# Patient Record
Sex: FEMALE | Race: OTHER | HISPANIC OR LATINO | Employment: OTHER | URBAN - METROPOLITAN AREA
[De-identification: names, ages, dates, MRNs, and addresses within clinical notes are randomized per-mention and may not be internally consistent; named-entity substitution may affect disease eponyms.]

---

## 2019-09-19 ENCOUNTER — HOSPITAL ENCOUNTER (OUTPATIENT)
Dept: OTHER | Facility: HOSPITAL | Age: 74
Discharge: HOME | End: 2019-09-19
Attending: INTERNAL MEDICINE

## 2019-09-20 LAB — HCV AB SER DONR QL: <0.1 S/CO RATIO (ref 0–0.9)

## 2019-09-30 ENCOUNTER — HOSPITAL ENCOUNTER (OUTPATIENT)
Dept: GENERAL RADIOLOGY | Facility: HOSPITAL | Age: 74
Discharge: HOME | End: 2019-09-30
Attending: NURSE PRACTITIONER

## 2019-11-04 ENCOUNTER — ANESTHESIA (OUTPATIENT)
Dept: PERIOP | Facility: HOSPITAL | Age: 74
End: 2019-11-04

## 2019-11-04 ENCOUNTER — ANESTHESIA EVENT (OUTPATIENT)
Dept: PERIOP | Facility: HOSPITAL | Age: 74
End: 2019-11-04

## 2019-11-04 ENCOUNTER — HOSPITAL ENCOUNTER (OUTPATIENT)
Facility: HOSPITAL | Age: 74
Setting detail: HOSPITAL OUTPATIENT SURGERY
Discharge: HOME | End: 2019-11-04
Attending: SURGERY | Admitting: SURGERY

## 2019-11-04 ENCOUNTER — APPOINTMENT (OUTPATIENT)
Dept: GENERAL RADIOLOGY | Facility: HOSPITAL | Age: 74
End: 2019-11-04

## 2019-11-04 VITALS
TEMPERATURE: 97.6 F | WEIGHT: 102 LBS | DIASTOLIC BLOOD PRESSURE: 73 MMHG | OXYGEN SATURATION: 98 % | BODY MASS INDEX: 17.42 KG/M2 | HEART RATE: 93 BPM | SYSTOLIC BLOOD PRESSURE: 179 MMHG | RESPIRATION RATE: 18 BRPM | HEIGHT: 64 IN

## 2019-11-04 LAB
ABO GROUP BLD: NORMAL
ANION GAP SERPL CALCULATED.3IONS-SCNC: 13.9 MMOL/L (ref 5–15)
BLD GP AB SCN SERPL QL: NEGATIVE
BUN BLD-MCNC: 13 MG/DL (ref 8–23)
BUN/CREAT SERPL: 17.3 (ref 7–25)
CALCIUM SPEC-SCNC: 10 MG/DL (ref 8.6–10.5)
CHLORIDE SERPL-SCNC: 99 MMOL/L (ref 98–107)
CO2 SERPL-SCNC: 28.1 MMOL/L (ref 22–29)
CREAT BLD-MCNC: 0.75 MG/DL (ref 0.57–1)
DEPRECATED RDW RBC AUTO: 44.5 FL (ref 37–54)
ERYTHROCYTE [DISTWIDTH] IN BLOOD BY AUTOMATED COUNT: 13.2 % (ref 12.3–15.4)
GFR SERPL CREATININE-BSD FRML MDRD: 76 ML/MIN/1.73
GLUCOSE BLD-MCNC: 130 MG/DL (ref 65–99)
GLUCOSE BLDC GLUCOMTR-MCNC: 96 MG/DL (ref 70–130)
HCT VFR BLD AUTO: 40.7 % (ref 34–46.6)
HGB BLD-MCNC: 13.2 G/DL (ref 12–15.9)
INR PPP: 1.01 (ref 0.9–1.1)
MCH RBC QN AUTO: 29.7 PG (ref 26.6–33)
MCHC RBC AUTO-ENTMCNC: 32.4 G/DL (ref 31.5–35.7)
MCV RBC AUTO: 91.5 FL (ref 79–97)
PLATELET # BLD AUTO: 314 10*3/MM3 (ref 140–450)
PMV BLD AUTO: 9.9 FL (ref 6–12)
POTASSIUM BLD-SCNC: 3.7 MMOL/L (ref 3.5–5.2)
PROTHROMBIN TIME: 13.1 SECONDS (ref 11.7–14.2)
RBC # BLD AUTO: 4.45 10*6/MM3 (ref 3.77–5.28)
RH BLD: NEGATIVE
SODIUM BLD-SCNC: 141 MMOL/L (ref 136–145)
T&S EXPIRATION DATE: NORMAL
WBC NRBC COR # BLD: 10.46 10*3/MM3 (ref 3.4–10.8)

## 2019-11-04 PROCEDURE — 25010000002 FENTANYL CITRATE (PF) 100 MCG/2ML SOLUTION: Performed by: NURSE ANESTHETIST, CERTIFIED REGISTERED

## 2019-11-04 PROCEDURE — C1725 CATH, TRANSLUMIN NON-LASER: HCPCS | Performed by: SURGERY

## 2019-11-04 PROCEDURE — C1769 GUIDE WIRE: HCPCS | Performed by: SURGERY

## 2019-11-04 PROCEDURE — 86901 BLOOD TYPING SEROLOGIC RH(D): CPT | Performed by: SURGERY

## 2019-11-04 PROCEDURE — 86850 RBC ANTIBODY SCREEN: CPT | Performed by: SURGERY

## 2019-11-04 PROCEDURE — C1874 STENT, COATED/COV W/DEL SYS: HCPCS | Performed by: SURGERY

## 2019-11-04 PROCEDURE — C1760 CLOSURE DEV, VASC: HCPCS | Performed by: SURGERY

## 2019-11-04 PROCEDURE — 25010000002 HEPARIN (PORCINE) PER 1000 UNITS: Performed by: NURSE ANESTHETIST, CERTIFIED REGISTERED

## 2019-11-04 PROCEDURE — 75625 CONTRAST EXAM ABDOMINL AORTA: CPT

## 2019-11-04 PROCEDURE — 93005 ELECTROCARDIOGRAM TRACING: CPT | Performed by: SURGERY

## 2019-11-04 PROCEDURE — 86900 BLOOD TYPING SEROLOGIC ABO: CPT | Performed by: SURGERY

## 2019-11-04 PROCEDURE — 75716 ARTERY X-RAYS ARMS/LEGS: CPT

## 2019-11-04 PROCEDURE — 25010000002 MIDAZOLAM PER 1 MG: Performed by: NURSE ANESTHETIST, CERTIFIED REGISTERED

## 2019-11-04 PROCEDURE — 80048 BASIC METABOLIC PNL TOTAL CA: CPT | Performed by: SURGERY

## 2019-11-04 PROCEDURE — C1887 CATHETER, GUIDING: HCPCS | Performed by: SURGERY

## 2019-11-04 PROCEDURE — 85610 PROTHROMBIN TIME: CPT | Performed by: SURGERY

## 2019-11-04 PROCEDURE — 0 IOPAMIDOL PER 1 ML: Performed by: SURGERY

## 2019-11-04 PROCEDURE — C1894 INTRO/SHEATH, NON-LASER: HCPCS | Performed by: SURGERY

## 2019-11-04 PROCEDURE — 25010000003 LIDOCAINE 1 % SOLUTION 20 ML VIAL: Performed by: SURGERY

## 2019-11-04 PROCEDURE — 25010000002 HEPARIN (PORCINE) PER 1000 UNITS: Performed by: SURGERY

## 2019-11-04 PROCEDURE — 25010000003 CEFAZOLIN IN DEXTROSE 2-4 GM/100ML-% SOLUTION: Performed by: SURGERY

## 2019-11-04 PROCEDURE — 85027 COMPLETE CBC AUTOMATED: CPT | Performed by: SURGERY

## 2019-11-04 PROCEDURE — 25010000002 PROPOFOL 10 MG/ML EMULSION: Performed by: NURSE ANESTHETIST, CERTIFIED REGISTERED

## 2019-11-04 PROCEDURE — 25010000002 MIDAZOLAM PER 1 MG: Performed by: ANESTHESIOLOGY

## 2019-11-04 PROCEDURE — 93010 ELECTROCARDIOGRAM REPORT: CPT | Performed by: INTERNAL MEDICINE

## 2019-11-04 PROCEDURE — 82962 GLUCOSE BLOOD TEST: CPT

## 2019-11-04 DEVICE — STNT PERIPH ZILVER PTX 6F 6X40MM 125CM BX/1: Type: IMPLANTABLE DEVICE | Site: ARTERY ILIAC | Status: FUNCTIONAL

## 2019-11-04 RX ORDER — MIDAZOLAM HYDROCHLORIDE 1 MG/ML
1 INJECTION INTRAMUSCULAR; INTRAVENOUS
Status: DISCONTINUED | OUTPATIENT
Start: 2019-11-04 | End: 2019-11-04 | Stop reason: HOSPADM

## 2019-11-04 RX ORDER — SODIUM CHLORIDE 0.9 % (FLUSH) 0.9 %
3-10 SYRINGE (ML) INJECTION AS NEEDED
Status: DISCONTINUED | OUTPATIENT
Start: 2019-11-04 | End: 2019-11-04 | Stop reason: HOSPADM

## 2019-11-04 RX ORDER — FAMOTIDINE 20 MG/1
20 TABLET, FILM COATED ORAL 2 TIMES DAILY
Status: ON HOLD | COMMUNITY
End: 2020-11-24

## 2019-11-04 RX ORDER — PROMETHAZINE HYDROCHLORIDE 25 MG/ML
12.5 INJECTION, SOLUTION INTRAMUSCULAR; INTRAVENOUS ONCE AS NEEDED
Status: DISCONTINUED | OUTPATIENT
Start: 2019-11-04 | End: 2019-11-04 | Stop reason: HOSPADM

## 2019-11-04 RX ORDER — SODIUM CHLORIDE, SODIUM LACTATE, POTASSIUM CHLORIDE, CALCIUM CHLORIDE 600; 310; 30; 20 MG/100ML; MG/100ML; MG/100ML; MG/100ML
9 INJECTION, SOLUTION INTRAVENOUS CONTINUOUS
Status: DISCONTINUED | OUTPATIENT
Start: 2019-11-04 | End: 2019-11-04 | Stop reason: HOSPADM

## 2019-11-04 RX ORDER — ACETAMINOPHEN 325 MG/1
650 TABLET ORAL ONCE AS NEEDED
Status: DISCONTINUED | OUTPATIENT
Start: 2019-11-04 | End: 2019-11-04 | Stop reason: HOSPADM

## 2019-11-04 RX ORDER — LIDOCAINE HYDROCHLORIDE 10 MG/ML
0.5 INJECTION, SOLUTION EPIDURAL; INFILTRATION; INTRACAUDAL; PERINEURAL ONCE AS NEEDED
Status: DISCONTINUED | OUTPATIENT
Start: 2019-11-04 | End: 2019-11-04 | Stop reason: HOSPADM

## 2019-11-04 RX ORDER — ONDANSETRON 2 MG/ML
4 INJECTION INTRAMUSCULAR; INTRAVENOUS ONCE AS NEEDED
Status: DISCONTINUED | OUTPATIENT
Start: 2019-11-04 | End: 2019-11-04 | Stop reason: HOSPADM

## 2019-11-04 RX ORDER — SODIUM CHLORIDE 0.9 % (FLUSH) 0.9 %
3 SYRINGE (ML) INJECTION EVERY 12 HOURS SCHEDULED
Status: DISCONTINUED | OUTPATIENT
Start: 2019-11-04 | End: 2019-11-04 | Stop reason: HOSPADM

## 2019-11-04 RX ORDER — PROPOFOL 10 MG/ML
VIAL (ML) INTRAVENOUS CONTINUOUS PRN
Status: DISCONTINUED | OUTPATIENT
Start: 2019-11-04 | End: 2019-11-04 | Stop reason: SURG

## 2019-11-04 RX ORDER — CLOPIDOGREL BISULFATE 75 MG/1
150 TABLET ORAL DAILY
Status: DISCONTINUED | OUTPATIENT
Start: 2019-11-04 | End: 2019-11-04 | Stop reason: HOSPADM

## 2019-11-04 RX ORDER — LEVOTHYROXINE SODIUM 0.05 MG/1
50 TABLET ORAL DAILY
COMMUNITY
End: 2020-12-02 | Stop reason: HOSPADM

## 2019-11-04 RX ORDER — FAMOTIDINE 10 MG/ML
20 INJECTION, SOLUTION INTRAVENOUS ONCE
Status: COMPLETED | OUTPATIENT
Start: 2019-11-04 | End: 2019-11-04

## 2019-11-04 RX ORDER — DIPHENHYDRAMINE HYDROCHLORIDE 50 MG/ML
12.5 INJECTION INTRAMUSCULAR; INTRAVENOUS
Status: DISCONTINUED | OUTPATIENT
Start: 2019-11-04 | End: 2019-11-04 | Stop reason: HOSPADM

## 2019-11-04 RX ORDER — BUSPIRONE HYDROCHLORIDE 10 MG/1
10 TABLET ORAL DAILY
Status: ON HOLD | COMMUNITY
End: 2020-11-24

## 2019-11-04 RX ORDER — FENTANYL CITRATE 50 UG/ML
INJECTION, SOLUTION INTRAMUSCULAR; INTRAVENOUS AS NEEDED
Status: DISCONTINUED | OUTPATIENT
Start: 2019-11-04 | End: 2019-11-04 | Stop reason: SURG

## 2019-11-04 RX ORDER — PROMETHAZINE HYDROCHLORIDE 25 MG/1
25 TABLET ORAL ONCE AS NEEDED
Status: DISCONTINUED | OUTPATIENT
Start: 2019-11-04 | End: 2019-11-04 | Stop reason: HOSPADM

## 2019-11-04 RX ORDER — FENTANYL CITRATE 50 UG/ML
50 INJECTION, SOLUTION INTRAMUSCULAR; INTRAVENOUS
Status: DISCONTINUED | OUTPATIENT
Start: 2019-11-04 | End: 2019-11-04 | Stop reason: HOSPADM

## 2019-11-04 RX ORDER — PENTOXIFYLLINE 400 MG/1
400 TABLET, EXTENDED RELEASE ORAL
COMMUNITY
End: 2019-11-04 | Stop reason: HOSPADM

## 2019-11-04 RX ORDER — MIDAZOLAM HYDROCHLORIDE 1 MG/ML
INJECTION INTRAMUSCULAR; INTRAVENOUS AS NEEDED
Status: DISCONTINUED | OUTPATIENT
Start: 2019-11-04 | End: 2019-11-04 | Stop reason: SURG

## 2019-11-04 RX ORDER — EPHEDRINE SULFATE 50 MG/ML
5 INJECTION, SOLUTION INTRAVENOUS ONCE AS NEEDED
Status: DISCONTINUED | OUTPATIENT
Start: 2019-11-04 | End: 2019-11-04 | Stop reason: HOSPADM

## 2019-11-04 RX ORDER — SODIUM CHLORIDE, SODIUM LACTATE, POTASSIUM CHLORIDE, CALCIUM CHLORIDE 600; 310; 30; 20 MG/100ML; MG/100ML; MG/100ML; MG/100ML
100 INJECTION, SOLUTION INTRAVENOUS CONTINUOUS
Status: DISCONTINUED | OUTPATIENT
Start: 2019-11-04 | End: 2019-11-04 | Stop reason: HOSPADM

## 2019-11-04 RX ORDER — ATORVASTATIN CALCIUM 10 MG/1
10 TABLET, FILM COATED ORAL DAILY
Status: ON HOLD | COMMUNITY
End: 2020-11-24

## 2019-11-04 RX ORDER — CEFAZOLIN SODIUM 2 G/100ML
2 INJECTION, SOLUTION INTRAVENOUS ONCE
Status: COMPLETED | OUTPATIENT
Start: 2019-11-04 | End: 2019-11-04

## 2019-11-04 RX ORDER — ALBUTEROL SULFATE 2.5 MG/3ML
2.5 SOLUTION RESPIRATORY (INHALATION) ONCE AS NEEDED
Status: DISCONTINUED | OUTPATIENT
Start: 2019-11-04 | End: 2019-11-04 | Stop reason: HOSPADM

## 2019-11-04 RX ORDER — CILOSTAZOL 50 MG/1
50 TABLET ORAL
Status: COMPLETED | OUTPATIENT
Start: 2019-11-04 | End: 2019-11-04

## 2019-11-04 RX ORDER — DIPHENHYDRAMINE HCL 25 MG
25 CAPSULE ORAL
Status: DISCONTINUED | OUTPATIENT
Start: 2019-11-04 | End: 2019-11-04 | Stop reason: HOSPADM

## 2019-11-04 RX ORDER — HYDRALAZINE HYDROCHLORIDE 20 MG/ML
5 INJECTION INTRAMUSCULAR; INTRAVENOUS
Status: DISCONTINUED | OUTPATIENT
Start: 2019-11-04 | End: 2019-11-04 | Stop reason: HOSPADM

## 2019-11-04 RX ORDER — LOSARTAN POTASSIUM AND HYDROCHLOROTHIAZIDE 12.5; 5 MG/1; MG/1
1 TABLET ORAL DAILY
Status: ON HOLD | COMMUNITY
End: 2020-11-24

## 2019-11-04 RX ORDER — HYDROCODONE BITARTRATE AND ACETAMINOPHEN 7.5; 325 MG/1; MG/1
1 TABLET ORAL ONCE AS NEEDED
Status: DISCONTINUED | OUTPATIENT
Start: 2019-11-04 | End: 2019-11-04 | Stop reason: HOSPADM

## 2019-11-04 RX ORDER — FLUMAZENIL 0.1 MG/ML
0.2 INJECTION INTRAVENOUS AS NEEDED
Status: DISCONTINUED | OUTPATIENT
Start: 2019-11-04 | End: 2019-11-04 | Stop reason: HOSPADM

## 2019-11-04 RX ORDER — HEPARIN SODIUM 1000 [USP'U]/ML
INJECTION, SOLUTION INTRAVENOUS; SUBCUTANEOUS AS NEEDED
Status: DISCONTINUED | OUTPATIENT
Start: 2019-11-04 | End: 2019-11-04 | Stop reason: SURG

## 2019-11-04 RX ORDER — CLOPIDOGREL BISULFATE 75 MG/1
75 TABLET ORAL DAILY
COMMUNITY
End: 2020-12-02 | Stop reason: HOSPADM

## 2019-11-04 RX ORDER — CLOPIDOGREL BISULFATE 75 MG/1
75 TABLET ORAL DAILY
Status: DISCONTINUED | OUTPATIENT
Start: 2019-11-04 | End: 2019-11-04

## 2019-11-04 RX ORDER — PROMETHAZINE HYDROCHLORIDE 25 MG/1
25 SUPPOSITORY RECTAL ONCE AS NEEDED
Status: DISCONTINUED | OUTPATIENT
Start: 2019-11-04 | End: 2019-11-04 | Stop reason: HOSPADM

## 2019-11-04 RX ORDER — OXYCODONE AND ACETAMINOPHEN 7.5; 325 MG/1; MG/1
1 TABLET ORAL ONCE AS NEEDED
Status: DISCONTINUED | OUTPATIENT
Start: 2019-11-04 | End: 2019-11-04 | Stop reason: HOSPADM

## 2019-11-04 RX ORDER — HYDROMORPHONE HYDROCHLORIDE 1 MG/ML
0.2 INJECTION, SOLUTION INTRAMUSCULAR; INTRAVENOUS; SUBCUTANEOUS
Status: DISCONTINUED | OUTPATIENT
Start: 2019-11-04 | End: 2019-11-04 | Stop reason: HOSPADM

## 2019-11-04 RX ORDER — MIDAZOLAM HYDROCHLORIDE 1 MG/ML
2 INJECTION INTRAMUSCULAR; INTRAVENOUS
Status: DISCONTINUED | OUTPATIENT
Start: 2019-11-04 | End: 2019-11-04 | Stop reason: HOSPADM

## 2019-11-04 RX ORDER — PROMETHAZINE HYDROCHLORIDE 25 MG/ML
6.25 INJECTION, SOLUTION INTRAMUSCULAR; INTRAVENOUS
Status: DISCONTINUED | OUTPATIENT
Start: 2019-11-04 | End: 2019-11-04 | Stop reason: HOSPADM

## 2019-11-04 RX ORDER — NALOXONE HCL 0.4 MG/ML
0.2 VIAL (ML) INJECTION AS NEEDED
Status: DISCONTINUED | OUTPATIENT
Start: 2019-11-04 | End: 2019-11-04 | Stop reason: HOSPADM

## 2019-11-04 RX ADMIN — FENTANYL CITRATE 25 MCG: 50 INJECTION, SOLUTION INTRAMUSCULAR; INTRAVENOUS at 17:27

## 2019-11-04 RX ADMIN — FAMOTIDINE 20 MG: 10 INJECTION INTRAVENOUS at 12:33

## 2019-11-04 RX ADMIN — PROPOFOL 75 MCG/KG/MIN: 10 INJECTION, EMULSION INTRAVENOUS at 14:45

## 2019-11-04 RX ADMIN — CILOSTAZOL 50 MG: 50 TABLET ORAL at 17:10

## 2019-11-04 RX ADMIN — FENTANYL CITRATE 25 MCG: 50 INJECTION INTRAMUSCULAR; INTRAVENOUS at 14:45

## 2019-11-04 RX ADMIN — FENTANYL CITRATE 25 MCG: 50 INJECTION INTRAMUSCULAR; INTRAVENOUS at 15:05

## 2019-11-04 RX ADMIN — CLOPIDOGREL 150 MG: 75 TABLET, FILM COATED ORAL at 17:11

## 2019-11-04 RX ADMIN — CEFAZOLIN SODIUM 2 G: 2 INJECTION, SOLUTION INTRAVENOUS at 14:36

## 2019-11-04 RX ADMIN — Medication 0.5 MG: at 14:45

## 2019-11-04 RX ADMIN — HEPARIN SODIUM 5000 UNITS: 1000 INJECTION, SOLUTION INTRAVENOUS; SUBCUTANEOUS at 15:05

## 2019-11-04 RX ADMIN — IOPAMIDOL 60 ML: 510 INJECTION, SOLUTION INTRAVASCULAR at 14:29

## 2019-11-04 RX ADMIN — SODIUM CHLORIDE, POTASSIUM CHLORIDE, SODIUM LACTATE AND CALCIUM CHLORIDE 9 ML/HR: 600; 310; 30; 20 INJECTION, SOLUTION INTRAVENOUS at 12:32

## 2019-11-04 RX ADMIN — Medication 0.5 MG: at 15:05

## 2019-11-04 RX ADMIN — MIDAZOLAM 1 MG: 1 INJECTION INTRAMUSCULAR; INTRAVENOUS at 12:33

## 2019-11-04 RX ADMIN — FENTANYL CITRATE 50 MCG: 50 INJECTION, SOLUTION INTRAMUSCULAR; INTRAVENOUS at 18:17

## 2019-11-04 RX ADMIN — FENTANYL CITRATE 25 MCG: 50 INJECTION, SOLUTION INTRAMUSCULAR; INTRAVENOUS at 16:19

## 2019-11-04 NOTE — ANESTHESIA PREPROCEDURE EVALUATION
Anesthesia Evaluation     Patient summary reviewed and Nursing notes reviewed                Airway   Mallampati: II  Dental      Pulmonary - negative pulmonary ROS   Cardiovascular     ECG reviewed  Rhythm: regular  Rate: normal    (+) hypertension, PVD, hyperlipidemia,       Neuro/Psych- negative ROS  GI/Hepatic/Renal/Endo    (+)  GERD,  diabetes mellitus,     Musculoskeletal (-) negative ROS    Abdominal    Substance History - negative use     OB/GYN negative ob/gyn ROS         Other                        Anesthesia Plan    ASA 3     MAC     intravenous induction   Anesthetic plan, all risks, benefits, and alternatives have been provided, discussed and informed consent has been obtained with: patient.

## 2019-11-04 NOTE — OP NOTE
Operative Note  Date of Admission:  11/4/2019  OR Date: 11/4/2019    Pre-op Diagnosis:   Peripheral artery occlusive disease with rest pain and severe claudication left lower extremity    Post-op Diagnosis:     Same secondary to high-grade left common iliac artery stenosis and total occlusion of left distal superficial femoral artery and proximal popliteal artery    Procedure:   1) duplex ultrasound-guided percutaneous access of the right common femoral artery with abdominal aortogram; selective left lower extremity angiogram; angioplasty of left superficial femoral and popliteal arteries with 3 mm x 120 mm angioplasty balloon; angioplasty and stent of left common iliac artery with 6 mm x 40 mm PTX stent dilated to 7 mm size    Surgeon: Tanisha Romero Jr, MD    Assistant: JASON Hanley CSA    Anesthesia: Monitored Anesthesia Care    Staff:   Circulator: Stephanie Aguilar RN  Scrub Person: Omayra Stark  Assistant: Norma Baird CSA  Vascular Radiology Technician: Joss Troy    Estimated Blood Loss: Minimal    Specimens: None    Order Name Source Comment Collection Info Order Time   CBC (NO DIFF)   Collected By: Griselda Cruz RN 11/4/2019 11:25 AM   BASIC METABOLIC PANEL   Collected By: Griselda Cruz RN 11/4/2019 11:25 AM   PROTIME-INR   Collected By: Griselda Cruz RN 11/4/2019 11:28 AM   TYPE AND SCREEN   Collected By: Griselda Cruz RN 11/4/2019 12:06 PM       Complications: None    Findings: 80% stenosis of the proximal left common iliac artery and total occlusion of the distal superficial femoral and proximal popliteal artery.  There was primarily one-vessel runoff via the anterior tibial artery into the foot.  The peroneal and posterior tibial arteries occluded at mid calf but reconstituted distally.    Indications:  As in preop diagnosis           Procedure: The patient was placed on the operating table in the supine position.  After satisfactory level of intravenous sedation was  achieved the patient was prepped from the umbilicus to the knees using ChloraPrep and draped in the usual sterile fashion.  Using duplex ultrasound the right common iliac artery was identified and local anesthetic infiltrated into the skin and subcutaneous tissue.  The artery was accessed percutaneously under direct vision.  Guidewire was advanced in the 5 Bermudian sheath placed over the guidewire.  A pigtail catheter was placed over the guidewire.  An abdominal aortogram was performed using 15 cc of contrast at 10 cc/s.  This demonstrated normal renal arteries bilaterally and a normal infrarenal abdominal aorta.  Paired lumbar arteries were identified.  The right common iliac hypogastric and external iliac arteries were normal.  There was an 80% stenosis in the proximal left common iliac artery.  The distal common iliac artery, hypogastric, and external iliac arteries were normal.  5000 units of heparin was then given intravenously.  A rim catheter was placed over the guidewire and the guidewire was advanced down into the superficial femoral artery.  A 5 Bermudian by 55 cm long sheath was placed over the guidewire and positioned into the left common femoral artery.  A left lower femur angiogram was performed using 7 cc of contrast at 5 cc/s.  This demonstrated multiple segmental stenoses in the superficial femoral artery of approximately 50 to 70%.  There was total occlusion of the distal superficial femoral artery and proximal popliteal artery.  There is reconstitution of popliteal artery above the knee.  There was one vessel runoff primarily via the anterior tibial artery into the foot.  The peroneal and posterior tibial arteries were patent to the mid calf and then occluded.  A crossing catheter was then placed over the guidewire and I was able to traverse the total occlusion of the superficial femoral and popliteal arteries.  A hand-held injection through the crossing catheter confirmed this was intraluminal.  This  guidewire was exchanged for 0.014 guidewire.  A 3 mm x 120 mm angioplasty balloon was then placed over the guidewire and positioned in the popliteal artery distally.  This was inflated to 12 ross for 3 minutes.  The balloon was deflated and pulled proximally and the remaining superficial femoral artery was dilated up to 12 ross for 3 minutes.  An injection through the sheath demonstrated complete resolution of all stenoses brisk flow into the popliteal artery.  The below-knee segments were then utilized with segmental injections of 7 cc of contrast at 5 cc/s.  The above findings were noted.  This guidewire was exchanged for 0.035 guidewire.  The 5 Sammarinese sheath was then exchanged for a 6 x 45 cm sheath.  Sheath was then pulled back to the aortic bifurcation.  An injection through the sheath of 10 cc of contrast demonstrated the high-grade left common iliac artery stenosis.  A 6 mm x 40 mm Zilver PTX stent was placed over the guidewire and landed at the area of stenosis.  It was postdilated with a 7 mm x 40 mm balloon.  Injection through the sheath demonstrated complete resolution of the stenosis with a normal-appearing artery.  The sheath was removed and a Star close device was then placed over the guidewire and the guidewire removed.  The Star close device was deployed.  There is immediate hemostasis.  Additional manual pressure was held for 10 minutes.  Hemostasis was checked and noted be satisfactory.  Sterile dressings were applied.  The patient had a markedly improved Doppler signal in the dorsalis pedis artery and a strong posterior tibial signal as well.  The foot was hyperemic and warm.  Patient was then transported to recovery room in satisfactory condition.        Radiographic Findings:  1) as described above      There are no hospital problems to display for this patient.     Tanisha Romero Jr., MD     Date: 11/4/2019  Time: 3:55 PM

## 2019-11-04 NOTE — DISCHARGE INSTRUCTIONS
[facility]  Nick Swanson, Kady Dinh Rachel, [place], [place]  [address]  [place] [address]  [phone]      Discharge Instructions for Angiogram    1. Go home, rest and take it easy today.      2. You may experience some dizziness or memory loss from the anesthesia.  This may last for the next 24 hours.  Someone should plan on staying with you for the first 24 hours for your safety.    3. Do not make any important legal decisions or sign any legal papers for the next 24 hours.      4. Eat and drink lightly today.  Start off with liquids, jello, soup, crackers or other bland foods at first. You may advance your diet tomorrow as tolerated as long as you do not experience any nausea or vomiting.    5. You may resume routine medications including blood thinners. However, Glucophage should be not be started for 72 hours after the dye is given.      6. No lifting over 10 pounds and no strenuous activity for the next 2-3 days.    7. Try not to strain or bear down when your bowels move or when you empty your bladder.    8. Limit going up and down steps for 2 days.    9. No driving for the remainder of the day.  You may resume limited driving tomorrow if necessary.     10.  You may shower tomorrow.  No bath or hot tubs for at least 2 days after the procedure.          11. Leave the pressure dressing on until tomorrow morning.  You may then take this off, as well as the small see through dressing with gauze tomorrow.  If it doesn’t come off easily, do not pull this off.  If it is stuck, shower and let the warm water loosen it before removal.       12. Check your groin dressing regularly for bleeding through the dressing (under the pressure dressing).  A small amount of blood contained by the gauze is normal; the whole dressing should not be filled with blood or leaking out under the sides.     13. If you experience bleeding through the gauze/clear sticky dressing, lay flat  and have someone apply direct pressure for 15 minutes.  If bleeding has stopped after this, you may put a clean gauze and tape over the area.  Continue to lie flat for 1-2 hours.  If bleeding continues after 15 minutes of pressure, call us at the number listed above.  There is an MD available after hours.      14. If you experience heavy bleeding or large swelling, continue to hold firm pressure and              call 911.  Do not call the MD on call.     15.  If you experience pain or discomfort you may take Tylenol or Ibuprofen, whichever you normally use for minor discomfort, unless otherwise instructed.       16.  If the MD gives you a prescription for narcotic pain pills (Tylenol #3, Vicodin, Hydrocodone, Oxycodone or Percocet), you cannot drive a vehicle or operate machinery while taking these.    17.  Severe pain is not expected after this procedure.  If you experience severe pain, please call our office at [phone].    18.  Remember to contact our office for any of the following:    • Fever > 101 degrees  • Severe pain that cannot be controlled by taking your pain pills  • Severe nausea or vomiting   • Significant bleeding of your incisions  • Drainage that has a bad smell or is yellow or green in appearance  • Any other questions or concerns

## 2019-11-04 NOTE — ANESTHESIA POSTPROCEDURE EVALUATION
"Patient: Gwendolyn AUGUSTIN    Procedure Summary     Date:  11/04/19 [facility] / [facility]    Anesthesia Start:  1436 Anesthesia Stop:  1604    Procedure:  AORTIC ILIAC FEMORAL LEFT LOWER EXTREMITY RUNOFF LEFT SUPERFICIAL FEMORAL ARTERY/POP PERCONTANEOUS TRANSLUMINAL ANGIOPLASTY (Left Thigh) Diagnosis:      Surgeon:  Tanisha Romero Jr., MD Provider:  Neo Harris MD    Anesthesia Type:  MAC ASA Status:  3          Anesthesia Type: MAC  Last vitals  BP   167/71 (11/04/19 1645)   Temp   36.4 °C (97.6 °F) (11/04/19 1559)   Pulse   88 (11/04/19 1645)   Resp   18 (11/04/19 1645)     SpO2   97 % (11/04/19 1645)     Post Anesthesia Care and Evaluation    Patient location during evaluation: bedside  Patient participation: complete - patient participated  Level of consciousness: awake and alert  Pain management: adequate  Airway patency: patent  Anesthetic complications: No anesthetic complications    Cardiovascular status: acceptable  Respiratory status: acceptable  Hydration status: acceptable    Comments: /71   Pulse 88   Temp 36.4 °C (97.6 °F) (Oral)   Resp 18   Ht 162.6 cm (64\")   Wt 46.3 kg (102 lb)   SpO2 97%   BMI 17.51 kg/m²       "

## 2019-11-04 NOTE — H&P
History and physical examination update    Complete history and physical examination performed in the office last week.    There has been no change in the history or physical examination from that report, which is included in the chart.

## 2019-11-08 ENCOUNTER — HOSPITAL ENCOUNTER (OUTPATIENT)
Dept: NUCLEAR MEDICINE | Facility: HOSPITAL | Age: 74
Discharge: HOME | End: 2019-11-08
Attending: NURSE PRACTITIONER

## 2019-12-02 ENCOUNTER — OFFICE VISIT CONVERTED (OUTPATIENT)
Dept: GASTROENTEROLOGY | Facility: CLINIC | Age: 74
End: 2019-12-02
Attending: INTERNAL MEDICINE

## 2019-12-12 ENCOUNTER — HOSPITAL ENCOUNTER (OUTPATIENT)
Dept: GASTROENTEROLOGY | Facility: HOSPITAL | Age: 74
Setting detail: HOSPITAL OUTPATIENT SURGERY
Discharge: HOME | End: 2019-12-12
Attending: INTERNAL MEDICINE

## 2020-01-23 ENCOUNTER — HOSPITAL ENCOUNTER (OUTPATIENT)
Dept: PHYSICAL THERAPY | Facility: CLINIC | Age: 75
Setting detail: RECURRING SERIES
Discharge: HOME | End: 2020-07-09
Attending: NURSE PRACTITIONER

## 2020-02-06 ENCOUNTER — HOSPITAL ENCOUNTER (OUTPATIENT)
Dept: MAMMOGRAPHY | Facility: HOSPITAL | Age: 75
Discharge: HOME | End: 2020-02-06
Attending: NURSE PRACTITIONER

## 2020-02-17 ENCOUNTER — APPOINTMENT (OUTPATIENT)
Dept: PREADMISSION TESTING | Facility: HOSPITAL | Age: 75
End: 2020-02-17

## 2020-02-17 VITALS
TEMPERATURE: 97.6 F | HEIGHT: 60 IN | BODY MASS INDEX: 19.83 KG/M2 | WEIGHT: 101 LBS | HEART RATE: 97 BPM | RESPIRATION RATE: 16 BRPM | SYSTOLIC BLOOD PRESSURE: 143 MMHG | DIASTOLIC BLOOD PRESSURE: 71 MMHG | OXYGEN SATURATION: 100 %

## 2020-02-17 LAB
ANION GAP SERPL CALCULATED.3IONS-SCNC: 13.4 MMOL/L (ref 5–15)
BUN BLD-MCNC: 16 MG/DL (ref 8–23)
BUN/CREAT SERPL: 20 (ref 7–25)
CALCIUM SPEC-SCNC: 10.2 MG/DL (ref 8.6–10.5)
CHLORIDE SERPL-SCNC: 103 MMOL/L (ref 98–107)
CO2 SERPL-SCNC: 25.6 MMOL/L (ref 22–29)
CREAT BLD-MCNC: 0.8 MG/DL (ref 0.57–1)
DEPRECATED RDW RBC AUTO: 41.2 FL (ref 37–54)
ERYTHROCYTE [DISTWIDTH] IN BLOOD BY AUTOMATED COUNT: 12.8 % (ref 12.3–15.4)
GFR SERPL CREATININE-BSD FRML MDRD: 71 ML/MIN/1.73
GLUCOSE BLD-MCNC: 133 MG/DL (ref 65–99)
HCT VFR BLD AUTO: 36.6 % (ref 34–46.6)
HGB BLD-MCNC: 12.5 G/DL (ref 12–15.9)
MCH RBC QN AUTO: 30.3 PG (ref 26.6–33)
MCHC RBC AUTO-ENTMCNC: 34.2 G/DL (ref 31.5–35.7)
MCV RBC AUTO: 88.8 FL (ref 79–97)
PLATELET # BLD AUTO: 268 10*3/MM3 (ref 140–450)
PMV BLD AUTO: 9.9 FL (ref 6–12)
POTASSIUM BLD-SCNC: 4.5 MMOL/L (ref 3.5–5.2)
RBC # BLD AUTO: 4.12 10*6/MM3 (ref 3.77–5.28)
SODIUM BLD-SCNC: 142 MMOL/L (ref 136–145)
WBC NRBC COR # BLD: 9.42 10*3/MM3 (ref 3.4–10.8)

## 2020-02-17 PROCEDURE — 80048 BASIC METABOLIC PNL TOTAL CA: CPT | Performed by: SURGERY

## 2020-02-17 PROCEDURE — 85027 COMPLETE CBC AUTOMATED: CPT | Performed by: SURGERY

## 2020-02-17 PROCEDURE — 36415 COLL VENOUS BLD VENIPUNCTURE: CPT

## 2020-02-17 RX ORDER — ALENDRONATE SODIUM 70 MG/1
70 TABLET ORAL
Status: ON HOLD | COMMUNITY
End: 2020-11-24

## 2020-02-17 RX ORDER — GABAPENTIN 400 MG/1
400 CAPSULE ORAL 3 TIMES DAILY
Status: ON HOLD | COMMUNITY
End: 2020-12-02 | Stop reason: SDUPTHER

## 2020-02-17 RX ORDER — TRAZODONE HYDROCHLORIDE 50 MG/1
50 TABLET ORAL NIGHTLY
Status: ON HOLD | COMMUNITY
End: 2020-11-24

## 2020-02-17 NOTE — DISCHARGE INSTRUCTIONS
Take the following medications the morning of surgery:LOSARTAN/HCTZ        General Instructions:  • Do not eat solid food after midnight the night before surgery.  • You may drink clear liquids day of surgery but must stop at least one hour before your hospital arrival time.  • It is beneficial for you to have a clear drink that contains carbohydrates the day of surgery.  We suggest a 12 to 20 ounce bottle of Gatorade or Powerade for non-diabetic patients or a 12 to 20 ounce bottle of G2 or Powerade Zero for diabetic patients.     Clear liquids are liquids you can see through.  Nothing red in color.     Plain water                               Sports drinks  Sodas                                   Gelatin (Jell-O)  Fruit juices without pulp such as white grape juice and apple juice  Popsicles that contain no fruit or yogurt  Tea or coffee (no cream or milk added)  Gatorade / Powerade  G2 / Powerade Zero    • Bring any papers given to you in the doctor’s office.  • Wear clean comfortable clothes.  • Do not wear contact lenses, false eyelashes or make-up.  Bring a case for your glasses.   • Bring crutches or walker if applicable.  • Remove all piercings.  Leave jewelry and any other valuables at home.  • The Pre-Admission Testing nurse will instruct you to bring medications if unable to obtain an accurate list in Pre-Admission Testing.            Preventing a Surgical Site Infection:  • For 2 to 3 days before surgery, avoid shaving with a razor because the razor can irritate skin and make it easier to develop an infection.    • Any areas of open skin can increase the risk of a post-operative wound infection by allowing bacteria to enter and travel throughout the body.  Notify your surgeon if you have any skin wounds / rashes even if it is not near the expected surgical site.  The area will need assessed to determine if surgery should be delayed until it is healed.  • The night prior to surgery sleep in a clean bed  with clean clothing.  Do not allow pets to sleep with you.  • Shower on the morning of surgery using a fresh bar of anti-bacterial soap (such as Dial) and clean washcloth.  Dry with a clean towel and dress in clean clothing.  • Ask your surgeon if you will be receiving antibiotics prior to surgery.  • Make sure you, your family, and all healthcare providers clean their hands with soap and water or an alcohol based hand  before caring for you or your wound.    Day of surgery: 2/24/2020. MAIN OR. ARRIVAL TIME 530 AM  Your arrival time is approximately two hours before your scheduled surgery time.  Upon arrival, a Pre-op nurse and Anesthesiologist will review your health history, obtain vital signs, and answer questions you may have.  The only belongings needed at this time will be a list of your home medications and if applicable your C-PAP/BI-PAP machine.  If you are staying overnight your family can leave the rest of your belongings in the car and bring them to your room later.  A Pre-op nurse will start an IV and you may receive medication in preparation for surgery, including something to help you relax.  Your family will be able to see you in the Pre-op area.  Two visitors at a time will be allowed in the Pre-op room.  While you are in surgery your family should notify the waiting room  if they leave the waiting room area and provide a contact phone number.    Please be aware that surgery does come with discomfort.  We want to make every effort to control your discomfort so please discuss any uncontrolled symptoms with your nurse.   Your doctor will most likely have prescribed pain medications.      If you are going home after surgery you will receive individualized written care instructions before being discharged.  A responsible adult must drive you to and from the hospital on the day of your surgery and stay with you for 24 hours.        If you have any questions please call Pre-Admission  Testing at [phone].  Deductibles and co-payments are collected on the day of service. Please be prepared to pay the required co-pay, deductible or deposit on the day of service as defined by your plan.    2% CHLORHEXIDINE GLUCONATE* CLOTH  Preparing or “prepping” skin before surgery can reduce the risk of infection at the surgical site. To make the process easier, [facility] has chosen disposable cloths moistened with a rinse-free, 2% Chlorhexidine Gluconate (CHG) antiseptic solution. The steps below outline the prepping process and should be carefully followed.        Use the prep cloth on the area that is circled in the diagram             Directions Night before Surgery  1) Shower using a fresh bar of anti-bacterial soap (such as Dial) and clean washcloth.  Use a clean towel to completely dry your skin.  2) Do not use any lotions, oils or creams on your skin.  3) Open the package and remove 1 cloth, wipe your skin for 30 seconds in a circular motion.  Allow to dry for 3 minutes.  4) Repeat #3 with second cloth.  5) Do not touch your eyes, ears, or mouth with the prep cloth.  6) Allow the wet prep solution to air dry.  7) Discard the prep cloth and wash your hands with soap and water.   8) Dress in clean bed clothes and sleep on fresh clean bed sheets.   9) You may experience some temporary itching after the prep.    Directions Day of Surgery  1) Repeat steps 1,2,3,4,5,6,7, and 9.   2) Dress in clean clothes before coming to the hospital.

## 2020-02-24 ENCOUNTER — APPOINTMENT (OUTPATIENT)
Dept: GENERAL RADIOLOGY | Facility: HOSPITAL | Age: 75
End: 2020-02-24

## 2020-02-24 ENCOUNTER — ANESTHESIA EVENT (OUTPATIENT)
Dept: PERIOP | Facility: HOSPITAL | Age: 75
End: 2020-02-24

## 2020-02-24 ENCOUNTER — HOSPITAL ENCOUNTER (OUTPATIENT)
Facility: HOSPITAL | Age: 75
Setting detail: HOSPITAL OUTPATIENT SURGERY
Discharge: HOME | End: 2020-02-24
Attending: SURGERY | Admitting: SURGERY

## 2020-02-24 ENCOUNTER — ANESTHESIA (OUTPATIENT)
Dept: PERIOP | Facility: HOSPITAL | Age: 75
End: 2020-02-24

## 2020-02-24 VITALS
RESPIRATION RATE: 16 BRPM | BODY MASS INDEX: 17.52 KG/M2 | HEART RATE: 86 BPM | WEIGHT: 95.19 LBS | DIASTOLIC BLOOD PRESSURE: 72 MMHG | TEMPERATURE: 97.5 F | HEIGHT: 62 IN | SYSTOLIC BLOOD PRESSURE: 158 MMHG | OXYGEN SATURATION: 98 %

## 2020-02-24 LAB
GLUCOSE BLDC GLUCOMTR-MCNC: 71 MG/DL (ref 70–130)
GLUCOSE BLDC GLUCOMTR-MCNC: 99 MG/DL (ref 70–130)

## 2020-02-24 PROCEDURE — C1769 GUIDE WIRE: HCPCS | Performed by: SURGERY

## 2020-02-24 PROCEDURE — C1887 CATHETER, GUIDING: HCPCS | Performed by: SURGERY

## 2020-02-24 PROCEDURE — C1894 INTRO/SHEATH, NON-LASER: HCPCS | Performed by: SURGERY

## 2020-02-24 PROCEDURE — 25010000002 FENTANYL CITRATE (PF) 100 MCG/2ML SOLUTION: Performed by: NURSE ANESTHETIST, CERTIFIED REGISTERED

## 2020-02-24 PROCEDURE — C1725 CATH, TRANSLUMIN NON-LASER: HCPCS | Performed by: SURGERY

## 2020-02-24 PROCEDURE — 82962 GLUCOSE BLOOD TEST: CPT

## 2020-02-24 PROCEDURE — C1874 STENT, COATED/COV W/DEL SYS: HCPCS | Performed by: SURGERY

## 2020-02-24 PROCEDURE — 25010000002 PROPOFOL 10 MG/ML EMULSION: Performed by: NURSE ANESTHETIST, CERTIFIED REGISTERED

## 2020-02-24 PROCEDURE — 75625 CONTRAST EXAM ABDOMINL AORTA: CPT

## 2020-02-24 PROCEDURE — C1760 CLOSURE DEV, VASC: HCPCS | Performed by: SURGERY

## 2020-02-24 PROCEDURE — 75716 ARTERY X-RAYS ARMS/LEGS: CPT

## 2020-02-24 PROCEDURE — 25010000002 HEPARIN (PORCINE) PER 1000 UNITS: Performed by: SURGERY

## 2020-02-24 PROCEDURE — 25010000002 HEPARIN (PORCINE) PER 1000 UNITS: Performed by: NURSE ANESTHETIST, CERTIFIED REGISTERED

## 2020-02-24 PROCEDURE — 25010000003 CEFAZOLIN IN DEXTROSE 2-4 GM/100ML-% SOLUTION: Performed by: SURGERY

## 2020-02-24 PROCEDURE — 0 IOPAMIDOL PER 1 ML: Performed by: SURGERY

## 2020-02-24 PROCEDURE — 25010000003 LIDOCAINE 1 % SOLUTION 20 ML VIAL: Performed by: SURGERY

## 2020-02-24 PROCEDURE — 75710 ARTERY X-RAYS ARM/LEG: CPT

## 2020-02-24 DEVICE — STNT PERIPH ZILVER PTX 6F 6X60MM 125CM BX/1: Type: IMPLANTABLE DEVICE | Status: FUNCTIONAL

## 2020-02-24 DEVICE — ICAST COVERED STENT, 6MMX38MMX120CM
Type: IMPLANTABLE DEVICE | Status: FUNCTIONAL
Brand: ICAST

## 2020-02-24 DEVICE — STNT PERIPH ZILVER PTX 6F 6X40MM 125CM BX/1: Type: IMPLANTABLE DEVICE | Status: FUNCTIONAL

## 2020-02-24 DEVICE — STNT PERIPH ZILVER PTX 6F 6X120MM 125CM BX/1: Type: IMPLANTABLE DEVICE | Status: FUNCTIONAL

## 2020-02-24 RX ORDER — SODIUM CHLORIDE, SODIUM LACTATE, POTASSIUM CHLORIDE, CALCIUM CHLORIDE 600; 310; 30; 20 MG/100ML; MG/100ML; MG/100ML; MG/100ML
9 INJECTION, SOLUTION INTRAVENOUS CONTINUOUS
Status: DISCONTINUED | OUTPATIENT
Start: 2020-02-24 | End: 2020-02-24 | Stop reason: HOSPADM

## 2020-02-24 RX ORDER — CEFAZOLIN SODIUM 2 G/100ML
2 INJECTION, SOLUTION INTRAVENOUS ONCE
Status: COMPLETED | OUTPATIENT
Start: 2020-02-24 | End: 2020-02-24

## 2020-02-24 RX ORDER — TRAMADOL HYDROCHLORIDE 50 MG/1
50 TABLET ORAL ONCE AS NEEDED
Status: COMPLETED | OUTPATIENT
Start: 2020-02-24 | End: 2020-02-24

## 2020-02-24 RX ORDER — LIDOCAINE HYDROCHLORIDE 10 MG/ML
0.5 INJECTION, SOLUTION EPIDURAL; INFILTRATION; INTRACAUDAL; PERINEURAL ONCE AS NEEDED
Status: DISCONTINUED | OUTPATIENT
Start: 2020-02-24 | End: 2020-02-24 | Stop reason: HOSPADM

## 2020-02-24 RX ORDER — PROMETHAZINE HYDROCHLORIDE 25 MG/1
25 TABLET ORAL ONCE AS NEEDED
Status: DISCONTINUED | OUTPATIENT
Start: 2020-02-24 | End: 2020-02-24 | Stop reason: HOSPADM

## 2020-02-24 RX ORDER — DEXTROSE MONOHYDRATE 25 G/50ML
25 INJECTION, SOLUTION INTRAVENOUS ONCE
Status: COMPLETED | OUTPATIENT
Start: 2020-02-24 | End: 2020-02-24

## 2020-02-24 RX ORDER — TRAMADOL HYDROCHLORIDE 50 MG/1
50 TABLET ORAL EVERY 6 HOURS PRN
Status: ON HOLD | COMMUNITY
End: 2020-11-24

## 2020-02-24 RX ORDER — SODIUM CHLORIDE 0.9 % (FLUSH) 0.9 %
3-10 SYRINGE (ML) INJECTION AS NEEDED
Status: DISCONTINUED | OUTPATIENT
Start: 2020-02-24 | End: 2020-02-24 | Stop reason: HOSPADM

## 2020-02-24 RX ORDER — PROMETHAZINE HYDROCHLORIDE 25 MG/ML
12.5 INJECTION, SOLUTION INTRAMUSCULAR; INTRAVENOUS ONCE AS NEEDED
Status: DISCONTINUED | OUTPATIENT
Start: 2020-02-24 | End: 2020-02-24 | Stop reason: HOSPADM

## 2020-02-24 RX ORDER — LIDOCAINE HYDROCHLORIDE 20 MG/ML
INJECTION, SOLUTION INFILTRATION; PERINEURAL AS NEEDED
Status: DISCONTINUED | OUTPATIENT
Start: 2020-02-24 | End: 2020-02-24 | Stop reason: SURG

## 2020-02-24 RX ORDER — PROPOFOL 10 MG/ML
VIAL (ML) INTRAVENOUS AS NEEDED
Status: DISCONTINUED | OUTPATIENT
Start: 2020-02-24 | End: 2020-02-24 | Stop reason: SURG

## 2020-02-24 RX ORDER — SODIUM CHLORIDE 0.9 % (FLUSH) 0.9 %
3 SYRINGE (ML) INJECTION EVERY 12 HOURS SCHEDULED
Status: DISCONTINUED | OUTPATIENT
Start: 2020-02-24 | End: 2020-02-24 | Stop reason: HOSPADM

## 2020-02-24 RX ORDER — HYDROMORPHONE HYDROCHLORIDE 1 MG/ML
0.5 INJECTION, SOLUTION INTRAMUSCULAR; INTRAVENOUS; SUBCUTANEOUS
Status: DISCONTINUED | OUTPATIENT
Start: 2020-02-24 | End: 2020-02-24 | Stop reason: HOSPADM

## 2020-02-24 RX ORDER — HYDRALAZINE HYDROCHLORIDE 20 MG/ML
5 INJECTION INTRAMUSCULAR; INTRAVENOUS
Status: DISCONTINUED | OUTPATIENT
Start: 2020-02-24 | End: 2020-02-24 | Stop reason: HOSPADM

## 2020-02-24 RX ORDER — PROMETHAZINE HYDROCHLORIDE 25 MG/1
25 SUPPOSITORY RECTAL ONCE AS NEEDED
Status: DISCONTINUED | OUTPATIENT
Start: 2020-02-24 | End: 2020-02-24 | Stop reason: HOSPADM

## 2020-02-24 RX ORDER — MIDAZOLAM HYDROCHLORIDE 1 MG/ML
2 INJECTION INTRAMUSCULAR; INTRAVENOUS
Status: DISCONTINUED | OUTPATIENT
Start: 2020-02-24 | End: 2020-02-24 | Stop reason: HOSPADM

## 2020-02-24 RX ORDER — LABETALOL HYDROCHLORIDE 5 MG/ML
5 INJECTION, SOLUTION INTRAVENOUS
Status: DISCONTINUED | OUTPATIENT
Start: 2020-02-24 | End: 2020-02-24 | Stop reason: HOSPADM

## 2020-02-24 RX ORDER — HYDROCODONE BITARTRATE AND ACETAMINOPHEN 7.5; 325 MG/1; MG/1
1 TABLET ORAL ONCE AS NEEDED
Status: DISCONTINUED | OUTPATIENT
Start: 2020-02-24 | End: 2020-02-24 | Stop reason: HOSPADM

## 2020-02-24 RX ORDER — MIDAZOLAM HYDROCHLORIDE 1 MG/ML
1 INJECTION INTRAMUSCULAR; INTRAVENOUS
Status: DISCONTINUED | OUTPATIENT
Start: 2020-02-24 | End: 2020-02-24 | Stop reason: HOSPADM

## 2020-02-24 RX ORDER — FAMOTIDINE 10 MG/ML
20 INJECTION, SOLUTION INTRAVENOUS ONCE
Status: COMPLETED | OUTPATIENT
Start: 2020-02-24 | End: 2020-02-24

## 2020-02-24 RX ORDER — OXYCODONE AND ACETAMINOPHEN 7.5; 325 MG/1; MG/1
1 TABLET ORAL ONCE AS NEEDED
Status: DISCONTINUED | OUTPATIENT
Start: 2020-02-24 | End: 2020-02-24 | Stop reason: HOSPADM

## 2020-02-24 RX ORDER — DIPHENHYDRAMINE HYDROCHLORIDE 50 MG/ML
12.5 INJECTION INTRAMUSCULAR; INTRAVENOUS
Status: DISCONTINUED | OUTPATIENT
Start: 2020-02-24 | End: 2020-02-24 | Stop reason: HOSPADM

## 2020-02-24 RX ORDER — DIPHENHYDRAMINE HCL 25 MG
25 CAPSULE ORAL
Status: DISCONTINUED | OUTPATIENT
Start: 2020-02-24 | End: 2020-02-24 | Stop reason: HOSPADM

## 2020-02-24 RX ORDER — ACETAMINOPHEN 325 MG/1
650 TABLET ORAL ONCE AS NEEDED
Status: DISCONTINUED | OUTPATIENT
Start: 2020-02-24 | End: 2020-02-24 | Stop reason: HOSPADM

## 2020-02-24 RX ORDER — FLUMAZENIL 0.1 MG/ML
0.2 INJECTION INTRAVENOUS AS NEEDED
Status: DISCONTINUED | OUTPATIENT
Start: 2020-02-24 | End: 2020-02-24 | Stop reason: HOSPADM

## 2020-02-24 RX ORDER — ONDANSETRON 2 MG/ML
4 INJECTION INTRAMUSCULAR; INTRAVENOUS ONCE AS NEEDED
Status: DISCONTINUED | OUTPATIENT
Start: 2020-02-24 | End: 2020-02-24 | Stop reason: HOSPADM

## 2020-02-24 RX ORDER — FENTANYL CITRATE 50 UG/ML
INJECTION, SOLUTION INTRAMUSCULAR; INTRAVENOUS AS NEEDED
Status: DISCONTINUED | OUTPATIENT
Start: 2020-02-24 | End: 2020-02-24 | Stop reason: SURG

## 2020-02-24 RX ORDER — FENTANYL CITRATE 50 UG/ML
50 INJECTION, SOLUTION INTRAMUSCULAR; INTRAVENOUS
Status: DISCONTINUED | OUTPATIENT
Start: 2020-02-24 | End: 2020-02-24 | Stop reason: HOSPADM

## 2020-02-24 RX ORDER — HEPARIN SODIUM 1000 [USP'U]/ML
INJECTION, SOLUTION INTRAVENOUS; SUBCUTANEOUS AS NEEDED
Status: DISCONTINUED | OUTPATIENT
Start: 2020-02-24 | End: 2020-02-24 | Stop reason: SURG

## 2020-02-24 RX ORDER — PROPOFOL 10 MG/ML
VIAL (ML) INTRAVENOUS CONTINUOUS PRN
Status: DISCONTINUED | OUTPATIENT
Start: 2020-02-24 | End: 2020-02-24 | Stop reason: SURG

## 2020-02-24 RX ORDER — EPHEDRINE SULFATE 50 MG/ML
5 INJECTION, SOLUTION INTRAVENOUS ONCE AS NEEDED
Status: DISCONTINUED | OUTPATIENT
Start: 2020-02-24 | End: 2020-02-24 | Stop reason: HOSPADM

## 2020-02-24 RX ORDER — PROMETHAZINE HYDROCHLORIDE 25 MG/ML
6.25 INJECTION, SOLUTION INTRAMUSCULAR; INTRAVENOUS
Status: DISCONTINUED | OUTPATIENT
Start: 2020-02-24 | End: 2020-02-24 | Stop reason: HOSPADM

## 2020-02-24 RX ORDER — CLOPIDOGREL BISULFATE 75 MG/1
150 TABLET ORAL ONCE
Status: COMPLETED | OUTPATIENT
Start: 2020-02-24 | End: 2020-02-24

## 2020-02-24 RX ORDER — NALOXONE HCL 0.4 MG/ML
0.2 VIAL (ML) INJECTION AS NEEDED
Status: DISCONTINUED | OUTPATIENT
Start: 2020-02-24 | End: 2020-02-24 | Stop reason: HOSPADM

## 2020-02-24 RX ADMIN — PROPOFOL 100 MCG/KG/MIN: 10 INJECTION, EMULSION INTRAVENOUS at 07:43

## 2020-02-24 RX ADMIN — FENTANYL CITRATE 50 MCG: 50 INJECTION INTRAMUSCULAR; INTRAVENOUS at 07:40

## 2020-02-24 RX ADMIN — FAMOTIDINE 20 MG: 10 INJECTION INTRAVENOUS at 07:22

## 2020-02-24 RX ADMIN — DEXTROSE MONOHYDRATE 25 ML: 25 INJECTION, SOLUTION INTRAVENOUS at 07:22

## 2020-02-24 RX ADMIN — CLOPIDOGREL 150 MG: 75 TABLET, FILM COATED ORAL at 10:46

## 2020-02-24 RX ADMIN — IOPAMIDOL 74 ML: 510 INJECTION, SOLUTION INTRAVASCULAR at 07:38

## 2020-02-24 RX ADMIN — CEFAZOLIN SODIUM 2 G: 2 INJECTION, SOLUTION INTRAVENOUS at 07:40

## 2020-02-24 RX ADMIN — SODIUM CHLORIDE, POTASSIUM CHLORIDE, SODIUM LACTATE AND CALCIUM CHLORIDE 9 ML/HR: 600; 310; 30; 20 INJECTION, SOLUTION INTRAVENOUS at 07:00

## 2020-02-24 RX ADMIN — TRAMADOL HYDROCHLORIDE 50 MG: 50 TABLET, FILM COATED ORAL at 10:46

## 2020-02-24 RX ADMIN — LIDOCAINE HYDROCHLORIDE 60 MG: 20 INJECTION, SOLUTION INFILTRATION; PERINEURAL at 07:40

## 2020-02-24 RX ADMIN — PROPOFOL 50 MG: 10 INJECTION, EMULSION INTRAVENOUS at 07:40

## 2020-02-24 RX ADMIN — HEPARIN SODIUM 5000 UNITS: 1000 INJECTION, SOLUTION INTRAVENOUS; SUBCUTANEOUS at 07:58

## 2020-02-24 NOTE — ANESTHESIA PREPROCEDURE EVALUATION
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), hyperlipidemia,   (-) past MI, angina, CHF, orthopnea, PND, KEE      Neuro/Psych  (+) psychiatric history Anxiety and Depression,     (-) seizures, neuromuscular disease, TIA, CVA, dizziness/light headedness, weakness, numbness  GI/Hepatic/Renal/Endo    (+)  GERD,  diabetes mellitus type 2 poorly controlled, thyroid problem hypothyroidism  (-) liver disease    Musculoskeletal     (+) arthralgias, back pain, myalgias,   Abdominal    Substance History - negative use  (-) alcohol use, drug use     OB/GYN negative ob/gyn ROS         Other - negative ROS                         Anesthesia Plan    ASA 4     MAC     intravenous induction     Anesthetic plan, all risks, benefits, and alternatives have been provided, discussed and informed consent has been obtained with: patient.

## 2020-02-24 NOTE — ANESTHESIA POSTPROCEDURE EVALUATION
"Patient: Gwendolyn SIMON    Procedure Summary     Date:  02/24/20 Room / Location:  [facility]    Anesthesia Start:  0733 Anesthesia Stop:  0938    Procedure:  AORTO ILIAC FEMORAL WITH LEFT SUPERFICIAL FEMORAL ARTERY AND LEFT COMMON ILIAC PERCUTANEOUS TRANSLUMINAL ANGIOPLASTY STENT (Left Thigh) Diagnosis:      Surgeon:  Tanisha Romero Jr., MD Provider:  Neo Harris MD    Anesthesia Type:  MAC ASA Status:  4          Anesthesia Type: MAC    Vitals  Vitals Value Taken Time   /80 2/24/2020  9:35 AM   Temp     Pulse 103 2/24/2020  9:35 AM   Resp 16 2/24/2020  9:35 AM   SpO2 100 % 2/24/2020  9:35 AM           Post Anesthesia Care and Evaluation    Patient location during evaluation: bedside  Patient participation: complete - patient participated  Level of consciousness: awake and alert  Pain management: adequate  Airway patency: patent  Anesthetic complications: No anesthetic complications    Cardiovascular status: acceptable  Respiratory status: acceptable  Hydration status: acceptable    Comments: /80 (BP Location: Right arm, Patient Position: Lying)   Pulse 103   Temp 36.4 °C (97.5 °F) (Oral)   Resp 16   Ht 157.5 cm (62\")   Wt 43.2 kg (95 lb 3 oz)   SpO2 100%   BMI 17.41 kg/m²       "

## 2020-02-24 NOTE — PERIOPERATIVE NURSING NOTE
NOTIFIED [facility] (DR. ZHANG) PTS BLOOD SUGAR IS 71. ORDER RECEIVED TO GIVE 1/2 AMP OF D50. OR NURSE AT BEDSIDE AND INFORMED.

## 2020-02-24 NOTE — OP NOTE
Operative Note  Date of Admission:  2/24/2020  OR Date: 2/24/2020    Pre-op Diagnosis:   Peripheral artery occlusive disease with recurrent stenosis left superficial femoral-popliteal arteries    Post-op Diagnosis:     Same, occlusion of left distal superficial femoral and popliteal arteries    Procedure:   1) duplex ultrasound-guided percutaneous access of the common femoral arteries bilaterally with abdominal aortogram; covered stent placement of left common iliac artery; contralateral selection of the left distal popliteal artery with balloon angioplasty of left superficial femoral and popliteal arteries with Zilver PTX stent placement left superficial femoral and popliteal arteries using 6 mm x 120 mm, 6 mm x 60 mm, 6 mm x 40 mm stents; completion angiogram    Surgeon: Tanisha Romero Jr, MD    Assistant: JASON Hanley CSA and they provided critical assistance during the case including suctioning, exposure, retraction, and reduction of blood loss.    Anesthesia: Monitored Anesthesia Care    Staff:   Circulator: Betsy Ann RN  Scrub Person: Omayra Stark  Assistant: Norma Baird CSA  Vascular Radiology Technician: Joss Troy    Estimated Blood Loss: Minimal    Specimens: None    Complications: None    Findings: Total occlusion of the distal superficial femoral and proximal popliteal arteries.  After traversing the total occlusion and balloon angioplasty there was still some dissection noted.  Therefore stenting was performed.    Indications:  As in preop diagnosis           Procedure: The patient was placed on the operating table in supine position.  After satisfactory level of intravenous sedation was given the patient was prepped from the umbilicus to the knees using ChloraPrep and draped in the usual sterile fashion.  Using duplex ultrasound the right common femoral artery was identified.  Local anesthetic was infiltrated into the skin and subcutaneous tissue.  The femoral artery was  accessed percutaneously.  Guidewire was advanced.  A 6 Bahamian sheath was placed over the guidewire followed by pigtail catheter.  An abdominal aortogram was performed.  This demonstrated normal renal arteries bilaterally with essentially normal infrarenal abdominal aorta.  Both common iliac arteries were normal.  There was stent previously placed on the left which was patent.  Both hypogastric arteries were normal.  Both external iliac arteries were normal.  Both common femoral arteries and profundofemoral arteries were normal.  5000 units of heparin was then given intravenously.  A guidewire was placed through the pigtail catheter and the pigtail catheter removed.  A rim catheter was placed over the guidewire.  The rim catheter was used to select the contralateral left common iliac artery.  The guidewire was advanced down into the superficial femoral artery.  The straight multi-sidehole catheter was placed over the guidewire and positioned in the left common femoral artery.  A left lower extremity angiogram was performed.  This demonstrated 2 areas of severe stenosis in the superficial femoral artery with total occlusion distally involving the proximal popliteal artery as well.  The distal popliteal artery was small but normal.  Primary runoff was via the anterior tibial artery but there was a patent tibioperoneal trunk and peroneal artery.  A 0.035 advantage guidewire was placed through the multi-sidehole catheter and attempts were made to place a 6 Bahamian by 55 cm long sheath.  There was hanging up at the iliac stent and infolding of the stent.  Apparently the guidewire had gone through 1 of the interstices of the stent.  Therefore, duplex ultrasound was used to access the left common femoral artery after local anesthetic was given.  The guidewire was advanced through the stent.  A 7 Bahamian sheath was placed over the guidewire.  A 5 mm x 40 mm angioplasty balloon was placed over the guidewire and the stent was  redilated.  A 6 mm x 38 mm covered I cast stent was placed over the guidewire and placed in this area.  It was dilated up to 12 ross.  The catheter was deflated and removed.  A hand-held injection through the sheath confirmed this area to be widely patent.  The puncture site was treated with a Star close device.  The rim catheter was then used again from the right side to the guidewire through this stent into the left superficial femoral artery.  The 6 Hebrew by 55 cm long sheath was placed over the guidewire and into the left superficial femoral artery.  Using the straight multi-sidehole catheter and the guidewire was able to traverse the total occlusion.  This area was dilated with a 4 mm x 120 mm balloon.  After this an injection demonstrated patency of the vessel but still irregularity and dissection.  A 6 mm x 120 mm Zilver PTX stent was used.  It was postdilated with a 4 mm balloon.  There was still another high-grade stenosis more proximal to this and a 6 mm x 60 mm PTX stent was placed here.  This was postdilated with a 4 mm balloon.  Following this an angiogram through the sheath demonstrated complete resolution of these stenotic and occluded areas.  There was still a small area of dissection distal to the stent in the popliteal artery.  Therefore a 6 mm x 40 mm Zilver PTX stent was used.  There was approximately 15 mm of overlap.  It was postdilated with a 4 mm balloon.  Hand-held injection through the sheath demonstrated resolution of all stenotic areas with brisk flow into the distal popliteal artery and runoff vessels.  The puncture site was treated with a Star close device.  Immediate hemostasis was achieved.  Sterile dressings were applied.  The patient was transported to the recovery area with marked improved Doppler signals on the left.        Radiographic Findings:  1) as described above      There are no hospital problems to display for this patient.     Tanisha Romero Jr., MD     Date:  2/24/2020  Time: 9:35 AM

## 2020-02-24 NOTE — DISCHARGE INSTRUCTIONS
Resume plavix tomorrow (you have had your plavix today already).  Hold Metformin for 48 hrs.  You have had a dose of tramadol at 10:46 AM.        [facility]  Nick Swanson, Fabrizio, Jacqueline Hillman Scherrer, Thomas  [address]  [place] [id number] [phone]      Discharge Instructions for Angiogram    1. Go home, rest and take it easy today.      2. You may experience some dizziness or memory loss from the anesthesia.  This may last for the next 24 hours.  Someone should plan on staying with you for the first 24 hours for your safety.    3. Do not make any important legal decisions or sign any legal papers for the next 24 hours.      4. Eat and drink lightly today.  Start off with liquids, jello, soup, crackers or other bland foods at first. You may advance your diet tomorrow as tolerated as long as you do not experience any nausea or vomiting.    5. You may resume routine medications including blood thinners. However, Glucophage should be not be started for 72 hours after the dye is given.      6. No lifting over 10 pounds and no strenuous activity for the next 2-3 days.    7. Try not to strain or bear down when your bowels move or when you empty your bladder.    8. Limit going up and down steps for 2 days.    9. No driving for the remainder of the day.  You may resume limited driving tomorrow if necessary.     10.  You may shower tomorrow.  No bath or hot tubs for at least 2 days after the procedure.          11. Leave the pressure dressing on until tomorrow morning.  You may then take this off, as well as the small see through dressing with gauze tomorrow.  If it doesn’t come off easily, do not pull this off.  If it is stuck, shower and let the warm water loosen it before removal.       12. Check your groin dressing regularly for bleeding through the dressing (under the pressure dressing).  A small amount of blood contained by the gauze is normal; the whole dressing  should not be filled with blood or leaking out under the sides.     13. If you experience bleeding through the gauze/clear sticky dressing, lay flat and have someone apply direct pressure for 15 minutes.  If bleeding has stopped after this, you may put a clean gauze and tape over the area.  Continue to lie flat for 1-2 hours.  If bleeding continues after 15 minutes of pressure, call us at the number listed above.  There is an MD available after hours.      14. If you experience heavy bleeding or large swelling, continue to hold firm pressure and              call 911.  Do not call the MD on call.     15.  If you experience pain or discomfort you may take Tylenol or Ibuprofen, whichever you normally use for minor discomfort, unless otherwise instructed.       16.  If the MD gives you a prescription for narcotic pain pills (Tylenol #3, Vicodin, Hydrocodone, Oxycodone or Percocet), you cannot drive a vehicle or operate machinery while taking these.    17.  Severe pain is not expected after this procedure.  If you experience severe pain, please call our office at [phone].    18.  Remember to contact our office for any of the following:    • Fever > 101 degrees  • Severe pain that cannot be controlled by taking your pain pills  • Severe nausea or vomiting   • Significant bleeding of your incisions  • Drainage that has a bad smell or is yellow or green in appearance  • Any other questions or concerns

## 2020-04-13 ENCOUNTER — ANESTHESIA (OUTPATIENT)
Dept: PERIOP | Facility: HOSPITAL | Age: 75
End: 2020-04-13

## 2020-04-13 ENCOUNTER — ANESTHESIA EVENT (OUTPATIENT)
Dept: PERIOP | Facility: HOSPITAL | Age: 75
End: 2020-04-13

## 2020-04-13 ENCOUNTER — HOSPITAL ENCOUNTER (INPATIENT)
Facility: HOSPITAL | Age: 75
LOS: 3 days | Discharge: HOME HEALTH | End: 2020-04-16
Attending: SURGERY | Admitting: SURGERY

## 2020-04-13 DIAGNOSIS — I73.9 PVD (PERIPHERAL VASCULAR DISEASE) WITH CLAUDICATION (HCC): Primary | ICD-10-CM

## 2020-04-13 PROBLEM — I70.25 ATHEROSCLEROSIS OF NATIVE ARTERY OF EXTREMITY WITH ULCERATION (HCC): Status: ACTIVE | Noted: 2020-04-13

## 2020-04-13 LAB
ALBUMIN SERPL-MCNC: 3.8 G/DL (ref 3.5–5.2)
ALBUMIN/GLOB SERPL: 1.1 G/DL
ALP SERPL-CCNC: 57 U/L (ref 39–117)
ALT SERPL W P-5'-P-CCNC: 15 U/L (ref 1–33)
ANION GAP SERPL CALCULATED.3IONS-SCNC: 12.9 MMOL/L (ref 5–15)
AST SERPL-CCNC: 14 U/L (ref 1–32)
BILIRUB SERPL-MCNC: 0.4 MG/DL (ref 0.2–1.2)
BUN BLD-MCNC: 11 MG/DL (ref 8–23)
BUN/CREAT SERPL: 14.7 (ref 7–25)
CALCIUM SPEC-SCNC: 8.9 MG/DL (ref 8.6–10.5)
CHLORIDE SERPL-SCNC: 98 MMOL/L (ref 98–107)
CO2 SERPL-SCNC: 26.1 MMOL/L (ref 22–29)
CREAT BLD-MCNC: 0.75 MG/DL (ref 0.57–1)
DEPRECATED RDW RBC AUTO: 41.5 FL (ref 37–54)
ERYTHROCYTE [DISTWIDTH] IN BLOOD BY AUTOMATED COUNT: 13.2 % (ref 12.3–15.4)
GFR SERPL CREATININE-BSD FRML MDRD: 76 ML/MIN/1.73
GFR SERPL CREATININE-BSD FRML MDRD: 92 ML/MIN/1.73
GLOBULIN UR ELPH-MCNC: 3.6 GM/DL
GLUCOSE BLD-MCNC: 188 MG/DL (ref 65–99)
GLUCOSE BLDC GLUCOMTR-MCNC: 229 MG/DL (ref 70–130)
HCT VFR BLD AUTO: 32.6 % (ref 34–46.6)
HGB BLD-MCNC: 11.2 G/DL (ref 12–15.9)
MCH RBC QN AUTO: 29.7 PG (ref 26.6–33)
MCHC RBC AUTO-ENTMCNC: 34.4 G/DL (ref 31.5–35.7)
MCV RBC AUTO: 86.5 FL (ref 79–97)
PLATELET # BLD AUTO: 220 10*3/MM3 (ref 140–450)
PMV BLD AUTO: 10.4 FL (ref 6–12)
POTASSIUM BLD-SCNC: 4.1 MMOL/L (ref 3.5–5.2)
PROT SERPL-MCNC: 7.4 G/DL (ref 6–8.5)
RBC # BLD AUTO: 3.77 10*6/MM3 (ref 3.77–5.28)
SODIUM BLD-SCNC: 137 MMOL/L (ref 136–145)
WBC NRBC COR # BLD: 5.29 10*3/MM3 (ref 3.4–10.8)

## 2020-04-13 PROCEDURE — 25010000003 CEFAZOLIN IN DEXTROSE 2-4 GM/100ML-% SOLUTION: Performed by: SURGERY

## 2020-04-13 PROCEDURE — 25010000003 CEFAZOLIN PER 500 MG: Performed by: SURGERY

## 2020-04-13 PROCEDURE — 82962 GLUCOSE BLOOD TEST: CPT

## 2020-04-13 PROCEDURE — 25010000002 ONDANSETRON PER 1 MG: Performed by: NURSE ANESTHETIST, CERTIFIED REGISTERED

## 2020-04-13 PROCEDURE — 25010000002 MORPHINE PER 10 MG: Performed by: SURGERY

## 2020-04-13 PROCEDURE — 25010000002 VANCOMYCIN 1 G RECONSTITUTED SOLUTION

## 2020-04-13 PROCEDURE — 25010000002 HEPARIN (PORCINE) PER 1000 UNITS: Performed by: SURGERY

## 2020-04-13 PROCEDURE — 25010000002 PHENYLEPHRINE PER 1 ML: Performed by: NURSE ANESTHETIST, CERTIFIED REGISTERED

## 2020-04-13 PROCEDURE — 25010000002 ONDANSETRON PER 1 MG: Performed by: SURGERY

## 2020-04-13 PROCEDURE — C1768 GRAFT, VASCULAR: HCPCS | Performed by: SURGERY

## 2020-04-13 PROCEDURE — 25010000002 DEXAMETHASONE PER 1 MG: Performed by: NURSE ANESTHETIST, CERTIFIED REGISTERED

## 2020-04-13 PROCEDURE — 25010000002 PROTAMINE SULFATE PER 10 MG: Performed by: NURSE ANESTHETIST, CERTIFIED REGISTERED

## 2020-04-13 PROCEDURE — 85027 COMPLETE CBC AUTOMATED: CPT | Performed by: SURGERY

## 2020-04-13 PROCEDURE — 25010000002 SUCCINYLCHOLINE PER 20 MG: Performed by: NURSE ANESTHETIST, CERTIFIED REGISTERED

## 2020-04-13 PROCEDURE — 25010000002 NEOSTIGMINE PER 0.5 MG: Performed by: NURSE ANESTHETIST, CERTIFIED REGISTERED

## 2020-04-13 PROCEDURE — 25010000002 FENTANYL CITRATE (PF) 100 MCG/2ML SOLUTION: Performed by: NURSE ANESTHETIST, CERTIFIED REGISTERED

## 2020-04-13 PROCEDURE — 25010000002 PROPOFOL 10 MG/ML EMULSION: Performed by: NURSE ANESTHETIST, CERTIFIED REGISTERED

## 2020-04-13 PROCEDURE — 80053 COMPREHEN METABOLIC PANEL: CPT | Performed by: SURGERY

## 2020-04-13 PROCEDURE — 041L0KL BYPASS LEFT FEMORAL ARTERY TO POPLITEAL ARTERY WITH NONAUTOLOGOUS TISSUE SUBSTITUTE, OPEN APPROACH: ICD-10-PCS | Performed by: SURGERY

## 2020-04-13 PROCEDURE — 06JY0ZZ INSPECTION OF LOWER VEIN, OPEN APPROACH: ICD-10-PCS | Performed by: SURGERY

## 2020-04-13 PROCEDURE — 25010000002 HEPARIN (PORCINE) PER 1000 UNITS: Performed by: NURSE ANESTHETIST, CERTIFIED REGISTERED

## 2020-04-13 DEVICE — IMPLANTABLE DEVICE: Type: IMPLANTABLE DEVICE | Site: LEG | Status: FUNCTIONAL

## 2020-04-13 RX ORDER — HEPARIN SODIUM 1000 [USP'U]/ML
INJECTION, SOLUTION INTRAVENOUS; SUBCUTANEOUS AS NEEDED
Status: DISCONTINUED | OUTPATIENT
Start: 2020-04-13 | End: 2020-04-13 | Stop reason: SURG

## 2020-04-13 RX ORDER — DULOXETIN HYDROCHLORIDE 60 MG/1
60 CAPSULE, DELAYED RELEASE ORAL DAILY
Status: DISCONTINUED | OUTPATIENT
Start: 2020-04-13 | End: 2020-04-16 | Stop reason: HOSPADM

## 2020-04-13 RX ORDER — LABETALOL HYDROCHLORIDE 5 MG/ML
INJECTION, SOLUTION INTRAVENOUS
Status: COMPLETED
Start: 2020-04-13 | End: 2020-04-13

## 2020-04-13 RX ORDER — DEXAMETHASONE SODIUM PHOSPHATE 4 MG/ML
INJECTION, SOLUTION INTRA-ARTICULAR; INTRALESIONAL; INTRAMUSCULAR; INTRAVENOUS; SOFT TISSUE AS NEEDED
Status: DISCONTINUED | OUTPATIENT
Start: 2020-04-13 | End: 2020-04-13 | Stop reason: SURG

## 2020-04-13 RX ORDER — ONDANSETRON 4 MG/1
4 TABLET, FILM COATED ORAL EVERY 6 HOURS PRN
Status: DISCONTINUED | OUTPATIENT
Start: 2020-04-13 | End: 2020-04-16 | Stop reason: HOSPADM

## 2020-04-13 RX ORDER — FLUMAZENIL 0.1 MG/ML
0.2 INJECTION INTRAVENOUS AS NEEDED
Status: DISCONTINUED | OUTPATIENT
Start: 2020-04-13 | End: 2020-04-13 | Stop reason: HOSPADM

## 2020-04-13 RX ORDER — SODIUM CHLORIDE 0.9 % (FLUSH) 0.9 %
10 SYRINGE (ML) INJECTION AS NEEDED
Status: DISCONTINUED | OUTPATIENT
Start: 2020-04-13 | End: 2020-04-13 | Stop reason: HOSPADM

## 2020-04-13 RX ORDER — CEFAZOLIN SODIUM 2 G/100ML
2 INJECTION, SOLUTION INTRAVENOUS EVERY 8 HOURS
Status: COMPLETED | OUTPATIENT
Start: 2020-04-13 | End: 2020-04-14

## 2020-04-13 RX ORDER — SODIUM CHLORIDE, SODIUM LACTATE, POTASSIUM CHLORIDE, CALCIUM CHLORIDE 600; 310; 30; 20 MG/100ML; MG/100ML; MG/100ML; MG/100ML
9 INJECTION, SOLUTION INTRAVENOUS CONTINUOUS
Status: DISCONTINUED | OUTPATIENT
Start: 2020-04-13 | End: 2020-04-14

## 2020-04-13 RX ORDER — GABAPENTIN 400 MG/1
400 CAPSULE ORAL DAILY
Status: DISCONTINUED | OUTPATIENT
Start: 2020-04-13 | End: 2020-04-16 | Stop reason: HOSPADM

## 2020-04-13 RX ORDER — GLYCOPYRROLATE 0.2 MG/ML
INJECTION INTRAMUSCULAR; INTRAVENOUS AS NEEDED
Status: DISCONTINUED | OUTPATIENT
Start: 2020-04-13 | End: 2020-04-13 | Stop reason: SURG

## 2020-04-13 RX ORDER — CLOPIDOGREL BISULFATE 75 MG/1
75 TABLET ORAL DAILY
Status: DISCONTINUED | OUTPATIENT
Start: 2020-04-13 | End: 2020-04-16 | Stop reason: HOSPADM

## 2020-04-13 RX ORDER — HYDROCODONE BITARTRATE AND ACETAMINOPHEN 7.5; 325 MG/1; MG/1
1 TABLET ORAL ONCE AS NEEDED
Status: DISCONTINUED | OUTPATIENT
Start: 2020-04-13 | End: 2020-04-13 | Stop reason: HOSPADM

## 2020-04-13 RX ORDER — ATORVASTATIN CALCIUM 10 MG/1
10 TABLET, FILM COATED ORAL DAILY
Status: DISCONTINUED | OUTPATIENT
Start: 2020-04-13 | End: 2020-04-16 | Stop reason: HOSPADM

## 2020-04-13 RX ORDER — MORPHINE SULFATE 10 MG/ML
1 INJECTION INTRAMUSCULAR; INTRAVENOUS; SUBCUTANEOUS EVERY 4 HOURS PRN
Status: DISCONTINUED | OUTPATIENT
Start: 2020-04-13 | End: 2020-04-13

## 2020-04-13 RX ORDER — ONDANSETRON 2 MG/ML
INJECTION INTRAMUSCULAR; INTRAVENOUS AS NEEDED
Status: DISCONTINUED | OUTPATIENT
Start: 2020-04-13 | End: 2020-04-13 | Stop reason: SURG

## 2020-04-13 RX ORDER — CEFAZOLIN SODIUM 2 G/100ML
2 INJECTION, SOLUTION INTRAVENOUS ONCE
Status: COMPLETED | OUTPATIENT
Start: 2020-04-13 | End: 2020-04-13

## 2020-04-13 RX ORDER — TRAMADOL HYDROCHLORIDE 50 MG/1
50 TABLET ORAL EVERY 6 HOURS PRN
Status: DISCONTINUED | OUTPATIENT
Start: 2020-04-13 | End: 2020-04-13 | Stop reason: SDUPTHER

## 2020-04-13 RX ORDER — FENTANYL CITRATE 50 UG/ML
50 INJECTION, SOLUTION INTRAMUSCULAR; INTRAVENOUS
Status: DISCONTINUED | OUTPATIENT
Start: 2020-04-13 | End: 2020-04-13 | Stop reason: HOSPADM

## 2020-04-13 RX ORDER — HYDROMORPHONE HYDROCHLORIDE 1 MG/ML
0.5 INJECTION, SOLUTION INTRAMUSCULAR; INTRAVENOUS; SUBCUTANEOUS
Status: DISCONTINUED | OUTPATIENT
Start: 2020-04-13 | End: 2020-04-13 | Stop reason: HOSPADM

## 2020-04-13 RX ORDER — HYDRALAZINE HYDROCHLORIDE 20 MG/ML
5 INJECTION INTRAMUSCULAR; INTRAVENOUS
Status: DISCONTINUED | OUTPATIENT
Start: 2020-04-13 | End: 2020-04-13 | Stop reason: HOSPADM

## 2020-04-13 RX ORDER — BUSPIRONE HYDROCHLORIDE 10 MG/1
10 TABLET ORAL DAILY
Status: DISCONTINUED | OUTPATIENT
Start: 2020-04-13 | End: 2020-04-16 | Stop reason: HOSPADM

## 2020-04-13 RX ORDER — ACETAMINOPHEN 325 MG/1
650 TABLET ORAL ONCE AS NEEDED
Status: DISCONTINUED | OUTPATIENT
Start: 2020-04-13 | End: 2020-04-13 | Stop reason: HOSPADM

## 2020-04-13 RX ORDER — EPHEDRINE SULFATE 50 MG/ML
5 INJECTION, SOLUTION INTRAVENOUS ONCE AS NEEDED
Status: DISCONTINUED | OUTPATIENT
Start: 2020-04-13 | End: 2020-04-13 | Stop reason: HOSPADM

## 2020-04-13 RX ORDER — PROMETHAZINE HYDROCHLORIDE 25 MG/ML
12.5 INJECTION, SOLUTION INTRAMUSCULAR; INTRAVENOUS ONCE AS NEEDED
Status: DISCONTINUED | OUTPATIENT
Start: 2020-04-13 | End: 2020-04-13 | Stop reason: HOSPADM

## 2020-04-13 RX ORDER — SODIUM CHLORIDE 0.9 % (FLUSH) 0.9 %
10 SYRINGE (ML) INJECTION EVERY 12 HOURS SCHEDULED
Status: DISCONTINUED | OUTPATIENT
Start: 2020-04-13 | End: 2020-04-13 | Stop reason: HOSPADM

## 2020-04-13 RX ORDER — MORPHINE SULFATE 2 MG/ML
1 INJECTION, SOLUTION INTRAMUSCULAR; INTRAVENOUS EVERY 4 HOURS PRN
Status: DISCONTINUED | OUTPATIENT
Start: 2020-04-13 | End: 2020-04-16 | Stop reason: HOSPADM

## 2020-04-13 RX ORDER — ONDANSETRON 2 MG/ML
4 INJECTION INTRAMUSCULAR; INTRAVENOUS EVERY 6 HOURS PRN
Status: DISCONTINUED | OUTPATIENT
Start: 2020-04-13 | End: 2020-04-16 | Stop reason: HOSPADM

## 2020-04-13 RX ORDER — DIPHENHYDRAMINE HYDROCHLORIDE 50 MG/ML
12.5 INJECTION INTRAMUSCULAR; INTRAVENOUS
Status: DISCONTINUED | OUTPATIENT
Start: 2020-04-13 | End: 2020-04-13 | Stop reason: HOSPADM

## 2020-04-13 RX ORDER — TRAMADOL HYDROCHLORIDE 50 MG/1
50 TABLET ORAL EVERY 6 HOURS PRN
Status: DISCONTINUED | OUTPATIENT
Start: 2020-04-13 | End: 2020-04-16 | Stop reason: HOSPADM

## 2020-04-13 RX ORDER — TRAZODONE HYDROCHLORIDE 50 MG/1
50 TABLET ORAL NIGHTLY
Status: DISCONTINUED | OUTPATIENT
Start: 2020-04-13 | End: 2020-04-16 | Stop reason: HOSPADM

## 2020-04-13 RX ORDER — PROMETHAZINE HYDROCHLORIDE 25 MG/ML
6.25 INJECTION, SOLUTION INTRAMUSCULAR; INTRAVENOUS
Status: DISCONTINUED | OUTPATIENT
Start: 2020-04-13 | End: 2020-04-13 | Stop reason: HOSPADM

## 2020-04-13 RX ORDER — FENTANYL CITRATE 50 UG/ML
INJECTION, SOLUTION INTRAMUSCULAR; INTRAVENOUS AS NEEDED
Status: DISCONTINUED | OUTPATIENT
Start: 2020-04-13 | End: 2020-04-13 | Stop reason: SURG

## 2020-04-13 RX ORDER — PROMETHAZINE HYDROCHLORIDE 25 MG/1
25 TABLET ORAL ONCE AS NEEDED
Status: DISCONTINUED | OUTPATIENT
Start: 2020-04-13 | End: 2020-04-13 | Stop reason: HOSPADM

## 2020-04-13 RX ORDER — SUCCINYLCHOLINE CHLORIDE 20 MG/ML
INJECTION INTRAMUSCULAR; INTRAVENOUS AS NEEDED
Status: DISCONTINUED | OUTPATIENT
Start: 2020-04-13 | End: 2020-04-13 | Stop reason: SURG

## 2020-04-13 RX ORDER — NALOXONE HCL 0.4 MG/ML
0.2 VIAL (ML) INJECTION AS NEEDED
Status: DISCONTINUED | OUTPATIENT
Start: 2020-04-13 | End: 2020-04-13 | Stop reason: HOSPADM

## 2020-04-13 RX ORDER — PROPOFOL 10 MG/ML
VIAL (ML) INTRAVENOUS AS NEEDED
Status: DISCONTINUED | OUTPATIENT
Start: 2020-04-13 | End: 2020-04-13 | Stop reason: SURG

## 2020-04-13 RX ORDER — LIDOCAINE HYDROCHLORIDE 20 MG/ML
INJECTION, SOLUTION INFILTRATION; PERINEURAL AS NEEDED
Status: DISCONTINUED | OUTPATIENT
Start: 2020-04-13 | End: 2020-04-13 | Stop reason: SURG

## 2020-04-13 RX ORDER — LEVOTHYROXINE SODIUM 0.05 MG/1
50 TABLET ORAL
Status: DISCONTINUED | OUTPATIENT
Start: 2020-04-14 | End: 2020-04-16 | Stop reason: HOSPADM

## 2020-04-13 RX ORDER — PROTAMINE SULFATE 10 MG/ML
INJECTION, SOLUTION INTRAVENOUS AS NEEDED
Status: DISCONTINUED | OUTPATIENT
Start: 2020-04-13 | End: 2020-04-13 | Stop reason: SURG

## 2020-04-13 RX ORDER — OXYCODONE AND ACETAMINOPHEN 7.5; 325 MG/1; MG/1
1 TABLET ORAL ONCE AS NEEDED
Status: DISCONTINUED | OUTPATIENT
Start: 2020-04-13 | End: 2020-04-13 | Stop reason: HOSPADM

## 2020-04-13 RX ORDER — ESMOLOL HYDROCHLORIDE 10 MG/ML
INJECTION INTRAVENOUS AS NEEDED
Status: DISCONTINUED | OUTPATIENT
Start: 2020-04-13 | End: 2020-04-13 | Stop reason: SURG

## 2020-04-13 RX ORDER — DULOXETIN HYDROCHLORIDE 60 MG/1
60 CAPSULE, DELAYED RELEASE ORAL DAILY
COMMUNITY
End: 2021-11-02

## 2020-04-13 RX ORDER — PROMETHAZINE HYDROCHLORIDE 25 MG/1
25 SUPPOSITORY RECTAL ONCE AS NEEDED
Status: DISCONTINUED | OUTPATIENT
Start: 2020-04-13 | End: 2020-04-13 | Stop reason: HOSPADM

## 2020-04-13 RX ORDER — ONDANSETRON 2 MG/ML
4 INJECTION INTRAMUSCULAR; INTRAVENOUS ONCE AS NEEDED
Status: DISCONTINUED | OUTPATIENT
Start: 2020-04-13 | End: 2020-04-13 | Stop reason: HOSPADM

## 2020-04-13 RX ORDER — FAMOTIDINE 10 MG/ML
20 INJECTION, SOLUTION INTRAVENOUS ONCE
Status: COMPLETED | OUTPATIENT
Start: 2020-04-13 | End: 2020-04-13

## 2020-04-13 RX ORDER — SODIUM CHLORIDE 9 MG/ML
100 INJECTION, SOLUTION INTRAVENOUS CONTINUOUS
Status: DISCONTINUED | OUTPATIENT
Start: 2020-04-13 | End: 2020-04-15

## 2020-04-13 RX ORDER — DIPHENHYDRAMINE HCL 25 MG
25 CAPSULE ORAL
Status: DISCONTINUED | OUTPATIENT
Start: 2020-04-13 | End: 2020-04-13 | Stop reason: HOSPADM

## 2020-04-13 RX ORDER — ROCURONIUM BROMIDE 10 MG/ML
INJECTION, SOLUTION INTRAVENOUS AS NEEDED
Status: DISCONTINUED | OUTPATIENT
Start: 2020-04-13 | End: 2020-04-13 | Stop reason: SURG

## 2020-04-13 RX ORDER — LABETALOL HYDROCHLORIDE 5 MG/ML
5 INJECTION, SOLUTION INTRAVENOUS
Status: DISCONTINUED | OUTPATIENT
Start: 2020-04-13 | End: 2020-04-13 | Stop reason: HOSPADM

## 2020-04-13 RX ORDER — NALOXONE HCL 0.4 MG/ML
0.4 VIAL (ML) INJECTION
Status: DISCONTINUED | OUTPATIENT
Start: 2020-04-13 | End: 2020-04-16 | Stop reason: HOSPADM

## 2020-04-13 RX ADMIN — LABETALOL HYDROCHLORIDE 5 MG: 5 INJECTION, SOLUTION INTRAVENOUS at 11:05

## 2020-04-13 RX ADMIN — MORPHINE SULFATE 1 MG: 2 INJECTION, SOLUTION INTRAMUSCULAR; INTRAVENOUS at 15:18

## 2020-04-13 RX ADMIN — DULOXETINE HYDROCHLORIDE 60 MG: 60 CAPSULE, DELAYED RELEASE ORAL at 17:05

## 2020-04-13 RX ADMIN — TRAZODONE HYDROCHLORIDE 50 MG: 50 TABLET ORAL at 20:46

## 2020-04-13 RX ADMIN — SODIUM CHLORIDE, POTASSIUM CHLORIDE, SODIUM LACTATE AND CALCIUM CHLORIDE 9 ML/HR: 600; 310; 30; 20 INJECTION, SOLUTION INTRAVENOUS at 07:33

## 2020-04-13 RX ADMIN — METFORMIN HYDROCHLORIDE 500 MG: 500 TABLET ORAL at 17:05

## 2020-04-13 RX ADMIN — PHENYLEPHRINE HYDROCHLORIDE 100 MCG: 10 INJECTION INTRAVENOUS at 07:56

## 2020-04-13 RX ADMIN — FENTANYL CITRATE 50 MCG: 50 INJECTION INTRAMUSCULAR; INTRAVENOUS at 07:44

## 2020-04-13 RX ADMIN — FENTANYL CITRATE 50 MCG: 50 INJECTION, SOLUTION INTRAMUSCULAR; INTRAVENOUS at 11:23

## 2020-04-13 RX ADMIN — PROPOFOL 80 MG: 10 INJECTION, EMULSION INTRAVENOUS at 08:42

## 2020-04-13 RX ADMIN — SODIUM CHLORIDE, POTASSIUM CHLORIDE, SODIUM LACTATE AND CALCIUM CHLORIDE: 600; 310; 30; 20 INJECTION, SOLUTION INTRAVENOUS at 08:45

## 2020-04-13 RX ADMIN — ONDANSETRON HYDROCHLORIDE 4 MG: 2 SOLUTION INTRAMUSCULAR; INTRAVENOUS at 10:00

## 2020-04-13 RX ADMIN — ESMOLOL HYDROCHLORIDE 10 MG: 10 INJECTION, SOLUTION INTRAVENOUS at 10:14

## 2020-04-13 RX ADMIN — LINAGLIPTIN 5 MG: 5 TABLET, FILM COATED ORAL at 17:05

## 2020-04-13 RX ADMIN — ATORVASTATIN CALCIUM 10 MG: 10 TABLET, FILM COATED ORAL at 17:05

## 2020-04-13 RX ADMIN — SODIUM CHLORIDE 100 ML/HR: 9 INJECTION, SOLUTION INTRAVENOUS at 12:30

## 2020-04-13 RX ADMIN — CEFAZOLIN SODIUM 2 G: 2 INJECTION, SOLUTION INTRAVENOUS at 23:59

## 2020-04-13 RX ADMIN — CEFAZOLIN SODIUM 2 G: 2 INJECTION, SOLUTION INTRAVENOUS at 15:47

## 2020-04-13 RX ADMIN — HEPARIN SODIUM 7500 UNITS: 1000 INJECTION, SOLUTION INTRAVENOUS; SUBCUTANEOUS at 09:04

## 2020-04-13 RX ADMIN — TRAMADOL HYDROCHLORIDE 50 MG: 50 TABLET, FILM COATED ORAL at 23:59

## 2020-04-13 RX ADMIN — FENTANYL CITRATE 50 MCG: 50 INJECTION INTRAMUSCULAR; INTRAVENOUS at 10:25

## 2020-04-13 RX ADMIN — LABETALOL HYDROCHLORIDE 5 MG: 5 INJECTION, SOLUTION INTRAVENOUS at 11:18

## 2020-04-13 RX ADMIN — GLYCOPYRROLATE 0.5 MG: 0.2 INJECTION INTRAMUSCULAR; INTRAVENOUS at 10:04

## 2020-04-13 RX ADMIN — FENTANYL CITRATE 50 MCG: 50 INJECTION INTRAMUSCULAR; INTRAVENOUS at 10:05

## 2020-04-13 RX ADMIN — CLOPIDOGREL 75 MG: 75 TABLET, FILM COATED ORAL at 17:05

## 2020-04-13 RX ADMIN — CEFAZOLIN SODIUM 2 G: 2 INJECTION, SOLUTION INTRAVENOUS at 08:10

## 2020-04-13 RX ADMIN — LIDOCAINE HYDROCHLORIDE 100 MG: 20 INJECTION, SOLUTION INFILTRATION; PERINEURAL at 07:44

## 2020-04-13 RX ADMIN — PROPOFOL 120 MG: 10 INJECTION, EMULSION INTRAVENOUS at 07:44

## 2020-04-13 RX ADMIN — BUSPIRONE HYDROCHLORIDE 10 MG: 10 TABLET ORAL at 20:46

## 2020-04-13 RX ADMIN — ONDANSETRON HYDROCHLORIDE 4 MG: 2 SOLUTION INTRAMUSCULAR; INTRAVENOUS at 15:16

## 2020-04-13 RX ADMIN — PHENYLEPHRINE HYDROCHLORIDE 100 MCG: 10 INJECTION INTRAVENOUS at 07:54

## 2020-04-13 RX ADMIN — PROTAMINE SULFATE 40 MG: 10 INJECTION, SOLUTION INTRAVENOUS at 09:52

## 2020-04-13 RX ADMIN — FENTANYL CITRATE 50 MCG: 50 INJECTION INTRAMUSCULAR; INTRAVENOUS at 08:42

## 2020-04-13 RX ADMIN — NEOSTIGMINE METHYLSULFATE 3 MG: 1 INJECTION INTRAMUSCULAR; INTRAVENOUS; SUBCUTANEOUS at 10:04

## 2020-04-13 RX ADMIN — ESMOLOL HYDROCHLORIDE 20 MG: 10 INJECTION, SOLUTION INTRAVENOUS at 10:19

## 2020-04-13 RX ADMIN — MORPHINE SULFATE 1 MG: 2 INJECTION, SOLUTION INTRAMUSCULAR; INTRAVENOUS at 20:46

## 2020-04-13 RX ADMIN — DEXAMETHASONE SODIUM PHOSPHATE 4 MG: 4 INJECTION INTRA-ARTICULAR; INTRALESIONAL; INTRAMUSCULAR; INTRAVENOUS; SOFT TISSUE at 08:00

## 2020-04-13 RX ADMIN — LOSARTAN POTASSIUM: 50 TABLET, FILM COATED ORAL at 17:05

## 2020-04-13 RX ADMIN — SUCCINYLCHOLINE CHLORIDE 100 MG: 20 INJECTION, SOLUTION INTRAMUSCULAR; INTRAVENOUS; PARENTERAL at 07:44

## 2020-04-13 RX ADMIN — ROCURONIUM BROMIDE 30 MG: 10 INJECTION, SOLUTION INTRAVENOUS at 08:15

## 2020-04-13 RX ADMIN — FAMOTIDINE 20 MG: 10 INJECTION, SOLUTION INTRAVENOUS at 07:33

## 2020-04-13 NOTE — H&P
Update    Enclosed in the chart is a telehealth visit from April 8, 2020.  History of present illness, past medical history, social and family histories and assessment and plan are all the same.  Please add the following:    Heart normal sinus without murmur  Chest: Equal bilateral expansion and symmetrical.  Unlabored breathing.  Clear to auscultation  Abdomen: Soft, benign, no masses palpated  Extremities: There is a right below-knee amputation.  There are ischemic changes to the left foot with darkened skin and discoloration of the third through fifth toes on the left foot.  There is marked dependent rubor.  There are palpable femoral pulses noted bilaterally with no palpable pulses noted distal to this    Impression:   peripheral artery occlusive disease with critical limb ischemia on the left with rest pain and skin changes.    Plan:   left femoral to below-knee popliteal bypass with translocated greater saphenous vein.  The procedure risk and benefits have been discussed in detail with the patient and multiple family members through the daughter-in-law as the .

## 2020-04-13 NOTE — ANESTHESIA POSTPROCEDURE EVALUATION
"Patient: Gwendolyn Mcwilliams    Procedure Summary     Date:  04/13/20 Room / Location:  [facility] OR [address] [place] [address] MAIN OR    Anesthesia Start:  0739 Anesthesia Stop:  1034    Procedure:  LEFT FEMORAL TIBIAL BYPASS USING TRANSLOCATED GREATER SAPHENOUS VEIN (Left Leg Lower) Diagnosis:      Surgeon:  Tanisha Romero Jr., MD Provider:  Natalia Bravo MD    Anesthesia Type:  general ASA Status:  3          Anesthesia Type: general    Vitals  Vitals Value Taken Time   /77 4/13/2020 11:30 AM   Temp 36.9 °C (98.4 °F) 4/13/2020 11:30 AM   Pulse 89 4/13/2020 11:35 AM   Resp 16 4/13/2020 11:30 AM   SpO2 100 % 4/13/2020 11:35 AM   Vitals shown include unvalidated device data.        Post Anesthesia Care and Evaluation    Patient location during evaluation: bedside  Patient participation: complete - patient participated  Level of consciousness: awake and alert  Pain management: adequate  Airway patency: patent  Anesthetic complications: No anesthetic complications    Cardiovascular status: acceptable  Respiratory status: acceptable  Hydration status: acceptable    Comments: BP (!) 160/109   Pulse 88   Temp 36.9 °C (98.4 °F) (Oral)   Resp 16   Ht 157.5 cm (62\")   Wt 44.1 kg (97 lb 3.6 oz)   SpO2 100%   BMI 17.78 kg/m²       "

## 2020-04-13 NOTE — PLAN OF CARE
Problem: Patient Care Overview  Goal: Plan of Care Review  Outcome: Ongoing (interventions implemented as appropriate)  Flowsheets (Taken 4/13/2020 1820)  Progress: improving  Plan of Care Reviewed With: patient  Outcome Summary: AAO x all.  VSS.  NSR on cardiac monitor.  Has intermittent nausea and pain.  Dressing left leg CDI.  Pulses on left (right amputee) are with doppler but can feel the PT.  Speaks no english and the iPad for translation is at bedside.  Urinary catheter in place and draining clear yellow urine.  Will continue to monitor.     Problem: Fall Risk (Adult)  Goal: Identify Related Risk Factors and Signs and Symptoms  Outcome: Ongoing (interventions implemented as appropriate)     Problem: Revascularization/PAD, Lower Extremity (Adult)  Goal: Signs and Symptoms of Listed Potential Problems Will be Absent, Minimized or Managed (Revascularization/PAD, Lower Extremity)  Outcome: Ongoing (interventions implemented as appropriate)     Problem: Skin Injury Risk (Adult)  Goal: Identify Related Risk Factors and Signs and Symptoms  Outcome: Ongoing (interventions implemented as appropriate)

## 2020-04-13 NOTE — ANESTHESIA PREPROCEDURE EVALUATION
Anesthesia Evaluation     no history of anesthetic complications:               Airway   Mallampati: I  TM distance: >3 FB  Neck ROM: full  Dental - normal exam     Pulmonary    (-) asthma, shortness of breath, no home oxygen  Cardiovascular     (+) hypertension, PVD, hyperlipidemia,   (-) dysrhythmias, angina      Neuro/Psych  (-) dizziness/light headedness, syncope  GI/Hepatic/Renal/Endo    (+)  GERD,  diabetes mellitus,     Musculoskeletal     Abdominal    Substance History      OB/GYN          Other                        Anesthesia Plan    ASA 3     general     intravenous induction     Anesthetic plan, all risks, benefits, and alternatives have been provided, discussed and informed consent has been obtained with: patient.

## 2020-04-13 NOTE — ANESTHESIA PROCEDURE NOTES
Airway  Urgency: elective    Date/Time: 4/13/2020 7:46 AM  Airway not difficult    General Information and Staff    Patient location during procedure: OR  Anesthesiologist: Natalia Bravo MD  CRNA: Daria Morris CRNA    Indications and Patient Condition  Indications for airway management: airway protection    Preoxygenated: yes  MILS maintained throughout  Mask difficulty assessment: 0 - not attempted    Final Airway Details  Final airway type: endotracheal airway      Successful airway: ETT  Cuffed: yes   Successful intubation technique: direct laryngoscopy  Facilitating devices/methods: intubating stylet  Endotracheal tube insertion site: oral  Blade: Afia  Blade size: 3  ETT size (mm): 7.0  Cormack-Lehane Classification: grade I - full view of glottis  Placement verified by: chest auscultation and capnometry   Cuff volume (mL): 8  Measured from: lips  ETT/EBT  to lips (cm): 20  Number of attempts at approach: 1  Assessment: lips, teeth, and gum same as pre-op and atraumatic intubation    Additional Comments  Patient in OR. Monitors on. BLVS. Pre 02 100%. SIVI. Poor Dentition. DL x1. DVVC. Atraumatic placement of ETT. Placement verified with ETC02 and BBS. ETT secured. Teeth/lips in pre-op condition.

## 2020-04-13 NOTE — OP NOTE
Operative Note  Date of Admission:  4/13/2020  OR Date: 4/13/2020    Pre-op Diagnosis: Peripheral artery occlusive disease with rest pain and tiny shallow ulcers fifth toe    Post-op Diagnosis:    Same    Procedure:   1) left superficial femoral to below-knee popliteal bypass with reversed cryopreserved saphenous vein    Surgeon: Tanisha Romero Jr, MD    Assistant: Anne Marie ARREGUIN, Chandana Ruiz MD and they provided critical assistance during the case including suctioning, exposure, retraction, and reduction of blood loss.    Anesthesia: General    Staff:   Circulator: Jacqueline Seymour RN  Scrub Person: Omayra Stark  Assistant: Anne Marie Londono CSA  Orientee: Jessica Ariza RN    Estimated Blood Loss: 25-50 cc    Specimens: None      Complications: None    Findings: Greater saphenous vein above the knee was inadequate for bypass grafting.  At the end of the procedure she had very strong Doppler signals noted at the posterior tibial and anterior tibial arteries and the foot was warm and well-perfused.    Indications:  As in preop diagnosis           Procedure:   The patient was placed on the operating table in supine position.  After satisfactory general endotracheal anesthesia was achieved the entire left lower extremity was prepped using ChloraPrep and draped in usual sterile fashion.  Using 2 team approach a curvilinear incision was made in the left groin extended down the medial aspect of the left thigh.  The electrocautery was used to dissect through the subcutaneous tissue to the greater saphenous vein.  Dissection was then carried out from the greater saphenous vein cephalad to the saphenofemoral junction.  Branches to the greater saphenous vein at the lucas were individually ligated with 3-0 silk suture and divided between ligatures.  Dissection was then carried out down to the lower thigh region where the vein became very small and sclerotic and absolutely inadequate for bypass  grafting.    Simultaneously an incision was made below the level of the knee longitudinally.  The electrocautery was used to dissect through the subcutaneous tissue and the fascia.  The gastrocnemius and soleus muscles were retracted posteriorly.  The popliteal fossa was entered.  The below-knee popliteal artery was isolated to the origins of the anterior tibial artery and the tibioperoneal trunk.  Subcutaneous tunnel was then fashioned between these 2 incisions.      Since there was an adequate greater saphenous vein a 5 mm cryopreserved saphenous vein was brought into the field and the appropriate thawing technique was performed.  The vein was flushed with heparinized saline solution.  7500 units of heparin was then given intravenously.  Vascular control was then obtained of the distal popliteal artery as well as anterior tibial artery and tibioperoneal trunk.  A lateral arteriotomy was made in the popliteal artery.  The vein was then reversed and cut to appropriate beveled shape and an end vein to side artery anastomosis was constructed using a running 6-0 Prolene suture beginning at each end and tying on the side.  One side was left untied and on tension.  The anterior surface of the vein was marked and then brought through the subcutaneous tunnel.  The fascia was incised at the mid thigh level and the superficial femoral artery was dissected out.  Vascular control was obtained.  A lateral arteriotomy was made in the superficial femoral artery.  The leg was straightened and the vein cut to the appropriate length.  An end vein to side artery anastomosis was performed using a running 6-0 Prolene suture beginning at each and tying on the side.  Prior to completion of this the artery was flushed and turned the vein graft flushed and lumen irrigated heparinized saline solution.  Anastomosis was completed.  There is very strong flow noted through the vein graft.  The distal end was flushed with the untied sutures and  then secured.  Using a hand-held Doppler there was a normal triphasic Doppler signal in the graft and in the distal popliteal artery distal to the anastomosis.  There were strong Doppler signals noted at the ankle in the dorsalis pedis and posterior tibial arteries.  Both incisions were irrigated with heparinized saline solution and dried.  40 mg protamine sulfate was then given intravenously.  Platelet rich plasma was sprayed into both wounds.  The fascia was closed in each 1 with a running 2-0 Vicryl suture and the subcutaneous tissue closed with running 3-0 Vicryl suture.  Skin was closed with skin staples.  Platelet poor plasma was sprayed over each incision.  Sterile dressings were applied.  The patient tolerated procedure satisfactorily.  Sponge, needle, and instrument counts reported as correct.  The patient was extubated transported to recovery room in satisfactory condition with a warm well-perfused foot and strong Doppler signals noted.        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      Tanisha Romero Jr., MD     Date: 4/13/2020  Time: 10:11

## 2020-04-14 PROBLEM — E11.9 DIABETES MELLITUS (HCC): Chronic | Status: ACTIVE | Noted: 2020-04-14

## 2020-04-14 LAB
ANION GAP SERPL CALCULATED.3IONS-SCNC: 12.6 MMOL/L (ref 5–15)
BASOPHILS # BLD AUTO: 0.01 10*3/MM3 (ref 0–0.2)
BASOPHILS NFR BLD AUTO: 0.1 % (ref 0–1.5)
BUN BLD-MCNC: 8 MG/DL (ref 8–23)
BUN/CREAT SERPL: 10.8 (ref 7–25)
CALCIUM SPEC-SCNC: 8 MG/DL (ref 8.6–10.5)
CHLORIDE SERPL-SCNC: 102 MMOL/L (ref 98–107)
CO2 SERPL-SCNC: 23.4 MMOL/L (ref 22–29)
CREAT BLD-MCNC: 0.74 MG/DL (ref 0.57–1)
DEPRECATED RDW RBC AUTO: 42.1 FL (ref 37–54)
EOSINOPHIL # BLD AUTO: 0 10*3/MM3 (ref 0–0.4)
EOSINOPHIL NFR BLD AUTO: 0 % (ref 0.3–6.2)
ERYTHROCYTE [DISTWIDTH] IN BLOOD BY AUTOMATED COUNT: 13.4 % (ref 12.3–15.4)
GFR SERPL CREATININE-BSD FRML MDRD: 77 ML/MIN/1.73
GFR SERPL CREATININE-BSD FRML MDRD: 93 ML/MIN/1.73
GLUCOSE BLD-MCNC: 214 MG/DL (ref 65–99)
GLUCOSE BLDC GLUCOMTR-MCNC: 102 MG/DL (ref 70–130)
GLUCOSE BLDC GLUCOMTR-MCNC: 124 MG/DL (ref 70–130)
GLUCOSE BLDC GLUCOMTR-MCNC: 153 MG/DL (ref 70–130)
HCT VFR BLD AUTO: 28.6 % (ref 34–46.6)
HGB BLD-MCNC: 9.8 G/DL (ref 12–15.9)
IMM GRANULOCYTES # BLD AUTO: 0.02 10*3/MM3 (ref 0–0.05)
IMM GRANULOCYTES NFR BLD AUTO: 0.3 % (ref 0–0.5)
LYMPHOCYTES # BLD AUTO: 1.27 10*3/MM3 (ref 0.7–3.1)
LYMPHOCYTES NFR BLD AUTO: 17.6 % (ref 19.6–45.3)
MCH RBC QN AUTO: 29.9 PG (ref 26.6–33)
MCHC RBC AUTO-ENTMCNC: 34.3 G/DL (ref 31.5–35.7)
MCV RBC AUTO: 87.2 FL (ref 79–97)
MONOCYTES # BLD AUTO: 1.12 10*3/MM3 (ref 0.1–0.9)
MONOCYTES NFR BLD AUTO: 15.5 % (ref 5–12)
NEUTROPHILS # BLD AUTO: 4.79 10*3/MM3 (ref 1.7–7)
NEUTROPHILS NFR BLD AUTO: 66.5 % (ref 42.7–76)
NRBC BLD AUTO-RTO: 0 /100 WBC (ref 0–0.2)
PLATELET # BLD AUTO: 183 10*3/MM3 (ref 140–450)
PMV BLD AUTO: 10.7 FL (ref 6–12)
POTASSIUM BLD-SCNC: 3.9 MMOL/L (ref 3.5–5.2)
RBC # BLD AUTO: 3.28 10*6/MM3 (ref 3.77–5.28)
SODIUM BLD-SCNC: 138 MMOL/L (ref 136–145)
WBC NRBC COR # BLD: 7.21 10*3/MM3 (ref 3.4–10.8)

## 2020-04-14 PROCEDURE — 80048 BASIC METABOLIC PNL TOTAL CA: CPT | Performed by: SURGERY

## 2020-04-14 PROCEDURE — 85025 COMPLETE CBC W/AUTO DIFF WBC: CPT | Performed by: SURGERY

## 2020-04-14 PROCEDURE — 25010000002 ONDANSETRON PER 1 MG: Performed by: SURGERY

## 2020-04-14 PROCEDURE — 82962 GLUCOSE BLOOD TEST: CPT

## 2020-04-14 PROCEDURE — 63710000001 INSULIN LISPRO (HUMAN) PER 5 UNITS: Performed by: SURGERY

## 2020-04-14 PROCEDURE — 25010000002 MORPHINE PER 10 MG: Performed by: SURGERY

## 2020-04-14 RX ORDER — NICOTINE POLACRILEX 4 MG
15 LOZENGE BUCCAL
Status: DISCONTINUED | OUTPATIENT
Start: 2020-04-14 | End: 2020-04-16 | Stop reason: HOSPADM

## 2020-04-14 RX ORDER — DEXTROSE MONOHYDRATE 25 G/50ML
25 INJECTION, SOLUTION INTRAVENOUS
Status: DISCONTINUED | OUTPATIENT
Start: 2020-04-14 | End: 2020-04-16 | Stop reason: HOSPADM

## 2020-04-14 RX ORDER — ACETAMINOPHEN 325 MG/1
650 TABLET ORAL EVERY 6 HOURS PRN
Status: DISCONTINUED | OUTPATIENT
Start: 2020-04-14 | End: 2020-04-16 | Stop reason: HOSPADM

## 2020-04-14 RX ADMIN — TRAMADOL HYDROCHLORIDE 50 MG: 50 TABLET, FILM COATED ORAL at 12:08

## 2020-04-14 RX ADMIN — MORPHINE SULFATE 1 MG: 2 INJECTION, SOLUTION INTRAMUSCULAR; INTRAVENOUS at 02:39

## 2020-04-14 RX ADMIN — CLOPIDOGREL 75 MG: 75 TABLET, FILM COATED ORAL at 08:42

## 2020-04-14 RX ADMIN — LINAGLIPTIN 5 MG: 5 TABLET, FILM COATED ORAL at 08:42

## 2020-04-14 RX ADMIN — TRAZODONE HYDROCHLORIDE 50 MG: 50 TABLET ORAL at 20:39

## 2020-04-14 RX ADMIN — ATORVASTATIN CALCIUM 10 MG: 10 TABLET, FILM COATED ORAL at 08:42

## 2020-04-14 RX ADMIN — INSULIN LISPRO 2 UNITS: 100 INJECTION, SOLUTION INTRAVENOUS; SUBCUTANEOUS at 12:08

## 2020-04-14 RX ADMIN — DULOXETINE HYDROCHLORIDE 60 MG: 60 CAPSULE, DELAYED RELEASE ORAL at 08:42

## 2020-04-14 RX ADMIN — ONDANSETRON HYDROCHLORIDE 4 MG: 2 SOLUTION INTRAMUSCULAR; INTRAVENOUS at 02:40

## 2020-04-14 RX ADMIN — LEVOTHYROXINE SODIUM 50 MCG: 50 TABLET ORAL at 06:13

## 2020-04-14 RX ADMIN — TRAMADOL HYDROCHLORIDE 50 MG: 50 TABLET, FILM COATED ORAL at 06:13

## 2020-04-14 RX ADMIN — BUSPIRONE HYDROCHLORIDE 10 MG: 10 TABLET ORAL at 08:42

## 2020-04-14 RX ADMIN — GABAPENTIN 400 MG: 400 CAPSULE ORAL at 08:42

## 2020-04-14 RX ADMIN — LOSARTAN POTASSIUM: 50 TABLET, FILM COATED ORAL at 08:42

## 2020-04-14 RX ADMIN — INSULIN LISPRO 3 UNITS: 100 INJECTION, SOLUTION INTRAVENOUS; SUBCUTANEOUS at 08:42

## 2020-04-14 RX ADMIN — SODIUM CHLORIDE 100 ML/HR: 9 INJECTION, SOLUTION INTRAVENOUS at 21:51

## 2020-04-14 RX ADMIN — TRAMADOL HYDROCHLORIDE 50 MG: 50 TABLET, FILM COATED ORAL at 20:39

## 2020-04-14 RX ADMIN — METFORMIN HYDROCHLORIDE 500 MG: 500 TABLET ORAL at 08:42

## 2020-04-14 RX ADMIN — METFORMIN HYDROCHLORIDE 500 MG: 500 TABLET ORAL at 17:10

## 2020-04-14 NOTE — PLAN OF CARE
Problem: Patient Care Overview  Goal: Plan of Care Review  Outcome: Ongoing (interventions implemented as appropriate)  Note:   Post op night one of fem tib bypass. VSS. AO x 4. NSR. Pulse palpable in left foot. Speaks no english and the Ipad is used for translation. Bedside catheter in place and to be removed this morning. C/o of pain treated with PRN PO and IV pain meds. C/o of nausea treated with ordered zofran. [facility]   Goal: Individualization and Mutuality  Outcome: Ongoing (interventions implemented as appropriate)  Goal: Discharge Needs Assessment  Outcome: Ongoing (interventions implemented as appropriate)  Goal: Interprofessional Rounds/Family Conf  Outcome: Ongoing (interventions implemented as appropriate)     Problem: Fall Risk (Adult)  Goal: Identify Related Risk Factors and Signs and Symptoms  Outcome: Ongoing (interventions implemented as appropriate)  Goal: Absence of Fall  Outcome: Ongoing (interventions implemented as appropriate)     Problem: Revascularization/PAD, Lower Extremity (Adult)  Goal: Signs and Symptoms of Listed Potential Problems Will be Absent, Minimized or Managed (Revascularization/PAD, Lower Extremity)  Outcome: Ongoing (interventions implemented as appropriate)  Goal: Anesthesia/Sedation Recovery  Outcome: Ongoing (interventions implemented as appropriate)     Problem: Skin Injury Risk (Adult)  Goal: Identify Related Risk Factors and Signs and Symptoms  Outcome: Ongoing (interventions implemented as appropriate)  Goal: Skin Health and Integrity  Outcome: Ongoing (interventions implemented as appropriate)

## 2020-04-14 NOTE — PROGRESS NOTES
Name: Gwendolyn Mcwilliams ADMIT: 2020   : 1945  PCP: Matthew Marquis MD    MRN: [id number] LOS: 1 days   AGE/SEX: 75 y.o. female  ROOM:  [place] [address]     CC: Postop day 1 status post fem-tib bypass with cryopreserved vein  Interval History: Somewhat difficult to speak to even with a .  Has to be refocused a lot.  Mild complaints of pain  Subjective   Review of Systems  Objective     Vital Signs  Temp:  [97.3 °F (36.3 °C)-98.8 °F (37.1 °C)] 98.8 °F (37.1 °C)  Heart Rate:  [88-98] 96  Resp:  [16-18] 16  BP: (121-214)/() 170/92    No intake/output data recorded.    Physical Exam  Vascular: Dressings are dry.  Foot is warm and well-perfused.    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      Assessment/Plan   Billin-24, Subsequent Hospital Care, E11.9    Impression/Plan:  Postop day 1 status post fem-tib bypass with cryopreserved vein: Overall doing well.  We will plan to remove dressings tomorrow.  We will give her Hibiclens bath at that time.  Potentially discharge on postop day #3.    Diabetes: Patient has had elevated blood sugars since surgery.  Serum glucose measuring 188 and 214.  Will add sliding scale insulin and Accu-Cheks.    Tommy Hernández  MD Tab  04/14/20  07:52  Office Number [phone]

## 2020-04-14 NOTE — PLAN OF CARE
Problem: Patient Care Overview  Goal: Plan of Care Review  Outcome: Ongoing (interventions implemented as appropriate)  Flowsheets (Taken 4/14/2020 1155)  Progress: no change  Plan of Care Reviewed With: patient  Outcome Summary: VSS, pt speaks no english, doesn't like using / cannot always understand her. Used  iPad multiple times throughout shift but patient still doesn't understand what RN is saying. Pt unable to to get up to chair, says she's in too much pain. Educated on importance of getting up. Pt unable to void after clement removed, straight cathed and got 1000 out. Will continue to monitor.

## 2020-04-14 NOTE — PROGRESS NOTES
Discharge Planning Assessment  [facility]     Patient Name: Gwendolyn Mcwilliams  MRN: [id number]  Today's Date: 4/14/2020    Admit Date: 4/13/2020    Discharge Needs Assessment     Row Name 04/14/20 1025       Living Environment    Lives With  child(pedro), adult    Current Living Arrangements  home/apartment/condo    Potentially Unsafe Housing Conditions  other (see comments) no concerns     Primary Care Provided by  self    Provides Primary Care For  no one    Family Caregiver if Needed  child(pedro), adult    Quality of Family Relationships  helpful;involved;supportive    Able to Return to Prior Arrangements  yes       Resource/Environmental Concerns    Resource/Environmental Concerns  none    Transportation Concerns  car, none       Transition Planning    Patient/Family Anticipates Transition to  home    Patient/Family Anticipated Services at Transition  home health care;skilled nursing    Transportation Anticipated  family or friend will provide       Discharge Needs Assessment    Readmission Within the Last 30 Days  no previous admission in last 30 days    Concerns to be Addressed  no discharge needs identified;denies needs/concerns at this time    Equipment Currently Used at Home  walker, rolling;cane, straight    Anticipated Changes Related to Illness  none    Equipment Needed After Discharge  none    Outpatient/Agency/Support Group Needs  homecare agency;skilled nursing facility    Discharge Facility/Level of Care Needs  home with home health;nursing facility, skilled        Discharge Plan     Row Name 04/14/20 1026       Plan    Plan  Home with [facility]  vs. SNF     Patient/Family in Agreement with Plan  yes    Plan Comments  CCP met with patient at bedside and used  line (Guillaume [id number]). CCP role explained and discharge planning discussed. Patient had difficulty communicating with . Patient gave CCP permission to speak with her son, Joe. No contacts listed on face sheet with  son's name Joe.  CCP made outbound call to Gabriela Means (sister in law)  [phone]. Sister in law states she is the only one that speaks English and her son speaks Polish as well. CCP verified face sheet and IMM noted. Patient's PCP is Dr. Marquis. Patient lives with her son and daughter in law, in a first floor apartment. Patient uses a walker and cane for mobility. Patient has prosthetic as well that she uses. Patient's sister in law states her PCP was supposed to be setting up a shower chair for them but they never received one. CCP discussed a 3 in 1 commode; patient's sister in law states they need one. Patient has in home housekeepers that come in Monday-Friday. Patient has used home health in the past but could not recall which agency. Patient's sister in law denies any SNF history. Patient's sister in law feels that patient does need physical therapy. CCP discussed home health and reviewed home health ratings on [employer]. Patient's sister in law requested [facility]. Patient's sister in law will talk with her family regarding SNF to see if it is needed. CCP will need to follow for PT eval and discuss recommendations with family. Megan Deleon CSW         Destination      Coordination has not been started for this encounter.      Durable Medical Equipment      Coordination has not been started for this encounter.      Dialysis/Infusion      Coordination has not been started for this encounter.      Home Medical Care      Coordination has not been started for this encounter.      Therapy      Coordination has not been started for this encounter.      Community Resources      Coordination has not been started for this encounter.          Demographic Summary     Row Name 04/14/20 1025       General Information    Admission Type  inpatient    Arrived From  emergency department    Required Notices Provided  Important Message from Medicare    Referral Source  admission list    Reason for Consult  discharge  planning    Preferred Language  Setswana     Used During This Interaction  yes    General Information Comments  Guillaume [id number]         Functional Status     Row Name 04/14/20 1025       Functional Status    Usual Activity Tolerance  good    Current Activity Tolerance  moderate       Functional Status, IADL    Medications  assistive equipment    Meal Preparation  assistive equipment    Housekeeping  assistive equipment    Laundry  assistive equipment    Shopping  assistive equipment       Mental Status Summary    Recent Changes in Mental Status/Cognitive Functioning  no changes        Psychosocial    No documentation.       Abuse/Neglect    No documentation.       Legal    No documentation.       Substance Abuse    No documentation.       Patient Forms    No documentation.           EARLINE Carroll

## 2020-04-14 NOTE — DISCHARGE PLACEMENT REQUEST
"Stoney Simon (75 y.o. Female)     Date of Birth Social Security Number Address Home Phone MRN    1945  [address] [place] [address]  [place] [address] [phone] [id number]    [employer] Status          Oriental orthodox        Admission Date Admission Type Admitting Provider Attending Provider Department, Room/Bed    4/13/20 Elective Tanisha Romero Jr., MD George, Salem Michael Jr., MD [address], [address]    Discharge Date Discharge Disposition Discharge Destination                       Attending Provider:  Tanisha Romero Jr., MD    Allergies:  Aspirin    Isolation:  None   Infection:  None   Code Status:  CPR    Ht:  157.5 cm (62\")   Wt:  44.5 kg (98 lb 1.7 oz)    Admission Cmt:  None   Principal Problem:  None                Active Insurance as of 4/13/2020     Primary Coverage     Payor Plan Insurance Group Employer/Plan Group    HUMANA MEDICARE REPLACEMENT HUMANA MEDICARE REPLACEMENT [id number]     Payor Plan Address Payor Plan Phone Number Payor Plan Fax Number Effective Dates    [address] [address] [phone]  5/1/2019 - None Entered    [facility] [address]       Subscriber Name Subscriber Birth Date Member ID       STONEY SIMON 1945 [id number]           Secondary Coverage     Payor Plan Insurance Group Employer/Plan Group    [place]      Payor Plan Address Payor Plan Phone Number Payor Plan Fax Number Effective Dates    [address] [address]   10/22/2019 - None Entered    [place] [address]       Subscriber Name Subscriber Birth Date Member ID       STONEY SIMON 1945 [id number]                 Emergency Contacts      (Rel.) Home Phone Work Phone Mobile Phone    STACEY WALTON (Relative) -- -- [phone]    AGUSTO REYNOLDS (Son) [phone] -- [phone]    Jack Santiago (Relative) -- -- [phone]    Manny Owen (Relative) -- -- [phone]    "

## 2020-04-15 LAB
GLUCOSE BLDC GLUCOMTR-MCNC: 121 MG/DL (ref 70–130)
GLUCOSE BLDC GLUCOMTR-MCNC: 121 MG/DL (ref 70–130)
GLUCOSE BLDC GLUCOMTR-MCNC: 127 MG/DL (ref 70–130)
GLUCOSE BLDC GLUCOMTR-MCNC: 129 MG/DL (ref 70–130)

## 2020-04-15 PROCEDURE — 97110 THERAPEUTIC EXERCISES: CPT

## 2020-04-15 PROCEDURE — 97162 PT EVAL MOD COMPLEX 30 MIN: CPT

## 2020-04-15 PROCEDURE — 82962 GLUCOSE BLOOD TEST: CPT

## 2020-04-15 RX ADMIN — TRAMADOL HYDROCHLORIDE 50 MG: 50 TABLET, FILM COATED ORAL at 03:22

## 2020-04-15 RX ADMIN — ACETAMINOPHEN 650 MG: 325 TABLET, FILM COATED ORAL at 00:09

## 2020-04-15 RX ADMIN — BUSPIRONE HYDROCHLORIDE 10 MG: 10 TABLET ORAL at 08:19

## 2020-04-15 RX ADMIN — DULOXETINE HYDROCHLORIDE 60 MG: 60 CAPSULE, DELAYED RELEASE ORAL at 08:19

## 2020-04-15 RX ADMIN — TRAZODONE HYDROCHLORIDE 50 MG: 50 TABLET ORAL at 22:30

## 2020-04-15 RX ADMIN — LINAGLIPTIN 5 MG: 5 TABLET, FILM COATED ORAL at 08:19

## 2020-04-15 RX ADMIN — CLOPIDOGREL 75 MG: 75 TABLET, FILM COATED ORAL at 08:19

## 2020-04-15 RX ADMIN — METFORMIN HYDROCHLORIDE 500 MG: 500 TABLET ORAL at 08:19

## 2020-04-15 RX ADMIN — LEVOTHYROXINE SODIUM 50 MCG: 50 TABLET ORAL at 06:03

## 2020-04-15 RX ADMIN — GABAPENTIN 400 MG: 400 CAPSULE ORAL at 08:19

## 2020-04-15 RX ADMIN — ATORVASTATIN CALCIUM 10 MG: 10 TABLET, FILM COATED ORAL at 08:19

## 2020-04-15 RX ADMIN — METFORMIN HYDROCHLORIDE 500 MG: 500 TABLET ORAL at 17:11

## 2020-04-15 RX ADMIN — TRAMADOL HYDROCHLORIDE 50 MG: 50 TABLET, FILM COATED ORAL at 22:38

## 2020-04-15 RX ADMIN — LOSARTAN POTASSIUM: 50 TABLET, FILM COATED ORAL at 08:19

## 2020-04-15 NOTE — PROGRESS NOTES
Continued Stay Note  [facility]     Patient Name: Gwendolyn Mcwilliams  MRN: [id number]  Today's Date: 4/15/2020    Admit Date: 4/13/2020    Discharge Plan     Row Name 04/15/20 1118       Plan    Plan  Home with Emily ALVAREZ     Patient/Family in Agreement with Plan  yes    Plan Comments  CCP made outbound call to patient's sister in law, Gabriela  [phone]; patient's sister in law states they do not want SNF due to COVID-19 and plans for her to return home. Patient's sister in law requests home health and states they will have the  come in during the week to assist her. Family to provide transprotation at discharge. CCP will follow for discharge home with Emily ALVAREZ. Megan PATTEN         Discharge Codes    No documentation.             EARLINE Carroll

## 2020-04-15 NOTE — PROGRESS NOTES
Name: Gwendolyn Mcwilliams ADMIT: 2020   : 1945  PCP: Matthew Marquis MD    MRN: [id number] LOS: 2 days   AGE/SEX: 75 y.o. female  ROOM:  [place] [address]     CC: Postop day 2 status post fem-tib bypass with cryopreserved vein  Interval History: Continues to be somewhat difficult to speak to even with a .    Subjective   Review of Systems  Objective     Vital Signs  Temp:  [97.3 °F (36.3 °C)-100.3 °F (37.9 °C)] 97.3 °F (36.3 °C)  Heart Rate:  [] 84  Resp:  [16] 16  BP: (118-176)/(67-81) 118/67    I/O this shift:  In: 50 [P.O.:50]  Out: -     Physical Exam  Vascular: Dressings are dry.  Foot is warm and well-perfused. Good signals, palpable pulse    Data Reviewed:  CBC    Results from last 7 days   Lab Units 20  0343 20  0650   WBC 10*3/mm3 7.21 5.29   HEMOGLOBIN g/dL 9.8* 11.2*   PLATELETS 10*3/mm3 183 220     BMP   Results from last 7 days   Lab Units 20  0343 20  0650   SODIUM mmol/L 138 137   POTASSIUM mmol/L 3.9 4.1   CHLORIDE mmol/L 102 98   CO2 mmol/L 23.4 26.1   BUN mg/dL 8 11   CREATININE mg/dL 0.74 0.75   GLUCOSE mg/dL 214* 188*     Blood Glucose   Glucose   Date/Time Value Ref Range Status   04/15/2020 0637 127 70 - 130 mg/dL Final   2020 2051 124 70 - 130 mg/dL Final   2020 1610 102 70 - 130 mg/dL Final   2020 1158 153 (H) 70 - 130 mg/dL Final   2020 1049 229 (H) 70 - 130 mg/dL Final     Imaging Reviewed: None    Active Hospital Problems    Diagnosis  POA   • Diabetes mellitus (CMS/HCC) [E11.9]  Yes   • 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      Assessment/Plan   Billin, Post Op Global    Impression/Plan:  Postop day2 status post fem-tib bypass with cryopreserved vein: Overall doing well.  We will plan to remove dressings  today.  We will give her Hibiclens bath at that time.  Potentially discharge tomorrow    Diabetes: Patient has had elevated blood sugars since surgery.  Serum glucose measuring 188 and 214 before adding sliding scale insulin and Accu-Cheks. Much better now.    Urinary retention: If does not resolve today may need to get urology to see.    Home tomorrow?    Tommy Barth MD  04/15/20  11:03  Office Number [phone]

## 2020-04-15 NOTE — THERAPY EVALUATION
Patient Name: Gwendolyn Mcwilliams  : 1945    MRN: [id number]                              Today's Date: 4/15/2020       Admit Date: 2020    Visit Dx:     ICD-10-CM ICD-9-CM   1. 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 (CMS/HCC)   • PVD (peripheral vascular disease) with claudication (CMS/HCC)   • Diabetes mellitus (CMS/HCC)     Past Medical History:   Diagnosis Date   • Anxiety and depression    • Diabetes mellitus (CMS/HCC)     TYPE 2   • GERD (gastroesophageal reflux disease)    • Hx of right BKA (CMS/[facility])     USES PROSTHESIS   • Hyperlipidemia     PREVENTATIVE   • Hypertension    • Hypothyroidism    • Insomnia    • Nausea     DOESNT DIGEST FOOD WELL   • Osteoporosis     BACK AND HIPS   • Peripheral vascular disease (CMS/HCC)    • Poor circulation of extremity    • Post-menopausal    • Swallowing difficulty      Past Surgical History:   Procedure Laterality Date   • AMPUTATION      RBKA   • ANGIOPLASTY FEMORAL ARTERY Left 2019    Procedure: AORTIC ILIAC FEMORAL LEFT LOWER EXTREMITY RUNOFF LEFT SUPERFICIAL FEMORAL ARTERY/POP PERCONTANEOUS TRANSLUMINAL ANGIOPLASTY;  Surgeon: Tanisha Romero Jr., MD;  Location: [facility] OR ;  Service: Vascular   • ANGIOPLASTY FEMORAL ARTERY Left 2020    Procedure: AORTO ILIAC FEMORAL WITH LEFT SUPERFICIAL FEMORAL ARTERY AND LEFT COMMON ILIAC PERCUTANEOUS TRANSLUMINAL ANGIOPLASTY STENT;  Surgeon: Tanisha Romero Jr., MD;  Location: [facility] OR ;  Service: Vascular;  Laterality: Left;   • CATARACT EXTRACTION, BILATERAL     • EYE SURGERY     • FEMORAL TIBIAL BYPASS Left 2020    Procedure: LEFT FEMORAL TIBIAL BYPASS USING TRANSLOCATED GREATER SAPHENOUS VEIN;  Surgeon: Tanisha Romero Jr., MD;  Location: [facility];  Service: Vascular;  Laterality: Left;   • VASCULAR SURGERY       IN THE PAST JARROD RICO     General Information     Row Name 04/15/20 1518          PT Evaluation Time/Intention    Document Type  evaluation  -PC     Mode of Treatment  physical therapy  -PC     Row Name 04/15/20 1518          General Information    Patient Profile Reviewed?  yes  -PC     Prior Level of Function  independent:;all household mobility with walker and prosthesis  -PC     Existing Precautions/Restrictions  fall  -PC     Barriers to Rehab  medically complex  -PC     Row Name 04/15/20 1518          Relationship/Environment    Lives With  child(pedro), adult  -PC     Row Name 04/15/20 1518          Resource/Environmental Concerns    Current Living Arrangements  home/apartment/condo  -PC     Row Name 04/15/20 1518          Cognitive Assessment/Intervention- PT/OT    Cognitive Assessment/Intervention Comment  pt does not speak english, nursing assistant present in room to translate, pt is pleasant and cooperative  -PC     Row Name 04/15/20 1518          Safety Issues, Functional Mobility    Impairments Affecting Function (Mobility)  strength;endurance/activity tolerance;pain  -PC       User Key  (r) = Recorded By, (t) = Taken By, (c) = Cosigned By    Initials Name Provider Type    PC Zeinab Faye, PT Physical Therapist        Mobility     Row Name 04/15/20 1520          Bed Mobility Assessment/Treatment    Comment (Bed Mobility)  pt up in chair, prosthesis donned while in chair  -PC     Row Name 04/15/20 1520          Sit-Stand Transfer    Sit-Stand [place] (Transfers)  minimum assist (75% patient effort)  -     Assistive Device (Sit-Stand Transfers)  walker, front-wheeled  -PC     Row Name 04/15/20 1520          Gait/Stairs Assessment/Training    Gait/Stairs Assessment/Training  gait/ambulation independence;gait/ambulation assistive device  -PC     [place] Level (Gait)  minimum assist (75% patient effort);contact guard  -PC     Assistive Device (Gait)  walker, front-wheeled  -PC      Distance in Feet (Gait)  20 ft  -PC     Pattern (Gait)  step-to  -PC     Deviations/Abnormal Patterns (Gait)  antalgic  -PC     Bilateral Gait Deviations  forward flexed posture;heel strike decreased  -PC       User Key  (r) = Recorded By, (t) = Taken By, (c) = Cosigned By    Initials Name Provider Type    PC Zeinab Faye, PT Physical Therapist        Obj/Interventions     Row Name 04/15/20 1521          General ROM    GENERAL ROM COMMENTS  WFL  -PC     Row Name 04/15/20 1521          MMT (Manual Muscle Testing)    General MMT Comments  B UE 3+/5, R LE hip and knee 3+/5, LLE 3/5 painful  -PC     Row Name 04/15/20 1521          Therapeutic Exercise    Comment (Therapeutic Exercise)  LLE AP  -PC     Row Name 04/15/20 1521          Static Standing Balance    Level of [place] (Supported Standing, Static Balance)  contact guard assist  -PC     Assistive Device Utilized (Supported Standing, Static Balance)  walker, rolling  -PC     Row Name 04/15/20 1521          Dynamic Standing Balance    Level of [place], Reaches Outside Midline (Standing, Dynamic Balance)  minimal assist, 75% patient effort;contact guard assist  -PC     Assistive Device Utilized (Supported Standing, Dynamic Balance)  walker, rolling  -PC       User Key  (r) = Recorded By, (t) = Taken By, (c) = Cosigned By    Initials Name Provider Type    PC Zeinab Faye PT Physical Therapist        Goals/Plan     Row Name 04/15/20 1525          Bed Mobility Goal 1 (PT)    Activity/Assistive Device (Bed Mobility Goal 1, PT)  sit to supine/supine to sit  -PC     [place] Level/Cues Needed (Bed Mobility Goal 1, PT)  contact guard assist  -PC     Time Frame (Bed Mobility Goal 1, PT)  1 week  -PC     Row Name 04/15/20 1525          Transfer Goal 1 (PT)    Activity/Assistive Device (Transfer Goal 1, PT)  sit-to-stand/stand-to-sit  -PC     [place] Level/Cues Needed (Transfer Goal 1, PT)  contact guard assist  -PC     Time Frame (Transfer Goal 1,  PT)  1 week  -PC     Row Name 04/15/20 1525          Gait Training Goal 1 (PT)    Activity/Assistive Device (Gait Training Goal 1, PT)  gait (walking locomotion);assistive device use  -PC     [place] Level (Gait Training Goal 1, PT)  contact guard assist  -PC     Distance (Gait Goal 1, PT)  40 ft  -PC     Time Frame (Gait Training Goal 1, PT)  1 week  -PC       User Key  (r) = Recorded By, (t) = Taken By, (c) = Cosigned By    Initials Name Provider Type    PC Zeinab Faye, PT Physical Therapist        Clinical Impression     Row Name 04/15/20 1522          Pain Assessment    Additional Documentation  Pain Scale: Numbers Pre/Post-Treatment (Group)  -PC     Row Name 04/15/20 1522          Pain Scale: Numbers Pre/Post-Treatment    Pre/Post Treatment Pain Comment  did not rate, but appears antalgic to bear weight through LLE  -PC     Pain Intervention(s)  Medication (See MAR);Repositioned  -PC     Row Name 04/15/20 1522          Plan of Care Review    Plan of Care Reviewed With  patient  -PC     Outcome Summary  pt presents POD 1 LLE bypass, has hx of R BKA, uses a prosthesis at baseline and walker, has w/c also and lives with children, pt did well this date with prosthesis on, she was able to walk with rwx 20 ft, antalgic LLE with mild unsteadiness, plans home at discharge  -     Row Name 04/15/20 1522          Physical Therapy Clinical Impression    Criteria for Skilled Interventions Met (PT Clinical Impression)  yes;treatment indicated  -PC     Rehab Potential (PT Clinical Summary)  good, to achieve stated therapy goals  -     Row Name 04/15/20 1522          Positioning and Restraints    Pre-Treatment Position  sitting in chair/recliner  -PC     Post Treatment Position  chair  -PC     In Chair  reclined;call light within reach;encouraged to call for assist;exit alarm on  -PC       User Key  (r) = Recorded By, (t) = Taken By, (c) = Cosigned By    Initials Name Provider Type    PC Zeinab Fyae, PT  Physical Therapist        Outcome Measures     Row Name 04/15/20 1526          How much help from another person do you currently need...    Turning from your back to your side while in flat bed without using bedrails?  3  -PC     Moving from lying on back to sitting on the side of a flat bed without bedrails?  3  -PC     Moving to and from a bed to a chair (including a wheelchair)?  3  -PC     Standing up from a chair using your arms (e.g., wheelchair, bedside chair)?  3  -PC     Climbing 3-5 steps with a railing?  1  -PC     To walk in hospital room?  3  -PC     AM-PAC 6 Clicks Score (PT)  16  -PC     Row Name 04/15/20 1526          Functional Assessment    Outcome Measure Options  AM-PAC 6 Clicks Basic Mobility (PT)  -PC       User Key  (r) = Recorded By, (t) = Taken By, (c) = Cosigned By    Initials Name Provider Type    PC Zeinab Faye, PT Physical Therapist          PT Recommendation and Plan  Planned Therapy Interventions (PT Eval): balance training, bed mobility training, gait training, home exercise program, stair training, transfer training  Outcome Summary/Treatment Plan (PT)  Anticipated Discharge Disposition (PT): home with assist  Plan of Care Reviewed With: patient  Outcome Summary: pt presents POD 1 LLE bypass, has hx of R BKA, uses a prosthesis at baseline and walker, has w/c also and lives with children, pt did well this date with prosthesis on, she was able to walk with rwx 20 ft, antalgic LLE with mild unsteadiness, plans home at discharge     Time Calculation:   PT Charges     Row Name 04/15/20 1531             Time Calculation    Start Time  1455  -PC      Stop Time  1515  -PC      Time Calculation (min)  20 min  -PC      PT Received On  04/15/20  -PC      PT - Next Appointment  04/17/20  -PC      PT Goal Re-Cert Due Date  04/22/20  -PC        User Key  (r) = Recorded By, (t) = Taken By, (c) = Cosigned By    Initials Name Provider Type    Zeinab Womack, PT Physical Therapist         Therapy Charges for Today     Code Description Service Date Service Provider Modifiers Qty    47865622535 HC PT EVAL MOD COMPLEXITY 2 4/15/2020 Zeinab Faye, PT GP 1    05459364821 HC PT THER PROC EA 15 MIN 4/15/2020 Zeinab Faye, PT GP 1    05839151964 HC PT THER SUPP EA 15 MIN 4/15/2020 Zeinab Faye, PT GP 1          PT G-Codes  Outcome Measure Options: AM-PAC 6 Clicks Basic Mobility (PT)  AM-PAC 6 Clicks Score (PT): 16    Zeinab Faye, PT  4/15/2020

## 2020-04-15 NOTE — PROGRESS NOTES
Continued Stay Note  [facility]     Patient Name: Gwendolyn Mcwilliams  MRN: [id number]  Today's Date: 4/15/2020    Admit Date: 4/13/2020    Discharge Plan     Row Name 04/15/20 1127       Plan    Plan  Home with [facility] and 3 in 1 commode     Patient/Family in Agreement with Plan  yes    Plan Comments  CCP spoke with patient's sister in law; states they do need 3 in 1 commode. CCP notified [facility]; will need DME order. Family may have to  3 in 1 commode [facility] tomorrow if it can not be delivered to the hospital today. Megan PATTEN     Row Name 04/15/20 1118       Plan    Plan  Home with Emily      Patient/Family in Agreement with Plan  yes    Plan Comments  CCP made outbound call to patient's sister in law, Gabriela  [phone]; patient's sister in law states they do not want SNF due to COVID-19 and plans for her to return home. Patient's sister in law requests home health and states they will have the  come in during the week to assist her. Family to provide transprotation at discharge. CCP will follow for discharge home with Emily ALVAREZ. Megan PATTEN         Discharge Codes    No documentation.             EARLINE Carroll

## 2020-04-15 NOTE — PLAN OF CARE
Problem: Patient Care Overview  Goal: Plan of Care Review  Outcome: Ongoing (interventions implemented as appropriate)  Flowsheets (Taken 4/15/2020 0521)  Progress: no change  Plan of Care Reviewed With: patient  Outcome Summary: POD 1 from left lower extremity bypass. Pulses per doppler. Speaks only Romanian. Urinary retention, straight cath'd x 1 thus far 1200 cc returned. Medicated x 2 for pain. Temp max 100.4, Tylenol given, afebrile now. Pt still with quite a bit of incisional pain. Pt would not try to get OOB and would hold on to bedrails to keep staff from getting her up. Will continue to monitor.  Goal: Individualization and Mutuality  Outcome: Ongoing (interventions implemented as appropriate)  Goal: Discharge Needs Assessment  Outcome: Ongoing (interventions implemented as appropriate)  Goal: Interprofessional Rounds/Family Conf  Outcome: Ongoing (interventions implemented as appropriate)     Problem: Fall Risk (Adult)  Goal: Identify Related Risk Factors and Signs and Symptoms  Outcome: Ongoing (interventions implemented as appropriate)  Goal: Absence of Fall  Outcome: Ongoing (interventions implemented as appropriate)  Flowsheets (Taken 4/15/2020 0521)  Absence of Fall: making progress toward outcome     Problem: Revascularization/PAD, Lower Extremity (Adult)  Goal: Signs and Symptoms of Listed Potential Problems Will be Absent, Minimized or Managed (Revascularization/PAD, Lower Extremity)  Outcome: Ongoing (interventions implemented as appropriate)  Flowsheets (Taken 4/15/2020 0521)  Problems Assessed (Lower Extremity Revascularization/PAD): all  Problems Present (LE Revascular/PAD): pain  Goal: Anesthesia/Sedation Recovery  Outcome: Ongoing (interventions implemented as appropriate)     Problem: Skin Injury Risk (Adult)  Goal: Identify Related Risk Factors and Signs and Symptoms  Outcome: Ongoing (interventions implemented as appropriate)  Goal: Skin Health and Integrity  Outcome: Ongoing  (interventions implemented as appropriate)  Flowsheets (Taken 4/15/2020 0521)  Skin Health and Integrity: making progress toward outcome

## 2020-04-15 NOTE — PLAN OF CARE
Problem: Patient Care Overview  Goal: Plan of Care Review  Outcome: Ongoing (interventions implemented as appropriate)  Flowsheets (Taken 4/15/2020 1621)  Progress: improving  Plan of Care Reviewed With: patient  Outcome Summary: VSS, denies pain,dressing changed on left leg and cleaned area around incision with chlorhexidine wipes per MD order, pt. was able to void when got her up to bedside commode, possible d/c home tomorrow, will continue to monitor pt.     Problem: Fall Risk (Adult)  Goal: Identify Related Risk Factors and Signs and Symptoms  Outcome: Ongoing (interventions implemented as appropriate)  Flowsheets (Taken 4/15/2020 1621)  Related Risk Factors (Fall Risk): gait/mobility problems  Signs and Symptoms (Fall Risk): presence of risk factors     Problem: Fall Risk (Adult)  Goal: Absence of Fall  Outcome: Ongoing (interventions implemented as appropriate)  Flowsheets (Taken 4/15/2020 1621)  Absence of Fall: making progress toward outcome     Problem: Revascularization/PAD, Lower Extremity (Adult)  Goal: Signs and Symptoms of Listed Potential Problems Will be Absent, Minimized or Managed (Revascularization/PAD, Lower Extremity)  Outcome: Ongoing (interventions implemented as appropriate)  Flowsheets (Taken 4/15/2020 1621)  Problems Assessed (Lower Extremity Revascularization/PAD): all  Problems Present (LE Revascular/PAD): situational response     Problem: Skin Injury Risk (Adult)  Goal: Identify Related Risk Factors and Signs and Symptoms  Outcome: Ongoing (interventions implemented as appropriate)  Flowsheets (Taken 4/15/2020 1621)  Related Risk Factors (Skin Injury Risk): mechanical forces     Problem: Skin Injury Risk (Adult)  Goal: Skin Health and Integrity  Outcome: Ongoing (interventions implemented as appropriate)  Flowsheets (Taken 4/15/2020 1621)  Skin Health and Integrity: making progress toward outcome

## 2020-04-15 NOTE — PLAN OF CARE
Problem: Patient Care Overview  Goal: Plan of Care Review  Outcome: Ongoing (interventions implemented as appropriate)  Flowsheets (Taken 4/15/2020 3522)  Outcome Summary: pt presents POD 1 LLE bypass, has hx of R BKA, uses a prosthesis at baseline and walker, has w/c also and lives with children, pt did well this date with prosthesis on, she was able to walk with rwx 20 ft, antalgic LLE with mild unsteadiness, plans home at discharge

## 2020-04-16 VITALS
HEART RATE: 99 BPM | OXYGEN SATURATION: 100 % | TEMPERATURE: 97.8 F | DIASTOLIC BLOOD PRESSURE: 84 MMHG | RESPIRATION RATE: 18 BRPM | HEIGHT: 62 IN | WEIGHT: 98.11 LBS | SYSTOLIC BLOOD PRESSURE: 167 MMHG | BODY MASS INDEX: 18.05 KG/M2

## 2020-04-16 LAB
GLUCOSE BLDC GLUCOMTR-MCNC: 80 MG/DL (ref 70–130)
GLUCOSE BLDC GLUCOMTR-MCNC: 87 MG/DL (ref 70–130)

## 2020-04-16 PROCEDURE — 25010000002 ONDANSETRON PER 1 MG: Performed by: SURGERY

## 2020-04-16 PROCEDURE — 82962 GLUCOSE BLOOD TEST: CPT

## 2020-04-16 RX ORDER — ONDANSETRON 4 MG/1
4 TABLET, FILM COATED ORAL EVERY 6 HOURS PRN
Qty: 30 TABLET | Refills: 0 | Status: ON HOLD | OUTPATIENT
Start: 2020-04-16 | End: 2020-11-24

## 2020-04-16 RX ADMIN — ONDANSETRON HYDROCHLORIDE 4 MG: 2 SOLUTION INTRAMUSCULAR; INTRAVENOUS at 09:09

## 2020-04-16 RX ADMIN — LEVOTHYROXINE SODIUM 50 MCG: 50 TABLET ORAL at 05:28

## 2020-04-16 NOTE — PLAN OF CARE
Dressings to left leg incision at places of drainage CDI.  Gave Tramadol once when pt grimaced when touching incision area.  Pt states she does not have to void.  Assisted to BSC to see if this would help urge to go.  Pt unable to void.  Bladder scanned 761. Straight cathed pt and got 800cc urine out.  Notes indicate possible consult urology r/t urinary retention. Plan was if no further voiding issues discharge today.

## 2020-04-16 NOTE — PROGRESS NOTES
Name: Gwendolyn Mcwilliams ADMIT: 2020   : 1945  PCP: Matthew Marquis MD    MRN: [id number] LOS: 3 days   AGE/SEX: 75 y.o. female  ROOM:  [place] [address]     CC: Postop day  status post fem-tib bypass with cryopreserved vein  Interval History: Still struggling with some urinary retention.  Was straight cathed at midnight last night.  However only 178 in the bladder today.  She is a bit nauseated.  Improved with Zofran  Subjective   Review of Systems  Objective     Vital Signs  Temp:  [97.4 °F (36.3 °C)-99.8 °F (37.7 °C)] 99.5 °F (37.5 °C)  Heart Rate:  [84-98] 94  Resp:  [16] 16  BP: (136-148)/(66-78) 146/76    No intake/output data recorded.    Physical Exam  Vascular: Dressings are dry.  Foot is warm and well-perfused. Good signals, palpable pulse    Data Reviewed:  CBC    Results from last 7 days   Lab Units 20  0343 20  0650   WBC 10*3/mm3 7.21 5.29   HEMOGLOBIN g/dL 9.8* 11.2*   PLATELETS 10*3/mm3 183 220     BMP   Results from last 7 days   Lab Units 20  0343 20  0650   SODIUM mmol/L 138 137   POTASSIUM mmol/L 3.9 4.1   CHLORIDE mmol/L 102 98   CO2 mmol/L 23.4 26.1   BUN mg/dL 8 11   CREATININE mg/dL 0.74 0.75   GLUCOSE mg/dL 214* 188*     Blood Glucose   Glucose   Date/Time Value Ref Range Status   2020 0639 87 70 - 130 mg/dL Final   04/15/2020 2102 121 70 - 130 mg/dL Final   04/15/2020 1641 121 70 - 130 mg/dL Final   04/15/2020 1121 129 70 - 130 mg/dL Final   04/15/2020 0637 127 70 - 130 mg/dL Final   2020 2051 124 70 - 130 mg/dL Final   2020 1610 102 70 - 130 mg/dL Final   2020 1158 153 (H) 70 - 130 mg/dL Final     Imaging Reviewed: None    Active Hospital Problems    Diagnosis  POA   • Diabetes mellitus (CMS/HCC) [E11.9]  Yes   • 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      Assessment/Plan   Billin, Post Op Global    Impression/Plan:  Postop day 3 status post fem-tib bypass with cryopreserved vein: I had my best conversation with her via  today.  She is having some urinary retention but does not have much on a bladder scan.  I suspect this may be a chronic issue.  Some nausea but improved with medication.  If this continues, I think be reasonable to discharge her.  Will likely need some Zofran  Diabetes: Glucose better controlled  Urinary retention:      Tommy Barth MD  20  07:23  Office Number [phone]

## 2020-04-16 NOTE — NURSING NOTE
Pt states she does not have to void.  Asked pt to try.  Assisted pt to BSC with assist of 2 (basically had to lift pt to BSC) Pt sat on BSC for some time unable to void. Assisted back to bed.  Bladder scanned 761. Straight cathed pt and got 800 cc out.

## 2020-04-16 NOTE — DISCHARGE INSTRUCTIONS
[facility]  Nick Swanson, Kady Dinh Rachel, Scherrer Thomas  [address], [address]  [phone]    Lower Extremity Bypass Surgery    Please refer to the following instructions for your post-procedure care.  Your surgeon and/or nurse will discuss any changes with you.    Activity  Avoid lifting more than five to 10 pounds (one or two gallons of milk) until after your first post-operative visit. You are encouraged to walk as much as you can. You can slowly return to normal activities during the month after your surgery. Avoid strenuous activity and heavy lifting until your doctor tells you it's OK. Avoid activities such as vacuuming, shoveling snow or swinging a golf club.    Do not drive until your doctor gives the OK and you are no longer taking prescription pain medications. It is also normal to have difficulty with sleep habits, eating, and bowel movements after surgery. These will go away with time.    Bathing/Showering  You may shower after you go home. Do not remove the bandages unless they begin to peel off. Do not soak in a bathtub, hot tub, or swim until the incision heals completely and the staples are removed.    Incision Care  Clean your incision with mild soap and water. Pat the area dry with a clean towel. You do not need a bandage unless otherwise instructed. Do not apply any ointments  or creams to your incision. If you have open wounds you will be instructed on how  to care for them or a visiting nurse will be prescribed for you. If you have staples or sutures along your incision they will be removed at your post-op appointment.    Diet  Resume your normal diet. There are no special food restrictions following this procedure. A low fat/low cholesterol diet is recommended for all patients with  vascular disease.    Medications  Resume taking all of your medications unless your doctor or nurse practitioner tells you not to. If your incision is causing pain, you may  take over-the-counter pain relievers such as acetaminophen (Tylenol®).    If you were prescribed a stronger pain medication, please be aware these medications can cause nausea and constipation. Prevent nausea by taking the medication  with a snack or meal. Avoid constipation by drinking plenty of fluids and eating foods with a high amount of fiber, such as fruits, vegetables, and grains. Do not take  Tylenol® if you are taking prescription pain medications.    It is important that you understand the medications that have been prescribed to keep your bypass open. These  medications may include aspirin, clopidogrel (PLAVIX®), or warfarin (COUMADIN®). If you are unsure of which medication (s) have been prescribed, contact the office.    Please call us immediately for any of the following conditions  • Severe or worsening pain in your legs or feet while at rest or while walking  • Increased pain, redness, warmth, or drainage (pus) from your incision site(s)  • Fever of 101 degrees or higher  • The swelling in your leg with the bypass suddenly worsens and becomes more painful than when you were in the hospital  • If you have been instructed to feel your graft pulse then you should do so every day. If you can no longer feel this pulse, call the office immediately. Not all patients are given this instruction.    Leg swelling is common after leg bypass surgery. The swelling should improve over a few months following surgery. To improve the swelling, you may elevate your legs above the level of your heart while you are sitting or resting. Your surgeon or nurse practitioner may ask you to apply an ACE wrap or wear compression (ADRIAN) stockings to help to reduce swelling.    Reduce your risk of vascular disease  · Stop smoking if you smoke!  · Manage your cholesterol  · Maintain a desired weight  · Control your diabetes  · Keep your blood pressure down

## 2020-04-16 NOTE — DISCHARGE SUMMARY
Name: Gwendolyn Mcwilliams ADMIT: 2020   : 1945  PCP: Matthew Marquis MD    MRN: [id number] LOS: 3 days   AGE/SEX: 75 y.o. 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       SECONDARY DIAGNOSES  Past Medical History:   Diagnosis Date   • Anxiety and depression    • Diabetes mellitus (CMS/HCC)     TYPE 2   • GERD (gastroesophageal reflux disease)    • Hx of right BKA (CMS/HCC)     USES PROSTHESIS   • Hyperlipidemia     PREVENTATIVE   • Hypertension    • Hypothyroidism    • Insomnia    • Nausea     DOESNT DIGEST FOOD WELL   • Osteoporosis     BACK AND HIPS   • Peripheral vascular disease (CMS/HCC)    • Poor circulation of extremity    • Post-menopausal    • Swallowing difficulty        CONSULTS   Consults     No orders found from 3/15/2020 to 2020.          PROCEDURES PERFORMED    Date: 2020, left femoral to tibial artery bypass with cryopreserved vein    HOSPITAL COURSE  Patient is a 75 y.o. female presented to [facility] admitted for severe peripheral arterial disease with tissue loss.  Please see the admitting history and physical for further details.  The patient underwent the above procedure on 2020 by Dr. Romero.  Her greater saphenous vein was explored and found to be an adequate and therefore cryopreserved vein was used.  Patient's hospitalization remained relatively uncomplicated although we did have some issues with communication despite consistent use of a .  She had some nausea that improved and also had some urinary retention.  She felt  "that she would be better served at home and was anxious to be discharged.      VITAL SIGNS  /76 (BP Location: Left arm, Patient Position: Lying)   Pulse 94   Temp 99.5 °F (37.5 °C) (Oral)   Resp 16   Ht 157.5 cm (62\")   Wt 44.5 kg (98 lb 1.7 oz)   SpO2 96%   BMI 17.94 kg/m²   Objective  CONDITION ON DISCHARGE  Stable.      DISCHARGE DISPOSITION   Home or Self Care      DISCHARGE MEDICATIONS     Discharge Medications      New Medications      Instructions Start Date   ondansetron 4 MG tablet  Commonly known as:  ZOFRAN   4 mg, Oral, Every 6 Hours PRN         Continue These Medications      Instructions Start Date   alendronate 70 MG tablet  Commonly known as:  FOSAMAX   70 mg, Oral, Every 7 Days, TUESDAYS       atorvastatin 10 MG tablet  Commonly known as:  LIPITOR   10 mg, Oral, Daily      busPIRone 10 MG tablet  Commonly known as:  BUSPAR   10 mg, Oral, Daily      clopidogrel 75 MG tablet  Commonly known as:  PLAVIX   75 mg, Oral, Daily      DULoxetine 60 MG capsule  Commonly known as:  CYMBALTA   60 mg, Oral, Daily      famotidine 20 MG tablet  Commonly known as:  PEPCID   20 mg, Oral, 2 Times Daily      gabapentin 400 MG capsule  Commonly known as:  NEURONTIN   400 mg, Oral, Daily      levothyroxine 50 MCG tablet  Commonly known as:  SYNTHROID, LEVOTHROID   50 mcg, Oral, Daily      losartan-hydrochlorothiazide 50-12.5 MG per tablet  Commonly known as:  HYZAAR   1 tablet, Oral, Daily      metFORMIN 500 MG tablet  Commonly known as:  GLUCOPHAGE   500 mg, Oral, 2 Times Daily With Meals      SITagliptin 100 MG tablet  Commonly known as:  JANUVIA   100 mg, Oral, Daily      traMADol 50 MG tablet  Commonly known as:  ULTRAM   50 mg, Oral, Every 6 Hours PRN      traZODone 50 MG tablet  Commonly known as:  DESYREL   50 mg, Oral, Nightly      TRESIBA FLEXTOUCH SC   20 Units, Subcutaneous, Nightly      UNKNOWN TO PATIENT   1 tablet, Oral, Daily, DEPRESSION MEDICATION BUT NAME UNKNOWN       VITAMIN D2 PO   " 50,000 Units, Oral, Weekly, TUES            No future appointments.   Contact information for follow-up providers     Matthew Marquis MD .    Specialty:  Internal Medicine  Contact information:  [address] [facility] [address] [phone]             Tanisha Romero Jr., MD Follow up in 2 week(s).    Specialty:  Vascular Surgery  Why:  For staple removal.  Will need to come to the office.  Contact information:  [address]  [facility] [address]  [facility] [id number] [phone]                   Contact information for after-discharge care     Home Medical Care     [facility] .    Service:  Home Health Services  Contact information:  [address] [facility] [address]  [facility] [address]  [phone]                             TEST  RESULTS PENDING AT DISCHARGE       Billin, Post Op Global    Tommy Barth MD  Office Number [phone]    20  10:08

## 2020-04-16 NOTE — PROGRESS NOTES
Continued Stay Note  [facility]     Patient Name: Gwendolyn Mcwilliams  MRN: [id number]  Today's Date: 4/16/2020    Admit Date: 4/13/2020    Discharge Plan     Row Name 04/16/20 1021       Plan    Plan  Home with Mayda ALVAREZ and 3 in1 commode from [place]    Patient/Family in Agreement with Plan  yes    Plan Comments  DC orders noted. PEDRO LUIS called Herman BS to be delivered to pt at bedside this am. PEDRO LUIS spoke with LASHAWN Griffith and she states at bedside. CCP called Mary/Mayda ALVAREZ to notify of dc today, she will arrange for visit tomorrow. PEDRO LUIS spoke with pt at bedside, with interpretor IPAD, IMM Notice given to patient, explained Mayda ALVAREZ will call for appointment to come out to house. She states her family will drive her home. PEDRO LUIS also had Racquel [phone] on speaker phone and translated to her, awaiting pts nausea symptoms to alleviate and then nurse Griffith Will call her to pickup pt. Updated her regarding Mayda ALVAREZ. She verbalized understanding. Also confirmed pt would have enough food and essentials for 2 weeks when going home, and she confirmed or has plenty of family to get her anythign she needs.  Rob HARDIN/CCP        Discharge Codes    No documentation.       Expected Discharge Date and Time     Expected Discharge Date Expected Discharge Time    Apr 16, 2020             Lee Ann Flaherty RN

## 2020-04-16 NOTE — PROGRESS NOTES
Case Management Discharge Note      [facility]         Destination      No service has been selected for the patient.      Durable Medical Equipment      No service has been selected for the patient.      Dialysis/Infusion      No service has been selected for the patient.      Home Medical Care - Selection Complete      Service Provider Request Status Selected Services Address Phone Number Fax Number    [facility] Selected Home Health Services [address] [id number] [phone]      Therapy      No service has been selected for the patient.      Community Resources      No service has been selected for the patient.        Transportation Services  Private: Car    Final Discharge Disposition Code: 06 - home with home health care

## 2020-04-16 NOTE — NURSING NOTE
"Pt states nausea is much improved. Pt told me through  that \"I don't like the food here so I will feel better when I can eat my food from home. I will take all me meds when I can eat my food from home too.\" Discharge instructions given through , verbal permission given for me to also explain discharge instructions to patient's friend Racquel who will come and pick the pt up  "

## 2020-04-17 ENCOUNTER — READMISSION MANAGEMENT (OUTPATIENT)
Dept: CALL CENTER | Facility: HOSPITAL | Age: 75
End: 2020-04-17

## 2020-04-20 ENCOUNTER — READMISSION MANAGEMENT (OUTPATIENT)
Dept: CALL CENTER | Facility: HOSPITAL | Age: 75
End: 2020-04-20

## 2020-04-20 NOTE — OUTREACH NOTE
General Surgery Week 1 Survey      Responses   [facility] patient discharged from?  [place]   Does the patient have one of the following disease processes/diagnoses(primary or secondary)?  General Surgery   Is there a successful TCM telephone encounter documented?  No   Week 1 attempt successful?  Yes   Call start time  1406   Call end time  1420   Is patient permission given to speak with other caregiver?  Yes   List who call center can speak with  sister in law.   Meds reviewed with patient/caregiver?  Yes   Is the patient having any side effects they believe may be caused by any medication additions or changes?  No   Does the patient have all medications related to this admission filled (includes all antibiotics, pain medications, etc.)  Yes   Does the patient have a follow up appointment scheduled with their surgeon?  Yes   Has the patient kept scheduled appointments due by today?  Yes   What is the Home health agency?   [facility]    Has home health visited the patient within 72 hours of discharge?  Yes   Has all DME been delivered?  Yes   Did the patient receive a copy of their discharge instructions?  Yes   Nursing interventions  Reviewed instructions with patient   What is the patient's perception of their health status since discharge?  Same   Is the patient /caregiver able to teach back basic post-op care?  Continue use of incentive spirometry at least 1 week post discharge, Practice 'cough and deep breath', Drive as instructed by MD in discharge instructions, Take showers only when approved by MD-sponge bathe until then, No tub bath, swimming, or hot tub until instructed by MD, Keep incision areas clean,dry and protected, Do not remove steri-strips, Lifting as instructed by MD in discharge instructions   Is the patient/caregiver able to teach back signs and symptoms of incisional infection?  Increased drainage or bleeding, Incisional warmth, Pus or odor from incision, Fever   Is the  patient/caregiver able to teach back steps to recovery at home?  Set small, achievable goals for return to baseline health, Rest and rebuild strength, gradually increase activity, Weigh daily, Practice good oral hygiene, Eat a well-balance diet, Make a list of questions for surgeon's appointment   Is the patient/caregiver able to teach back the hierarchy of who to call/visit for symptoms/problems? PCP, Specialist, Home health nurse, Urgent Care, ED, 911  Yes   Additional teach back comments  Spoke with a family member who is the only one who speaks English. She reports the pt is doing ok but has no appeitite. She has had a video appointment with MD and will have an in office appointment next week.   Week 1 call completed?  Yes          Yeni Zendejas RN

## 2020-04-27 ENCOUNTER — READMISSION MANAGEMENT (OUTPATIENT)
Dept: CALL CENTER | Facility: HOSPITAL | Age: 75
End: 2020-04-27

## 2020-04-27 NOTE — OUTREACH NOTE
General Surgery Week 2 Survey      Responses   [facility] patient discharged from?  [place]   Does the patient have one of the following disease processes/diagnoses(primary or secondary)?  General Surgery   Week 2 attempt successful?  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  Urdu--PAM spoke to pt    Is patient permission given to speak with other caregiver?  Yes   List who call center can speak with  sister in law.   Person spoke with today (if not patient) and relationship  PAM--Gabriela translated for pt   Meds reviewed with patient/caregiver?  Yes   Is the patient taking all medications as directed (includes completed medication regime)?  Yes   Medication comments  having some pain but not too bad   Has the patient kept scheduled appointments due by today?  N/A   Comments  will have lab work than appt on 4/29   What is the Home health agency?   [facility]   Psychosocial issues?  No   What is the patient's perception of their health status since discharge?  Improving   Is the patient/caregiver able to teach back signs and symptoms of incisional infection?  Increased drainage or bleeding, Incisional warmth, Pus or odor from incision, Fever, Increased redness, swelling or pain at the incisonal site   Is the patient/caregiver able to teach back the hierarchy of who to call/visit for symptoms/problems? PCP, Specialist, Home health nurse, Urgent Care, ED, 911  Yes   Additional teach back comments  Doing well and insicion is feeling better   Week 2 call completed?  Yes          Jeanine Lai RN

## 2020-05-05 ENCOUNTER — READMISSION MANAGEMENT (OUTPATIENT)
Dept: CALL CENTER | Facility: HOSPITAL | Age: 75
End: 2020-05-05

## 2020-05-05 NOTE — OUTREACH NOTE
General Surgery Week 3 Survey      Responses   [facility] patient discharged from?  [facility]   Does the patient have one of the following disease processes/diagnoses(primary or secondary)?  General Surgery   Week 3 attempt successful?  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  Yes   Is the patient having any side effects they believe may be caused by any medication additions or changes?  No   Does the patient have all medications related to this admission filled (includes all antibiotics, pain medications, etc.)  Yes   Is the patient taking all medications as directed (includes completed medication regime)?  Yes   Does the patient have a follow up appointment scheduled with their surgeon?  Yes   Has the patient kept scheduled appointments due by today?  Yes   What is the Home health agency?   [facility]   Has home health visited the patient within 72 hours of discharge?  Yes   Psychosocial issues?  No   Comments  had sutures removed on 4/29/20 in office with Dr Romero   Did the patient receive a copy of their discharge instructions?  Yes   Nursing interventions  Reviewed instructions with patient   What is the patient's perception of their health status since discharge?  Improving   Nursing interventions  Nurse provided patient education   Is the patient /caregiver able to teach back basic post-op care?  Keep incision areas clean,dry and protected   Is the patient/caregiver able to teach back signs and symptoms of incisional infection?  Increased redness, swelling or pain at the incisonal site, Increased drainage or bleeding, Incisional warmth, Pus or odor from incision, Fever   Is the patient/caregiver able to teach back  steps to recovery at home?  Rest and rebuild strength, gradually increase activity   Is the patient/caregiver able to teach back the hierarchy of who to call/visit for symptoms/problems? PCP, Specialist, Home health nurse, Urgent Care, ED, 911  Yes   Additional teach back comments  sister-in-law states incision healing well   Week 3 call completed?  Yes          Li Swain RN

## 2020-05-15 ENCOUNTER — READMISSION MANAGEMENT (OUTPATIENT)
Dept: CALL CENTER | Facility: HOSPITAL | Age: 75
End: 2020-05-15

## 2020-05-15 NOTE — OUTREACH NOTE
General Surgery Week 4 Survey      Responses   [facility] patient discharged from?  [place]   Does the patient have one of the following disease processes/diagnoses(primary or secondary)?  General Surgery   Week 4 attempt successful?  No          Yin Tello RN

## 2020-05-18 ENCOUNTER — HOSPITAL ENCOUNTER (OUTPATIENT)
Dept: OTHER | Facility: HOSPITAL | Age: 75
Discharge: HOME | End: 2020-05-18
Attending: INTERNAL MEDICINE

## 2020-05-18 LAB
ALBUMIN SERPL-MCNC: 4.2 G/DL (ref 3.5–5)
ALBUMIN/GLOB SERPL: 1.1 {RATIO} (ref 1.4–2.6)
ALP SERPL-CCNC: 97 U/L (ref 43–160)
ALT SERPL-CCNC: 14 U/L (ref 10–40)
ANION GAP SERPL CALC-SCNC: 16 MMOL/L (ref 8–19)
AST SERPL-CCNC: 25 U/L (ref 15–50)
BASOPHILS # BLD AUTO: 0.08 10*3/UL (ref 0–0.2)
BASOPHILS NFR BLD AUTO: 0.8 % (ref 0–3)
BILIRUB SERPL-MCNC: 0.19 MG/DL (ref 0.2–1.3)
BUN SERPL-MCNC: 11 MG/DL (ref 5–25)
BUN/CREAT SERPL: 15 {RATIO} (ref 6–20)
CALCIUM SERPL-MCNC: 10 MG/DL (ref 8.7–10.4)
CHLORIDE SERPL-SCNC: 102 MMOL/L (ref 99–111)
CHOLEST SERPL-MCNC: 201 MG/DL (ref 107–200)
CHOLEST/HDLC SERPL: 2.7 {RATIO} (ref 3–6)
CONV ABS IMM GRAN: 0.04 10*3/UL (ref 0–0.2)
CONV CO2: 25 MMOL/L (ref 22–32)
CONV IMMATURE GRAN: 0.4 % (ref 0–1.8)
CONV TOTAL PROTEIN: 8.2 G/DL (ref 6.3–8.2)
CREAT UR-MCNC: 0.73 MG/DL (ref 0.5–0.9)
DEPRECATED RDW RBC AUTO: 52.5 FL (ref 36.4–46.3)
EOSINOPHIL # BLD AUTO: 0.13 10*3/UL (ref 0–0.7)
EOSINOPHIL # BLD AUTO: 1.3 % (ref 0–7)
ERYTHROCYTE [DISTWIDTH] IN BLOOD BY AUTOMATED COUNT: 15.2 % (ref 11.7–14.4)
EST. AVERAGE GLUCOSE BLD GHB EST-MCNC: 194 MG/DL
GFR SERPLBLD BASED ON 1.73 SQ M-ARVRAT: >60 ML/MIN/{1.73_M2}
GLOBULIN UR ELPH-MCNC: 4 G/DL (ref 2–3.5)
GLUCOSE SERPL-MCNC: 241 MG/DL (ref 65–99)
HBA1C MFR BLD: 8.4 % (ref 3.5–5.7)
HCT VFR BLD AUTO: 37.4 % (ref 37–47)
HDLC SERPL-MCNC: 74 MG/DL (ref 40–60)
HGB BLD-MCNC: 11.4 G/DL (ref 12–16)
IRON SATN MFR SERPL: 9 % (ref 20–55)
IRON SERPL-MCNC: 40 UG/DL (ref 60–170)
LDLC SERPL CALC-MCNC: 111 MG/DL (ref 70–100)
LYMPHOCYTES # BLD AUTO: 2.67 10*3/UL (ref 1–5)
LYMPHOCYTES NFR BLD AUTO: 27.5 % (ref 20–45)
MCH RBC QN AUTO: 28.5 PG (ref 27–31)
MCHC RBC AUTO-ENTMCNC: 30.5 G/DL (ref 33–37)
MCV RBC AUTO: 93.5 FL (ref 81–99)
MONOCYTES # BLD AUTO: 1.26 10*3/UL (ref 0.2–1.2)
MONOCYTES NFR BLD AUTO: 13 % (ref 3–10)
NEUTROPHILS # BLD AUTO: 5.54 10*3/UL (ref 2–8)
NEUTROPHILS NFR BLD AUTO: 57 % (ref 30–85)
NRBC CBCN: 0 % (ref 0–0.7)
OSMOLALITY SERPL CALC.SUM OF ELEC: 293 MOSM/KG (ref 273–304)
PLATELET # BLD AUTO: 349 10*3/UL (ref 130–400)
PMV BLD AUTO: 10.7 FL (ref 9.4–12.3)
POTASSIUM SERPL-SCNC: 4.9 MMOL/L (ref 3.5–5.3)
RBC # BLD AUTO: 4 10*6/UL (ref 4.2–5.4)
SODIUM SERPL-SCNC: 138 MMOL/L (ref 135–147)
T4 FREE SERPL-MCNC: 1.1 NG/DL (ref 0.9–1.8)
TIBC SERPL-MCNC: 433 UG/DL (ref 245–450)
TRANSFERRIN SERPL-MCNC: 303 MG/DL (ref 250–380)
TRIGL SERPL-MCNC: 80 MG/DL (ref 40–150)
TSH SERPL-ACNC: 3.27 M[IU]/L (ref 0.27–4.2)
VIT B12 SERPL-MCNC: 405 PG/ML (ref 211–911)
VLDLC SERPL-MCNC: 16 MG/DL (ref 5–37)
WBC # BLD AUTO: 9.72 10*3/UL (ref 4.8–10.8)

## 2020-06-03 ENCOUNTER — HOSPITAL ENCOUNTER (OUTPATIENT)
Dept: OTHER | Facility: HOSPITAL | Age: 75
Discharge: HOME | End: 2020-06-03
Attending: INTERNAL MEDICINE

## 2020-06-03 LAB
APPEARANCE UR: ABNORMAL
BILIRUB UR QL: NEGATIVE
COLOR UR: YELLOW
CONV BACTERIA: NEGATIVE
CONV COLLECTION SOURCE (UA): ABNORMAL
CONV UROBILINOGEN IN URINE BY AUTOMATED TEST STRIP: 0.2 {EHRLICHU}/DL (ref 0.1–1)
GLUCOSE UR QL: >=1000 MG/DL
HGB UR QL STRIP: NEGATIVE
KETONES UR QL STRIP: NEGATIVE MG/DL
LEUKOCYTE ESTERASE UR QL STRIP: NEGATIVE
NITRITE UR QL STRIP: NEGATIVE
PH UR STRIP.AUTO: 5 [PH] (ref 5–8)
PROT UR QL: NEGATIVE MG/DL
RBC #/AREA URNS HPF: ABNORMAL /[HPF]
SP GR UR: 1.02 (ref 1–1.03)
WBC #/AREA URNS HPF: ABNORMAL /[HPF]

## 2020-06-05 LAB — BACTERIA UR CULT: NORMAL

## 2020-07-17 ENCOUNTER — HOSPITAL ENCOUNTER (OUTPATIENT)
Dept: GENERAL RADIOLOGY | Facility: HOSPITAL | Age: 75
Discharge: HOME | End: 2020-07-17
Attending: NURSE PRACTITIONER

## 2020-09-15 ENCOUNTER — HOSPITAL ENCOUNTER (OUTPATIENT)
Dept: DIABETES SERVICES | Facility: HOSPITAL | Age: 75
Setting detail: RECURRING SERIES
Discharge: HOME | End: 2020-12-14
Attending: NURSE PRACTITIONER

## 2020-11-23 ENCOUNTER — HOSPITAL ENCOUNTER (INPATIENT)
Facility: HOSPITAL | Age: 75
LOS: 9 days | Discharge: NURSING, REHAB OR LONG-TERM CARE | End: 2020-12-02
Attending: EMERGENCY MEDICINE | Admitting: SURGERY

## 2020-11-23 ENCOUNTER — APPOINTMENT (OUTPATIENT)
Dept: GENERAL RADIOLOGY | Facility: HOSPITAL | Age: 75
End: 2020-11-23

## 2020-11-23 DIAGNOSIS — L03.116 CELLULITIS OF LEFT LEG: Primary | ICD-10-CM

## 2020-11-23 DIAGNOSIS — Z89.612 S/P AKA (ABOVE KNEE AMPUTATION), LEFT (HCC): ICD-10-CM

## 2020-11-23 DIAGNOSIS — I73.9 PVD (PERIPHERAL VASCULAR DISEASE) WITH CLAUDICATION (HCC): ICD-10-CM

## 2020-11-23 DIAGNOSIS — Z89.511 S/P BKA (BELOW KNEE AMPUTATION), RIGHT (HCC): ICD-10-CM

## 2020-11-23 DIAGNOSIS — B34.8 RHINOVIRUS INFECTION: ICD-10-CM

## 2020-11-23 DIAGNOSIS — I73.9 PERIPHERAL ARTERY DISEASE (HCC): ICD-10-CM

## 2020-11-23 DIAGNOSIS — Z98.890 HISTORY OF PERIPHERAL ARTERY BYPASS: ICD-10-CM

## 2020-11-23 DIAGNOSIS — R73.9 HYPERGLYCEMIA: ICD-10-CM

## 2020-11-23 DIAGNOSIS — E11.65 POORLY CONTROLLED DIABETES MELLITUS (HCC): ICD-10-CM

## 2020-11-23 LAB
ALBUMIN SERPL-MCNC: 4.2 G/DL (ref 3.5–5.2)
ALBUMIN/GLOB SERPL: 1.2 G/DL
ALP SERPL-CCNC: 77 U/L (ref 39–117)
ALT SERPL W P-5'-P-CCNC: 16 U/L (ref 1–33)
ANION GAP SERPL CALCULATED.3IONS-SCNC: 9.9 MMOL/L (ref 5–15)
APTT PPP: 27.3 SECONDS (ref 22.7–35.4)
AST SERPL-CCNC: 14 U/L (ref 1–32)
B PARAPERT DNA SPEC QL NAA+PROBE: NOT DETECTED
B PERT DNA SPEC QL NAA+PROBE: NOT DETECTED
BASOPHILS # BLD AUTO: 0.04 10*3/MM3 (ref 0–0.2)
BASOPHILS NFR BLD AUTO: 0.4 % (ref 0–1.5)
BILIRUB SERPL-MCNC: 0.3 MG/DL (ref 0–1.2)
BUN SERPL-MCNC: 14 MG/DL (ref 8–23)
BUN/CREAT SERPL: 19.4 (ref 7–25)
C PNEUM DNA NPH QL NAA+NON-PROBE: NOT DETECTED
CALCIUM SPEC-SCNC: 10.8 MG/DL (ref 8.6–10.5)
CHLORIDE SERPL-SCNC: 95 MMOL/L (ref 98–107)
CO2 SERPL-SCNC: 29.1 MMOL/L (ref 22–29)
CREAT SERPL-MCNC: 0.72 MG/DL (ref 0.57–1)
CRP SERPL-MCNC: 5.03 MG/DL (ref 0–0.5)
D-LACTATE SERPL-SCNC: 1.8 MMOL/L (ref 0.5–2)
DEPRECATED RDW RBC AUTO: 43 FL (ref 37–54)
EOSINOPHIL # BLD AUTO: 0.08 10*3/MM3 (ref 0–0.4)
EOSINOPHIL NFR BLD AUTO: 0.9 % (ref 0.3–6.2)
ERYTHROCYTE [DISTWIDTH] IN BLOOD BY AUTOMATED COUNT: 13.3 % (ref 12.3–15.4)
ERYTHROCYTE [SEDIMENTATION RATE] IN BLOOD: 83 MM/HR (ref 0–30)
FLUAV SUBTYP SPEC NAA+PROBE: NOT DETECTED
FLUBV RNA ISLT QL NAA+PROBE: NOT DETECTED
GFR SERPL CREATININE-BSD FRML MDRD: 79 ML/MIN/1.73
GFR SERPL CREATININE-BSD FRML MDRD: 96 ML/MIN/1.73
GLOBULIN UR ELPH-MCNC: 3.5 GM/DL
GLUCOSE BLDC GLUCOMTR-MCNC: 324 MG/DL (ref 70–130)
GLUCOSE SERPL-MCNC: 335 MG/DL (ref 65–99)
HADV DNA SPEC NAA+PROBE: NOT DETECTED
HCOV 229E RNA SPEC QL NAA+PROBE: NOT DETECTED
HCOV HKU1 RNA SPEC QL NAA+PROBE: NOT DETECTED
HCOV NL63 RNA SPEC QL NAA+PROBE: NOT DETECTED
HCOV OC43 RNA SPEC QL NAA+PROBE: NOT DETECTED
HCT VFR BLD AUTO: 35.4 % (ref 34–46.6)
HGB BLD-MCNC: 11.7 G/DL (ref 12–15.9)
HMPV RNA NPH QL NAA+NON-PROBE: NOT DETECTED
HPIV1 RNA SPEC QL NAA+PROBE: NOT DETECTED
HPIV2 RNA SPEC QL NAA+PROBE: NOT DETECTED
HPIV3 RNA NPH QL NAA+PROBE: NOT DETECTED
HPIV4 P GENE NPH QL NAA+PROBE: NOT DETECTED
IMM GRANULOCYTES # BLD AUTO: 0.03 10*3/MM3 (ref 0–0.05)
IMM GRANULOCYTES NFR BLD AUTO: 0.3 % (ref 0–0.5)
INR PPP: 0.99 (ref 0.9–1.1)
LYMPHOCYTES # BLD AUTO: 2.35 10*3/MM3 (ref 0.7–3.1)
LYMPHOCYTES NFR BLD AUTO: 26.2 % (ref 19.6–45.3)
M PNEUMO IGG SER IA-ACNC: NOT DETECTED
MCH RBC QN AUTO: 29.1 PG (ref 26.6–33)
MCHC RBC AUTO-ENTMCNC: 33.1 G/DL (ref 31.5–35.7)
MCV RBC AUTO: 88.1 FL (ref 79–97)
MONOCYTES # BLD AUTO: 1.07 10*3/MM3 (ref 0.1–0.9)
MONOCYTES NFR BLD AUTO: 11.9 % (ref 5–12)
NEUTROPHILS NFR BLD AUTO: 5.41 10*3/MM3 (ref 1.7–7)
NEUTROPHILS NFR BLD AUTO: 60.3 % (ref 42.7–76)
NRBC BLD AUTO-RTO: 0 /100 WBC (ref 0–0.2)
PLATELET # BLD AUTO: 352 10*3/MM3 (ref 140–450)
PMV BLD AUTO: 10 FL (ref 6–12)
POTASSIUM SERPL-SCNC: 4.1 MMOL/L (ref 3.5–5.2)
PROT SERPL-MCNC: 7.7 G/DL (ref 6–8.5)
PROTHROMBIN TIME: 12.9 SECONDS (ref 11.7–14.2)
RBC # BLD AUTO: 4.02 10*6/MM3 (ref 3.77–5.28)
RHINOVIRUS RNA SPEC NAA+PROBE: DETECTED
RSV RNA NPH QL NAA+NON-PROBE: NOT DETECTED
SARS-COV-2 RNA NPH QL NAA+NON-PROBE: NOT DETECTED
SODIUM SERPL-SCNC: 134 MMOL/L (ref 136–145)
WBC # BLD AUTO: 8.98 10*3/MM3 (ref 3.4–10.8)

## 2020-11-23 PROCEDURE — 25010000002 FENTANYL CITRATE (PF) 100 MCG/2ML SOLUTION: Performed by: EMERGENCY MEDICINE

## 2020-11-23 PROCEDURE — 0202U NFCT DS 22 TRGT SARS-COV-2: CPT | Performed by: EMERGENCY MEDICINE

## 2020-11-23 PROCEDURE — 85610 PROTHROMBIN TIME: CPT | Performed by: EMERGENCY MEDICINE

## 2020-11-23 PROCEDURE — 25010000002 CEFTRIAXONE PER 250 MG: Performed by: EMERGENCY MEDICINE

## 2020-11-23 PROCEDURE — 25010000002 VANCOMYCIN PER 500 MG: Performed by: EMERGENCY MEDICINE

## 2020-11-23 PROCEDURE — 86140 C-REACTIVE PROTEIN: CPT | Performed by: EMERGENCY MEDICINE

## 2020-11-23 PROCEDURE — 85025 COMPLETE CBC W/AUTO DIFF WBC: CPT | Performed by: EMERGENCY MEDICINE

## 2020-11-23 PROCEDURE — 80053 COMPREHEN METABOLIC PANEL: CPT | Performed by: EMERGENCY MEDICINE

## 2020-11-23 PROCEDURE — 99284 EMERGENCY DEPT VISIT MOD MDM: CPT

## 2020-11-23 PROCEDURE — 82962 GLUCOSE BLOOD TEST: CPT

## 2020-11-23 PROCEDURE — 85730 THROMBOPLASTIN TIME PARTIAL: CPT | Performed by: EMERGENCY MEDICINE

## 2020-11-23 PROCEDURE — 73630 X-RAY EXAM OF FOOT: CPT

## 2020-11-23 PROCEDURE — 85652 RBC SED RATE AUTOMATED: CPT | Performed by: EMERGENCY MEDICINE

## 2020-11-23 PROCEDURE — 83605 ASSAY OF LACTIC ACID: CPT | Performed by: EMERGENCY MEDICINE

## 2020-11-23 RX ORDER — BISACODYL 10 MG
10 SUPPOSITORY, RECTAL RECTAL DAILY PRN
Status: DISCONTINUED | OUTPATIENT
Start: 2020-11-23 | End: 2020-12-02 | Stop reason: HOSPADM

## 2020-11-23 RX ORDER — ACETAMINOPHEN 160 MG/5ML
650 SOLUTION ORAL EVERY 4 HOURS PRN
Status: DISCONTINUED | OUTPATIENT
Start: 2020-11-23 | End: 2020-11-28

## 2020-11-23 RX ORDER — VANCOMYCIN HYDROCHLORIDE 1 G/200ML
20 INJECTION, SOLUTION INTRAVENOUS ONCE
Status: COMPLETED | OUTPATIENT
Start: 2020-11-23 | End: 2020-11-23

## 2020-11-23 RX ORDER — ACETAMINOPHEN 650 MG/1
650 SUPPOSITORY RECTAL EVERY 4 HOURS PRN
Status: DISCONTINUED | OUTPATIENT
Start: 2020-11-23 | End: 2020-11-28

## 2020-11-23 RX ORDER — ONDANSETRON 2 MG/ML
4 INJECTION INTRAMUSCULAR; INTRAVENOUS EVERY 6 HOURS PRN
Status: DISCONTINUED | OUTPATIENT
Start: 2020-11-23 | End: 2020-12-02 | Stop reason: HOSPADM

## 2020-11-23 RX ORDER — ALUMINA, MAGNESIA, AND SIMETHICONE 2400; 2400; 240 MG/30ML; MG/30ML; MG/30ML
15 SUSPENSION ORAL EVERY 6 HOURS PRN
Status: DISCONTINUED | OUTPATIENT
Start: 2020-11-23 | End: 2020-12-02 | Stop reason: HOSPADM

## 2020-11-23 RX ORDER — CEFTRIAXONE SODIUM 1 G/50ML
1 INJECTION, SOLUTION INTRAVENOUS ONCE
Status: COMPLETED | OUTPATIENT
Start: 2020-11-23 | End: 2020-11-23

## 2020-11-23 RX ORDER — ONDANSETRON 4 MG/1
4 TABLET, FILM COATED ORAL EVERY 6 HOURS PRN
Status: DISCONTINUED | OUTPATIENT
Start: 2020-11-23 | End: 2020-12-02 | Stop reason: HOSPADM

## 2020-11-23 RX ORDER — FENTANYL CITRATE 50 UG/ML
50 INJECTION, SOLUTION INTRAMUSCULAR; INTRAVENOUS ONCE
Status: COMPLETED | OUTPATIENT
Start: 2020-11-23 | End: 2020-11-23

## 2020-11-23 RX ORDER — SODIUM CHLORIDE 9 MG/ML
75 INJECTION, SOLUTION INTRAVENOUS CONTINUOUS
Status: DISCONTINUED | OUTPATIENT
Start: 2020-11-23 | End: 2020-11-29

## 2020-11-23 RX ORDER — SODIUM CHLORIDE 0.9 % (FLUSH) 0.9 %
10 SYRINGE (ML) INJECTION AS NEEDED
Status: DISCONTINUED | OUTPATIENT
Start: 2020-11-23 | End: 2020-12-02 | Stop reason: HOSPADM

## 2020-11-23 RX ORDER — BISACODYL 5 MG/1
5 TABLET, DELAYED RELEASE ORAL DAILY PRN
Status: DISCONTINUED | OUTPATIENT
Start: 2020-11-23 | End: 2020-12-02 | Stop reason: HOSPADM

## 2020-11-23 RX ORDER — ACETAMINOPHEN 325 MG/1
650 TABLET ORAL EVERY 4 HOURS PRN
Status: DISCONTINUED | OUTPATIENT
Start: 2020-11-23 | End: 2020-11-28

## 2020-11-23 RX ADMIN — FENTANYL CITRATE 50 MCG: 50 INJECTION, SOLUTION INTRAMUSCULAR; INTRAVENOUS at 18:21

## 2020-11-23 RX ADMIN — CEFTRIAXONE SODIUM 1 G: 1 INJECTION, SOLUTION INTRAVENOUS at 21:17

## 2020-11-23 RX ADMIN — VANCOMYCIN HYDROCHLORIDE 1000 MG: 1 INJECTION, SOLUTION INTRAVENOUS at 22:34

## 2020-11-23 RX ADMIN — SODIUM CHLORIDE 75 ML/HR: 9 INJECTION, SOLUTION INTRAVENOUS at 18:17

## 2020-11-24 ENCOUNTER — APPOINTMENT (OUTPATIENT)
Dept: CARDIOLOGY | Facility: HOSPITAL | Age: 75
End: 2020-11-24

## 2020-11-24 PROBLEM — M81.0 OSTEOPOROSIS: Status: ACTIVE | Noted: 2020-11-24

## 2020-11-24 PROBLEM — Z89.511 S/P BKA (BELOW KNEE AMPUTATION), RIGHT (HCC): Status: ACTIVE | Noted: 2020-11-24

## 2020-11-24 PROBLEM — E03.9 HYPOTHYROIDISM: Status: ACTIVE | Noted: 2020-11-24

## 2020-11-24 PROBLEM — I10 ESSENTIAL HYPERTENSION: Status: ACTIVE | Noted: 2020-11-24

## 2020-11-24 PROBLEM — E78.5 HYPERLIPIDEMIA: Status: ACTIVE | Noted: 2020-11-24

## 2020-11-24 PROBLEM — K21.9 GERD (GASTROESOPHAGEAL REFLUX DISEASE): Status: ACTIVE | Noted: 2020-11-24

## 2020-11-24 PROBLEM — E11.65 TYPE 2 DIABETES MELLITUS WITH HYPERGLYCEMIA (HCC): Status: ACTIVE | Noted: 2020-04-14

## 2020-11-24 LAB
ANION GAP SERPL CALCULATED.3IONS-SCNC: 7.9 MMOL/L (ref 5–15)
BH CV LOWER ARTERIAL LEFT LOW THIGH SYS MAX: 162 MMHG
BH CV LOWER ARTERIAL LEFT POPLITEAL SYS MAX: 186 MMHG
BUN SERPL-MCNC: 10 MG/DL (ref 8–23)
BUN/CREAT SERPL: 14.1 (ref 7–25)
CALCIUM SPEC-SCNC: 9.3 MG/DL (ref 8.6–10.5)
CHLORIDE SERPL-SCNC: 103 MMOL/L (ref 98–107)
CO2 SERPL-SCNC: 27.1 MMOL/L (ref 22–29)
CREAT SERPL-MCNC: 0.71 MG/DL (ref 0.57–1)
DEPRECATED RDW RBC AUTO: 41.5 FL (ref 37–54)
ERYTHROCYTE [DISTWIDTH] IN BLOOD BY AUTOMATED COUNT: 13.2 % (ref 12.3–15.4)
GFR SERPL CREATININE-BSD FRML MDRD: 81 ML/MIN/1.73
GFR SERPL CREATININE-BSD FRML MDRD: 98 ML/MIN/1.73
GLUCOSE BLDC GLUCOMTR-MCNC: 135 MG/DL (ref 70–130)
GLUCOSE BLDC GLUCOMTR-MCNC: 189 MG/DL (ref 70–130)
GLUCOSE BLDC GLUCOMTR-MCNC: 224 MG/DL (ref 70–130)
GLUCOSE BLDC GLUCOMTR-MCNC: 303 MG/DL (ref 70–130)
GLUCOSE SERPL-MCNC: 196 MG/DL (ref 65–99)
HBA1C MFR BLD: 9.8 % (ref 4.8–5.6)
HCT VFR BLD AUTO: 30.6 % (ref 34–46.6)
HGB BLD-MCNC: 10.2 G/DL (ref 12–15.9)
MCH RBC QN AUTO: 29.1 PG (ref 26.6–33)
MCHC RBC AUTO-ENTMCNC: 33.3 G/DL (ref 31.5–35.7)
MCV RBC AUTO: 87.2 FL (ref 79–97)
PLATELET # BLD AUTO: 307 10*3/MM3 (ref 140–450)
PMV BLD AUTO: 9.9 FL (ref 6–12)
POTASSIUM SERPL-SCNC: 3.8 MMOL/L (ref 3.5–5.2)
RBC # BLD AUTO: 3.51 10*6/MM3 (ref 3.77–5.28)
SODIUM SERPL-SCNC: 138 MMOL/L (ref 136–145)
UPPER ARTERIAL LEFT ARM BRACHIAL SYS MAX: 169 MMHG
UPPER ARTERIAL RIGHT ARM BRACHIAL SYS MAX: 164 MMHG
WBC # BLD AUTO: 8.98 10*3/MM3 (ref 3.4–10.8)

## 2020-11-24 PROCEDURE — 93923 UPR/LXTR ART STDY 3+ LVLS: CPT

## 2020-11-24 PROCEDURE — 63710000001 INSULIN LISPRO (HUMAN) PER 5 UNITS: Performed by: NURSE PRACTITIONER

## 2020-11-24 PROCEDURE — 83036 HEMOGLOBIN GLYCOSYLATED A1C: CPT | Performed by: NURSE PRACTITIONER

## 2020-11-24 PROCEDURE — 25010000002 VANCOMYCIN PER 500 MG: Performed by: HOSPITALIST

## 2020-11-24 PROCEDURE — 85027 COMPLETE CBC AUTOMATED: CPT | Performed by: NURSE PRACTITIONER

## 2020-11-24 PROCEDURE — 80048 BASIC METABOLIC PNL TOTAL CA: CPT | Performed by: NURSE PRACTITIONER

## 2020-11-24 PROCEDURE — 63710000001 INSULIN GLARGINE PER 5 UNITS: Performed by: HOSPITALIST

## 2020-11-24 PROCEDURE — 82962 GLUCOSE BLOOD TEST: CPT

## 2020-11-24 PROCEDURE — 25010000002 PIPERACILLIN SOD-TAZOBACTAM PER 1 G: Performed by: HOSPITALIST

## 2020-11-24 RX ORDER — DULOXETIN HYDROCHLORIDE 60 MG/1
60 CAPSULE, DELAYED RELEASE ORAL DAILY
Status: DISCONTINUED | OUTPATIENT
Start: 2020-11-24 | End: 2020-12-02 | Stop reason: HOSPADM

## 2020-11-24 RX ORDER — GABAPENTIN 400 MG/1
400 CAPSULE ORAL DAILY
Status: DISCONTINUED | OUTPATIENT
Start: 2020-11-24 | End: 2020-11-24

## 2020-11-24 RX ORDER — GABAPENTIN 400 MG/1
400 CAPSULE ORAL EVERY 8 HOURS SCHEDULED
Status: DISCONTINUED | OUTPATIENT
Start: 2020-11-24 | End: 2020-12-02 | Stop reason: HOSPADM

## 2020-11-24 RX ORDER — METOCLOPRAMIDE 5 MG/1
5 TABLET ORAL
Status: ON HOLD | COMMUNITY
End: 2020-12-01 | Stop reason: SDUPTHER

## 2020-11-24 RX ORDER — ATORVASTATIN CALCIUM 20 MG/1
10 TABLET, FILM COATED ORAL DAILY
Status: DISCONTINUED | OUTPATIENT
Start: 2020-11-24 | End: 2020-11-24

## 2020-11-24 RX ORDER — BUSPIRONE HYDROCHLORIDE 10 MG/1
10 TABLET ORAL DAILY
Status: DISCONTINUED | OUTPATIENT
Start: 2020-11-24 | End: 2020-11-24

## 2020-11-24 RX ORDER — ERGOCALCIFEROL 1.25 MG/1
50000 CAPSULE ORAL
Status: DISCONTINUED | OUTPATIENT
Start: 2020-11-24 | End: 2020-12-02 | Stop reason: HOSPADM

## 2020-11-24 RX ORDER — LOSARTAN POTASSIUM 50 MG/1
50 TABLET ORAL
Status: DISCONTINUED | OUTPATIENT
Start: 2020-11-24 | End: 2020-12-02 | Stop reason: HOSPADM

## 2020-11-24 RX ORDER — BUSPIRONE HYDROCHLORIDE 10 MG/1
10 TABLET ORAL EVERY 12 HOURS SCHEDULED
Status: DISCONTINUED | OUTPATIENT
Start: 2020-11-24 | End: 2020-12-02 | Stop reason: HOSPADM

## 2020-11-24 RX ORDER — ATORVASTATIN CALCIUM 20 MG/1
20 TABLET, FILM COATED ORAL DAILY
Status: DISCONTINUED | OUTPATIENT
Start: 2020-11-25 | End: 2020-12-02 | Stop reason: HOSPADM

## 2020-11-24 RX ORDER — INSULIN GLARGINE 100 [IU]/ML
10 INJECTION, SOLUTION SUBCUTANEOUS NIGHTLY
Status: DISCONTINUED | OUTPATIENT
Start: 2020-11-24 | End: 2020-11-29

## 2020-11-24 RX ORDER — BUSPIRONE HYDROCHLORIDE 10 MG/1
10 TABLET ORAL 2 TIMES DAILY
COMMUNITY

## 2020-11-24 RX ORDER — TRAZODONE HYDROCHLORIDE 50 MG/1
50 TABLET ORAL NIGHTLY
Status: DISCONTINUED | OUTPATIENT
Start: 2020-11-24 | End: 2020-11-24

## 2020-11-24 RX ORDER — VANCOMYCIN HYDROCHLORIDE 1 G/200ML
1000 INJECTION, SOLUTION INTRAVENOUS EVERY 24 HOURS
Status: DISCONTINUED | OUTPATIENT
Start: 2020-11-24 | End: 2020-11-25

## 2020-11-24 RX ORDER — ONDANSETRON 4 MG/1
4 TABLET, FILM COATED ORAL EVERY 6 HOURS PRN
Status: DISCONTINUED | OUTPATIENT
Start: 2020-11-24 | End: 2020-11-30

## 2020-11-24 RX ORDER — FAMOTIDINE 20 MG/1
20 TABLET, FILM COATED ORAL
Status: DISCONTINUED | OUTPATIENT
Start: 2020-11-24 | End: 2020-12-02 | Stop reason: HOSPADM

## 2020-11-24 RX ORDER — NICOTINE POLACRILEX 4 MG
15 LOZENGE BUCCAL
Status: DISCONTINUED | OUTPATIENT
Start: 2020-11-24 | End: 2020-12-02 | Stop reason: HOSPADM

## 2020-11-24 RX ORDER — DEXTROSE MONOHYDRATE 25 G/50ML
25 INJECTION, SOLUTION INTRAVENOUS
Status: DISCONTINUED | OUTPATIENT
Start: 2020-11-24 | End: 2020-12-02 | Stop reason: HOSPADM

## 2020-11-24 RX ORDER — CILOSTAZOL 100 MG/1
100 TABLET ORAL 2 TIMES DAILY
COMMUNITY
End: 2020-12-02 | Stop reason: HOSPADM

## 2020-11-24 RX ORDER — TRAMADOL HYDROCHLORIDE 50 MG/1
50 TABLET ORAL EVERY 6 HOURS PRN
Status: DISCONTINUED | OUTPATIENT
Start: 2020-11-24 | End: 2020-11-24

## 2020-11-24 RX ORDER — LOSARTAN POTASSIUM 50 MG/1
50 TABLET ORAL DAILY
COMMUNITY

## 2020-11-24 RX ORDER — ATORVASTATIN CALCIUM 20 MG/1
20 TABLET, FILM COATED ORAL DAILY
COMMUNITY

## 2020-11-24 RX ORDER — LEVOTHYROXINE SODIUM 0.05 MG/1
50 TABLET ORAL DAILY
Status: DISCONTINUED | OUTPATIENT
Start: 2020-11-24 | End: 2020-12-01

## 2020-11-24 RX ORDER — PRENATAL VIT NO.126/IRON/FOLIC 28MG-0.8MG
1 TABLET ORAL DAILY
COMMUNITY
End: 2021-11-02

## 2020-11-24 RX ORDER — CLOPIDOGREL BISULFATE 75 MG/1
75 TABLET ORAL DAILY
Status: DISCONTINUED | OUTPATIENT
Start: 2020-11-24 | End: 2020-11-27

## 2020-11-24 RX ADMIN — TRAMADOL HYDROCHLORIDE 50 MG: 50 TABLET, FILM COATED ORAL at 13:28

## 2020-11-24 RX ADMIN — SODIUM CHLORIDE 75 ML/HR: 9 INJECTION, SOLUTION INTRAVENOUS at 04:38

## 2020-11-24 RX ADMIN — VANCOMYCIN HYDROCHLORIDE 1000 MG: 1 INJECTION, SOLUTION INTRAVENOUS at 20:14

## 2020-11-24 RX ADMIN — FAMOTIDINE 20 MG: 20 TABLET, FILM COATED ORAL at 17:19

## 2020-11-24 RX ADMIN — INSULIN LISPRO 7 UNITS: 100 INJECTION, SOLUTION INTRAVENOUS; SUBCUTANEOUS at 17:19

## 2020-11-24 RX ADMIN — SODIUM CHLORIDE 75 ML/HR: 9 INJECTION, SOLUTION INTRAVENOUS at 16:55

## 2020-11-24 RX ADMIN — INSULIN GLARGINE 10 UNITS: 100 INJECTION, SOLUTION SUBCUTANEOUS at 20:14

## 2020-11-24 RX ADMIN — DULOXETINE HYDROCHLORIDE 60 MG: 60 CAPSULE, DELAYED RELEASE ORAL at 17:19

## 2020-11-24 RX ADMIN — GABAPENTIN 400 MG: 400 CAPSULE ORAL at 22:00

## 2020-11-24 RX ADMIN — BUSPIRONE HYDROCHLORIDE 10 MG: 10 TABLET ORAL at 20:14

## 2020-11-24 RX ADMIN — LOSARTAN POTASSIUM 50 MG: 50 TABLET, FILM COATED ORAL at 17:20

## 2020-11-24 RX ADMIN — SODIUM CHLORIDE, PRESERVATIVE FREE 10 ML: 5 INJECTION INTRAVENOUS at 20:15

## 2020-11-24 RX ADMIN — TAZOBACTAM SODIUM AND PIPERACILLIN SODIUM 3.38 G: 375; 3 INJECTION, SOLUTION INTRAVENOUS at 16:55

## 2020-11-24 RX ADMIN — ERGOCALCIFEROL 50000 UNITS: 1.25 CAPSULE ORAL at 17:21

## 2020-11-24 RX ADMIN — TAZOBACTAM SODIUM AND PIPERACILLIN SODIUM 3.38 G: 375; 3 INJECTION, SOLUTION INTRAVENOUS at 22:00

## 2020-11-24 RX ADMIN — CLOPIDOGREL 75 MG: 75 TABLET, FILM COATED ORAL at 17:20

## 2020-11-24 RX ADMIN — INSULIN LISPRO 2 UNITS: 100 INJECTION, SOLUTION INTRAVENOUS; SUBCUTANEOUS at 08:58

## 2020-11-25 LAB
CREAT SERPL-MCNC: 0.75 MG/DL (ref 0.57–1)
GFR SERPL CREATININE-BSD FRML MDRD: 76 ML/MIN/1.73
GFR SERPL CREATININE-BSD FRML MDRD: 92 ML/MIN/1.73
GLUCOSE BLDC GLUCOMTR-MCNC: 134 MG/DL (ref 70–130)
GLUCOSE BLDC GLUCOMTR-MCNC: 151 MG/DL (ref 70–130)
GLUCOSE BLDC GLUCOMTR-MCNC: 164 MG/DL (ref 70–130)
GLUCOSE BLDC GLUCOMTR-MCNC: 310 MG/DL (ref 70–130)

## 2020-11-25 PROCEDURE — 63710000001 INSULIN LISPRO (HUMAN) PER 5 UNITS: Performed by: NURSE PRACTITIONER

## 2020-11-25 PROCEDURE — 82962 GLUCOSE BLOOD TEST: CPT

## 2020-11-25 PROCEDURE — 63710000001 INSULIN GLARGINE PER 5 UNITS: Performed by: HOSPITALIST

## 2020-11-25 PROCEDURE — 82565 ASSAY OF CREATININE: CPT | Performed by: HOSPITALIST

## 2020-11-25 PROCEDURE — 25010000002 PIPERACILLIN SOD-TAZOBACTAM PER 1 G: Performed by: HOSPITALIST

## 2020-11-25 RX ADMIN — FAMOTIDINE 20 MG: 20 TABLET, FILM COATED ORAL at 17:48

## 2020-11-25 RX ADMIN — SODIUM CHLORIDE 75 ML/HR: 9 INJECTION, SOLUTION INTRAVENOUS at 13:57

## 2020-11-25 RX ADMIN — ATORVASTATIN CALCIUM 20 MG: 20 TABLET, FILM COATED ORAL at 09:04

## 2020-11-25 RX ADMIN — BUSPIRONE HYDROCHLORIDE 10 MG: 10 TABLET ORAL at 09:04

## 2020-11-25 RX ADMIN — SODIUM CHLORIDE, PRESERVATIVE FREE 10 ML: 5 INJECTION INTRAVENOUS at 21:16

## 2020-11-25 RX ADMIN — GABAPENTIN 400 MG: 400 CAPSULE ORAL at 21:16

## 2020-11-25 RX ADMIN — ACETAMINOPHEN 650 MG: 325 TABLET, FILM COATED ORAL at 13:57

## 2020-11-25 RX ADMIN — CLOPIDOGREL 75 MG: 75 TABLET, FILM COATED ORAL at 09:04

## 2020-11-25 RX ADMIN — INSULIN LISPRO 2 UNITS: 100 INJECTION, SOLUTION INTRAVENOUS; SUBCUTANEOUS at 09:04

## 2020-11-25 RX ADMIN — TAZOBACTAM SODIUM AND PIPERACILLIN SODIUM 3.38 G: 375; 3 INJECTION, SOLUTION INTRAVENOUS at 14:00

## 2020-11-25 RX ADMIN — BUSPIRONE HYDROCHLORIDE 10 MG: 10 TABLET ORAL at 21:16

## 2020-11-25 RX ADMIN — DULOXETINE HYDROCHLORIDE 60 MG: 60 CAPSULE, DELAYED RELEASE ORAL at 09:04

## 2020-11-25 RX ADMIN — LEVOTHYROXINE SODIUM 50 MCG: 50 TABLET ORAL at 05:35

## 2020-11-25 RX ADMIN — ACETAMINOPHEN 650 MG: 325 TABLET, FILM COATED ORAL at 05:35

## 2020-11-25 RX ADMIN — FAMOTIDINE 20 MG: 20 TABLET, FILM COATED ORAL at 09:04

## 2020-11-25 RX ADMIN — INSULIN GLARGINE 10 UNITS: 100 INJECTION, SOLUTION SUBCUTANEOUS at 21:15

## 2020-11-25 RX ADMIN — LOSARTAN POTASSIUM 50 MG: 50 TABLET, FILM COATED ORAL at 09:04

## 2020-11-25 RX ADMIN — GABAPENTIN 400 MG: 400 CAPSULE ORAL at 05:35

## 2020-11-25 RX ADMIN — INSULIN LISPRO 7 UNITS: 100 INJECTION, SOLUTION INTRAVENOUS; SUBCUTANEOUS at 17:48

## 2020-11-25 RX ADMIN — ACETAMINOPHEN 650 MG: 325 TABLET, FILM COATED ORAL at 17:48

## 2020-11-25 RX ADMIN — TAZOBACTAM SODIUM AND PIPERACILLIN SODIUM 3.38 G: 375; 3 INJECTION, SOLUTION INTRAVENOUS at 05:35

## 2020-11-25 RX ADMIN — GABAPENTIN 400 MG: 400 CAPSULE ORAL at 13:57

## 2020-11-26 LAB
B PARAPERT DNA SPEC QL NAA+PROBE: NOT DETECTED
B PERT DNA SPEC QL NAA+PROBE: NOT DETECTED
C PNEUM DNA NPH QL NAA+NON-PROBE: NOT DETECTED
CREAT SERPL-MCNC: 0.57 MG/DL (ref 0.57–1)
FLUAV SUBTYP SPEC NAA+PROBE: NOT DETECTED
FLUBV RNA ISLT QL NAA+PROBE: NOT DETECTED
GFR SERPL CREATININE-BSD FRML MDRD: 104 ML/MIN/1.73
GFR SERPL CREATININE-BSD FRML MDRD: 126 ML/MIN/1.73
GLUCOSE BLDC GLUCOMTR-MCNC: 101 MG/DL (ref 70–130)
GLUCOSE BLDC GLUCOMTR-MCNC: 157 MG/DL (ref 70–130)
GLUCOSE BLDC GLUCOMTR-MCNC: 207 MG/DL (ref 70–130)
HADV DNA SPEC NAA+PROBE: NOT DETECTED
HCOV 229E RNA SPEC QL NAA+PROBE: NOT DETECTED
HCOV HKU1 RNA SPEC QL NAA+PROBE: NOT DETECTED
HCOV NL63 RNA SPEC QL NAA+PROBE: NOT DETECTED
HCOV OC43 RNA SPEC QL NAA+PROBE: NOT DETECTED
HMPV RNA NPH QL NAA+NON-PROBE: NOT DETECTED
HPIV1 RNA SPEC QL NAA+PROBE: NOT DETECTED
HPIV2 RNA SPEC QL NAA+PROBE: NOT DETECTED
HPIV3 RNA NPH QL NAA+PROBE: NOT DETECTED
HPIV4 P GENE NPH QL NAA+PROBE: NOT DETECTED
M PNEUMO IGG SER IA-ACNC: NOT DETECTED
RHINOVIRUS RNA SPEC NAA+PROBE: DETECTED
RSV RNA NPH QL NAA+NON-PROBE: NOT DETECTED
SARS-COV-2 RNA NPH QL NAA+NON-PROBE: NOT DETECTED

## 2020-11-26 PROCEDURE — 82565 ASSAY OF CREATININE: CPT | Performed by: HOSPITALIST

## 2020-11-26 PROCEDURE — 63710000001 INSULIN LISPRO (HUMAN) PER 5 UNITS: Performed by: NURSE PRACTITIONER

## 2020-11-26 PROCEDURE — 0202U NFCT DS 22 TRGT SARS-COV-2: CPT | Performed by: SURGERY

## 2020-11-26 PROCEDURE — 82962 GLUCOSE BLOOD TEST: CPT

## 2020-11-26 PROCEDURE — 63710000001 INSULIN GLARGINE PER 5 UNITS: Performed by: HOSPITALIST

## 2020-11-26 PROCEDURE — 63710000001 INSULIN LISPRO (HUMAN) PER 5 UNITS: Performed by: HOSPITALIST

## 2020-11-26 RX ADMIN — INSULIN LISPRO 4 UNITS: 100 INJECTION, SOLUTION INTRAVENOUS; SUBCUTANEOUS at 11:43

## 2020-11-26 RX ADMIN — INSULIN LISPRO 4 UNITS: 100 INJECTION, SOLUTION INTRAVENOUS; SUBCUTANEOUS at 11:44

## 2020-11-26 RX ADMIN — BUSPIRONE HYDROCHLORIDE 10 MG: 10 TABLET ORAL at 21:21

## 2020-11-26 RX ADMIN — SODIUM CHLORIDE 75 ML/HR: 9 INJECTION, SOLUTION INTRAVENOUS at 11:43

## 2020-11-26 RX ADMIN — ACETAMINOPHEN 650 MG: 325 TABLET, FILM COATED ORAL at 21:21

## 2020-11-26 RX ADMIN — DULOXETINE HYDROCHLORIDE 60 MG: 60 CAPSULE, DELAYED RELEASE ORAL at 09:01

## 2020-11-26 RX ADMIN — FAMOTIDINE 20 MG: 20 TABLET, FILM COATED ORAL at 17:03

## 2020-11-26 RX ADMIN — INSULIN LISPRO 2 UNITS: 100 INJECTION, SOLUTION INTRAVENOUS; SUBCUTANEOUS at 17:03

## 2020-11-26 RX ADMIN — LEVOTHYROXINE SODIUM 50 MCG: 50 TABLET ORAL at 09:01

## 2020-11-26 RX ADMIN — ATORVASTATIN CALCIUM 20 MG: 20 TABLET, FILM COATED ORAL at 09:01

## 2020-11-26 RX ADMIN — INSULIN GLARGINE 10 UNITS: 100 INJECTION, SOLUTION SUBCUTANEOUS at 21:21

## 2020-11-26 RX ADMIN — FAMOTIDINE 20 MG: 20 TABLET, FILM COATED ORAL at 09:01

## 2020-11-26 RX ADMIN — GABAPENTIN 400 MG: 400 CAPSULE ORAL at 15:53

## 2020-11-26 RX ADMIN — BUSPIRONE HYDROCHLORIDE 10 MG: 10 TABLET ORAL at 09:01

## 2020-11-26 RX ADMIN — ACETAMINOPHEN 650 MG: 325 TABLET, FILM COATED ORAL at 01:50

## 2020-11-26 RX ADMIN — ACETAMINOPHEN 650 MG: 325 TABLET, FILM COATED ORAL at 09:56

## 2020-11-26 RX ADMIN — GABAPENTIN 400 MG: 400 CAPSULE ORAL at 21:21

## 2020-11-26 RX ADMIN — CLOPIDOGREL 75 MG: 75 TABLET, FILM COATED ORAL at 09:01

## 2020-11-26 RX ADMIN — GABAPENTIN 400 MG: 400 CAPSULE ORAL at 09:01

## 2020-11-26 RX ADMIN — LOSARTAN POTASSIUM 50 MG: 50 TABLET, FILM COATED ORAL at 09:01

## 2020-11-27 ENCOUNTER — ANESTHESIA (OUTPATIENT)
Dept: PERIOP | Facility: HOSPITAL | Age: 75
End: 2020-11-27

## 2020-11-27 ENCOUNTER — ANESTHESIA EVENT (OUTPATIENT)
Dept: PERIOP | Facility: HOSPITAL | Age: 75
End: 2020-11-27

## 2020-11-27 LAB
ABO GROUP BLD: NORMAL
BLD GP AB SCN SERPL QL: NEGATIVE
CREAT SERPL-MCNC: 0.66 MG/DL (ref 0.57–1)
GFR SERPL CREATININE-BSD FRML MDRD: 106 ML/MIN/1.73
GFR SERPL CREATININE-BSD FRML MDRD: 88 ML/MIN/1.73
GLUCOSE BLDC GLUCOMTR-MCNC: 152 MG/DL (ref 70–130)
GLUCOSE BLDC GLUCOMTR-MCNC: 206 MG/DL (ref 70–130)
GLUCOSE BLDC GLUCOMTR-MCNC: 288 MG/DL (ref 70–130)
GLUCOSE BLDC GLUCOMTR-MCNC: 313 MG/DL (ref 70–130)
RH BLD: NEGATIVE
T&S EXPIRATION DATE: NORMAL

## 2020-11-27 PROCEDURE — 63710000001 INSULIN LISPRO (HUMAN) PER 5 UNITS: Performed by: SURGERY

## 2020-11-27 PROCEDURE — 25010000002 NEOSTIGMINE PER 0.5 MG: Performed by: NURSE ANESTHETIST, CERTIFIED REGISTERED

## 2020-11-27 PROCEDURE — 82962 GLUCOSE BLOOD TEST: CPT

## 2020-11-27 PROCEDURE — 88307 TISSUE EXAM BY PATHOLOGIST: CPT | Performed by: SURGERY

## 2020-11-27 PROCEDURE — 86850 RBC ANTIBODY SCREEN: CPT | Performed by: ANESTHESIOLOGY

## 2020-11-27 PROCEDURE — 63710000001 INSULIN GLARGINE PER 5 UNITS: Performed by: SURGERY

## 2020-11-27 PROCEDURE — 25010000003 CEFAZOLIN IN DEXTROSE 2-4 GM/100ML-% SOLUTION: Performed by: SURGERY

## 2020-11-27 PROCEDURE — 25010000002 MORPHINE PER 10 MG: Performed by: SURGERY

## 2020-11-27 PROCEDURE — 25010000003 CEFAZOLIN PER 500 MG: Performed by: SURGERY

## 2020-11-27 PROCEDURE — 25010000002 FENTANYL CITRATE (PF) 100 MCG/2ML SOLUTION: Performed by: NURSE ANESTHETIST, CERTIFIED REGISTERED

## 2020-11-27 PROCEDURE — 25010000002 PROPOFOL 10 MG/ML EMULSION: Performed by: NURSE ANESTHETIST, CERTIFIED REGISTERED

## 2020-11-27 PROCEDURE — 86901 BLOOD TYPING SEROLOGIC RH(D): CPT | Performed by: ANESTHESIOLOGY

## 2020-11-27 PROCEDURE — 25010000002 FENTANYL CITRATE (PF) 100 MCG/2ML SOLUTION: Performed by: SURGERY

## 2020-11-27 PROCEDURE — 0Y6D0Z3 DETACHMENT AT LEFT UPPER LEG, LOW, OPEN APPROACH: ICD-10-PCS | Performed by: SURGERY

## 2020-11-27 PROCEDURE — 25010000002 DEXAMETHASONE PER 1 MG: Performed by: NURSE ANESTHETIST, CERTIFIED REGISTERED

## 2020-11-27 PROCEDURE — 25010000002 PHENYLEPHRINE PER 1 ML: Performed by: NURSE ANESTHETIST, CERTIFIED REGISTERED

## 2020-11-27 PROCEDURE — 86900 BLOOD TYPING SEROLOGIC ABO: CPT | Performed by: ANESTHESIOLOGY

## 2020-11-27 PROCEDURE — 25010000002 ONDANSETRON PER 1 MG: Performed by: NURSE ANESTHETIST, CERTIFIED REGISTERED

## 2020-11-27 PROCEDURE — 25010000002 HYDRALAZINE PER 20 MG

## 2020-11-27 PROCEDURE — 25010000002 VANCOMYCIN 1 G RECONSTITUTED SOLUTION

## 2020-11-27 PROCEDURE — 25010000002 SUCCINYLCHOLINE PER 20 MG: Performed by: NURSE ANESTHETIST, CERTIFIED REGISTERED

## 2020-11-27 PROCEDURE — 25010000002 HYDROMORPHONE PER 4 MG: Performed by: SURGERY

## 2020-11-27 PROCEDURE — 25010000002 MIDAZOLAM PER 1 MG: Performed by: ANESTHESIOLOGY

## 2020-11-27 PROCEDURE — 82565 ASSAY OF CREATININE: CPT | Performed by: HOSPITALIST

## 2020-11-27 RX ORDER — ALBUTEROL SULFATE 2.5 MG/3ML
2.5 SOLUTION RESPIRATORY (INHALATION) ONCE AS NEEDED
Status: DISCONTINUED | OUTPATIENT
Start: 2020-11-27 | End: 2020-12-02 | Stop reason: HOSPADM

## 2020-11-27 RX ORDER — HYDRALAZINE HYDROCHLORIDE 20 MG/ML
5 INJECTION INTRAMUSCULAR; INTRAVENOUS
Status: DISCONTINUED | OUTPATIENT
Start: 2020-11-27 | End: 2020-11-27 | Stop reason: HOSPADM

## 2020-11-27 RX ORDER — DIPHENHYDRAMINE HCL 25 MG
25 CAPSULE ORAL
Status: DISCONTINUED | OUTPATIENT
Start: 2020-11-27 | End: 2020-11-30

## 2020-11-27 RX ORDER — PROMETHAZINE HYDROCHLORIDE 25 MG/1
25 SUPPOSITORY RECTAL ONCE AS NEEDED
Status: DISCONTINUED | OUTPATIENT
Start: 2020-11-27 | End: 2020-11-30

## 2020-11-27 RX ORDER — HYDROCODONE BITARTRATE AND ACETAMINOPHEN 5; 325 MG/1; MG/1
1 TABLET ORAL ONCE AS NEEDED
Status: DISCONTINUED | OUTPATIENT
Start: 2020-11-27 | End: 2020-11-28

## 2020-11-27 RX ORDER — ROCURONIUM BROMIDE 10 MG/ML
INJECTION, SOLUTION INTRAVENOUS AS NEEDED
Status: DISCONTINUED | OUTPATIENT
Start: 2020-11-27 | End: 2020-11-27 | Stop reason: SURG

## 2020-11-27 RX ORDER — GLYCOPYRROLATE 0.2 MG/ML
INJECTION INTRAMUSCULAR; INTRAVENOUS AS NEEDED
Status: DISCONTINUED | OUTPATIENT
Start: 2020-11-27 | End: 2020-11-27 | Stop reason: SURG

## 2020-11-27 RX ORDER — FENTANYL CITRATE 50 UG/ML
50 INJECTION, SOLUTION INTRAMUSCULAR; INTRAVENOUS
Status: DISCONTINUED | OUTPATIENT
Start: 2020-11-27 | End: 2020-11-28

## 2020-11-27 RX ORDER — CLONIDINE HYDROCHLORIDE 0.1 MG/1
0.1 TABLET ORAL ONCE
Status: DISCONTINUED | OUTPATIENT
Start: 2020-11-27 | End: 2020-11-29

## 2020-11-27 RX ORDER — LIDOCAINE HYDROCHLORIDE 20 MG/ML
INJECTION, SOLUTION INFILTRATION; PERINEURAL AS NEEDED
Status: DISCONTINUED | OUTPATIENT
Start: 2020-11-27 | End: 2020-11-27 | Stop reason: SURG

## 2020-11-27 RX ORDER — SODIUM CHLORIDE 0.9 % (FLUSH) 0.9 %
3-10 SYRINGE (ML) INJECTION AS NEEDED
Status: DISCONTINUED | OUTPATIENT
Start: 2020-11-27 | End: 2020-11-27

## 2020-11-27 RX ORDER — PROMETHAZINE HYDROCHLORIDE 25 MG/1
25 TABLET ORAL ONCE AS NEEDED
Status: DISCONTINUED | OUTPATIENT
Start: 2020-11-27 | End: 2020-11-30

## 2020-11-27 RX ORDER — SUCCINYLCHOLINE CHLORIDE 20 MG/ML
INJECTION INTRAMUSCULAR; INTRAVENOUS AS NEEDED
Status: DISCONTINUED | OUTPATIENT
Start: 2020-11-27 | End: 2020-11-27 | Stop reason: SURG

## 2020-11-27 RX ORDER — MORPHINE SULFATE 2 MG/ML
2 INJECTION, SOLUTION INTRAMUSCULAR; INTRAVENOUS
Status: DISCONTINUED | OUTPATIENT
Start: 2020-11-27 | End: 2020-11-28

## 2020-11-27 RX ORDER — PROPOFOL 10 MG/ML
VIAL (ML) INTRAVENOUS AS NEEDED
Status: DISCONTINUED | OUTPATIENT
Start: 2020-11-27 | End: 2020-11-27 | Stop reason: SURG

## 2020-11-27 RX ORDER — ONDANSETRON 2 MG/ML
4 INJECTION INTRAMUSCULAR; INTRAVENOUS ONCE AS NEEDED
Status: DISCONTINUED | OUTPATIENT
Start: 2020-11-27 | End: 2020-11-30

## 2020-11-27 RX ORDER — LABETALOL HYDROCHLORIDE 5 MG/ML
5 INJECTION, SOLUTION INTRAVENOUS
Status: DISCONTINUED | OUTPATIENT
Start: 2020-11-27 | End: 2020-11-30

## 2020-11-27 RX ORDER — ONDANSETRON 2 MG/ML
INJECTION INTRAMUSCULAR; INTRAVENOUS AS NEEDED
Status: DISCONTINUED | OUTPATIENT
Start: 2020-11-27 | End: 2020-11-27 | Stop reason: SURG

## 2020-11-27 RX ORDER — SODIUM CHLORIDE, SODIUM LACTATE, POTASSIUM CHLORIDE, CALCIUM CHLORIDE 600; 310; 30; 20 MG/100ML; MG/100ML; MG/100ML; MG/100ML
9 INJECTION, SOLUTION INTRAVENOUS CONTINUOUS
Status: DISCONTINUED | OUTPATIENT
Start: 2020-11-27 | End: 2020-11-28

## 2020-11-27 RX ORDER — CEFAZOLIN SODIUM 2 G/100ML
2 INJECTION, SOLUTION INTRAVENOUS
Status: COMPLETED | OUTPATIENT
Start: 2020-11-27 | End: 2020-11-27

## 2020-11-27 RX ORDER — FAMOTIDINE 10 MG/ML
20 INJECTION, SOLUTION INTRAVENOUS ONCE
Status: COMPLETED | OUTPATIENT
Start: 2020-11-27 | End: 2020-11-27

## 2020-11-27 RX ORDER — SODIUM CHLORIDE 0.9 % (FLUSH) 0.9 %
3 SYRINGE (ML) INJECTION EVERY 12 HOURS SCHEDULED
Status: DISCONTINUED | OUTPATIENT
Start: 2020-11-27 | End: 2020-11-27

## 2020-11-27 RX ORDER — FENTANYL CITRATE 50 UG/ML
INJECTION, SOLUTION INTRAMUSCULAR; INTRAVENOUS AS NEEDED
Status: DISCONTINUED | OUTPATIENT
Start: 2020-11-27 | End: 2020-11-27 | Stop reason: SURG

## 2020-11-27 RX ORDER — HYDROCODONE BITARTRATE AND ACETAMINOPHEN 5; 325 MG/1; MG/1
1 TABLET ORAL EVERY 4 HOURS PRN
Status: DISCONTINUED | OUTPATIENT
Start: 2020-11-27 | End: 2020-11-28

## 2020-11-27 RX ORDER — MIDAZOLAM HYDROCHLORIDE 1 MG/ML
1 INJECTION INTRAMUSCULAR; INTRAVENOUS
Status: COMPLETED | OUTPATIENT
Start: 2020-11-27 | End: 2020-11-27

## 2020-11-27 RX ORDER — LIDOCAINE HYDROCHLORIDE 10 MG/ML
0.5 INJECTION, SOLUTION EPIDURAL; INFILTRATION; INTRACAUDAL; PERINEURAL ONCE AS NEEDED
Status: DISCONTINUED | OUTPATIENT
Start: 2020-11-27 | End: 2020-11-27

## 2020-11-27 RX ORDER — NALOXONE HCL 0.4 MG/ML
0.2 VIAL (ML) INJECTION AS NEEDED
Status: DISCONTINUED | OUTPATIENT
Start: 2020-11-27 | End: 2020-11-30

## 2020-11-27 RX ORDER — FENTANYL CITRATE 50 UG/ML
50 INJECTION, SOLUTION INTRAMUSCULAR; INTRAVENOUS
Status: DISCONTINUED | OUTPATIENT
Start: 2020-11-27 | End: 2020-11-27

## 2020-11-27 RX ORDER — HYDROMORPHONE HYDROCHLORIDE 1 MG/ML
0.25 INJECTION, SOLUTION INTRAMUSCULAR; INTRAVENOUS; SUBCUTANEOUS
Status: DISCONTINUED | OUTPATIENT
Start: 2020-11-27 | End: 2020-11-27

## 2020-11-27 RX ORDER — FLUMAZENIL 0.1 MG/ML
0.2 INJECTION INTRAVENOUS AS NEEDED
Status: DISCONTINUED | OUTPATIENT
Start: 2020-11-27 | End: 2020-11-30

## 2020-11-27 RX ORDER — EPHEDRINE SULFATE 50 MG/ML
5 INJECTION, SOLUTION INTRAVENOUS ONCE AS NEEDED
Status: DISCONTINUED | OUTPATIENT
Start: 2020-11-27 | End: 2020-11-30

## 2020-11-27 RX ORDER — DEXAMETHASONE SODIUM PHOSPHATE 10 MG/ML
INJECTION INTRAMUSCULAR; INTRAVENOUS AS NEEDED
Status: DISCONTINUED | OUTPATIENT
Start: 2020-11-27 | End: 2020-11-27 | Stop reason: SURG

## 2020-11-27 RX ORDER — OXYCODONE AND ACETAMINOPHEN 7.5; 325 MG/1; MG/1
1 TABLET ORAL ONCE AS NEEDED
Status: DISCONTINUED | OUTPATIENT
Start: 2020-11-27 | End: 2020-11-28

## 2020-11-27 RX ORDER — HYDRALAZINE HYDROCHLORIDE 20 MG/ML
INJECTION INTRAMUSCULAR; INTRAVENOUS
Status: COMPLETED
Start: 2020-11-27 | End: 2020-11-27

## 2020-11-27 RX ORDER — DIPHENHYDRAMINE HYDROCHLORIDE 50 MG/ML
12.5 INJECTION INTRAMUSCULAR; INTRAVENOUS
Status: DISCONTINUED | OUTPATIENT
Start: 2020-11-27 | End: 2020-11-30

## 2020-11-27 RX ADMIN — FENTANYL CITRATE 50 MCG: 50 INJECTION, SOLUTION INTRAMUSCULAR; INTRAVENOUS at 09:54

## 2020-11-27 RX ADMIN — NEOSTIGMINE METHYLSULFATE 2 MG: 1 INJECTION INTRAMUSCULAR; INTRAVENOUS; SUBCUTANEOUS at 08:27

## 2020-11-27 RX ADMIN — ROCURONIUM BROMIDE 5 MG: 10 INJECTION INTRAVENOUS at 07:31

## 2020-11-27 RX ADMIN — DEXAMETHASONE SODIUM PHOSPHATE 4 MG: 10 INJECTION INTRAMUSCULAR; INTRAVENOUS at 08:01

## 2020-11-27 RX ADMIN — INSULIN LISPRO 4 UNITS: 100 INJECTION, SOLUTION INTRAVENOUS; SUBCUTANEOUS at 16:50

## 2020-11-27 RX ADMIN — FAMOTIDINE 20 MG: 10 INJECTION INTRAVENOUS at 07:01

## 2020-11-27 RX ADMIN — ONDANSETRON HYDROCHLORIDE 4 MG: 2 SOLUTION INTRAMUSCULAR; INTRAVENOUS at 08:24

## 2020-11-27 RX ADMIN — LABETALOL HYDROCHLORIDE 5 MG: 5 INJECTION, SOLUTION INTRAVENOUS at 09:48

## 2020-11-27 RX ADMIN — LIDOCAINE HYDROCHLORIDE 60 MG: 20 INJECTION, SOLUTION INFILTRATION; PERINEURAL at 07:31

## 2020-11-27 RX ADMIN — MORPHINE SULFATE 2 MG: 2 INJECTION, SOLUTION INTRAMUSCULAR; INTRAVENOUS at 11:51

## 2020-11-27 RX ADMIN — FAMOTIDINE 20 MG: 20 TABLET, FILM COATED ORAL at 13:21

## 2020-11-27 RX ADMIN — GLYCOPYRROLATE 0.4 MG: 0.2 INJECTION INTRAMUSCULAR; INTRAVENOUS at 08:25

## 2020-11-27 RX ADMIN — SUCCINYLCHOLINE CHLORIDE 100 MG: 20 INJECTION, SOLUTION INTRAMUSCULAR; INTRAVENOUS; PARENTERAL at 07:31

## 2020-11-27 RX ADMIN — INSULIN LISPRO 7 UNITS: 100 INJECTION, SOLUTION INTRAVENOUS; SUBCUTANEOUS at 11:48

## 2020-11-27 RX ADMIN — DULOXETINE HYDROCHLORIDE 60 MG: 60 CAPSULE, DELAYED RELEASE ORAL at 11:48

## 2020-11-27 RX ADMIN — FENTANYL CITRATE 50 MCG: 50 INJECTION, SOLUTION INTRAMUSCULAR; INTRAVENOUS at 09:15

## 2020-11-27 RX ADMIN — GABAPENTIN 400 MG: 400 CAPSULE ORAL at 11:48

## 2020-11-27 RX ADMIN — LOSARTAN POTASSIUM 50 MG: 50 TABLET, FILM COATED ORAL at 11:48

## 2020-11-27 RX ADMIN — MIDAZOLAM 0.5 MG: 1 INJECTION INTRAMUSCULAR; INTRAVENOUS at 07:10

## 2020-11-27 RX ADMIN — MIDAZOLAM 0.5 MG: 1 INJECTION INTRAMUSCULAR; INTRAVENOUS at 07:00

## 2020-11-27 RX ADMIN — GABAPENTIN 400 MG: 400 CAPSULE ORAL at 20:57

## 2020-11-27 RX ADMIN — HYDRALAZINE HYDROCHLORIDE 5 MG: 20 INJECTION INTRAMUSCULAR; INTRAVENOUS at 10:43

## 2020-11-27 RX ADMIN — HYDROMORPHONE HYDROCHLORIDE 0.25 MG: 1 INJECTION, SOLUTION INTRAMUSCULAR; INTRAVENOUS; SUBCUTANEOUS at 15:30

## 2020-11-27 RX ADMIN — SODIUM CHLORIDE, PRESERVATIVE FREE 10 ML: 5 INJECTION INTRAVENOUS at 20:57

## 2020-11-27 RX ADMIN — FENTANYL CITRATE 50 MCG: 50 INJECTION, SOLUTION INTRAMUSCULAR; INTRAVENOUS at 09:24

## 2020-11-27 RX ADMIN — INSULIN GLARGINE 10 UNITS: 100 INJECTION, SOLUTION SUBCUTANEOUS at 21:50

## 2020-11-27 RX ADMIN — FENTANYL CITRATE 50 MCG: 50 INJECTION, SOLUTION INTRAMUSCULAR; INTRAVENOUS at 10:11

## 2020-11-27 RX ADMIN — INSULIN LISPRO 4 UNITS: 100 INJECTION, SOLUTION INTRAVENOUS; SUBCUTANEOUS at 11:50

## 2020-11-27 RX ADMIN — HYDROCODONE BITARTRATE AND ACETAMINOPHEN 1 TABLET: 5; 325 TABLET ORAL at 20:57

## 2020-11-27 RX ADMIN — LABETALOL HYDROCHLORIDE 5 MG: 5 INJECTION, SOLUTION INTRAVENOUS at 09:05

## 2020-11-27 RX ADMIN — MORPHINE SULFATE 2 MG: 2 INJECTION, SOLUTION INTRAMUSCULAR; INTRAVENOUS at 23:05

## 2020-11-27 RX ADMIN — HYDROCODONE BITARTRATE AND ACETAMINOPHEN 1 TABLET: 5; 325 TABLET ORAL at 13:21

## 2020-11-27 RX ADMIN — ATORVASTATIN CALCIUM 20 MG: 20 TABLET, FILM COATED ORAL at 11:50

## 2020-11-27 RX ADMIN — SODIUM CHLORIDE, POTASSIUM CHLORIDE, SODIUM LACTATE AND CALCIUM CHLORIDE 9 ML/HR: 600; 310; 30; 20 INJECTION, SOLUTION INTRAVENOUS at 07:00

## 2020-11-27 RX ADMIN — HYDROMORPHONE HYDROCHLORIDE 0.25 MG: 1 INJECTION, SOLUTION INTRAMUSCULAR; INTRAVENOUS; SUBCUTANEOUS at 09:35

## 2020-11-27 RX ADMIN — FENTANYL CITRATE 50 MCG: 50 INJECTION INTRAMUSCULAR; INTRAVENOUS at 07:56

## 2020-11-27 RX ADMIN — ROCURONIUM BROMIDE 25 MG: 10 INJECTION INTRAVENOUS at 07:37

## 2020-11-27 RX ADMIN — PROPOFOL 100 MG: 10 INJECTION, EMULSION INTRAVENOUS at 07:31

## 2020-11-27 RX ADMIN — SODIUM CHLORIDE 75 ML/HR: 9 INJECTION, SOLUTION INTRAVENOUS at 18:21

## 2020-11-27 RX ADMIN — CEFAZOLIN SODIUM 2 G: 2 INJECTION, SOLUTION INTRAVENOUS at 07:27

## 2020-11-27 RX ADMIN — FENTANYL CITRATE 50 MCG: 50 INJECTION INTRAMUSCULAR; INTRAVENOUS at 07:28

## 2020-11-27 RX ADMIN — LABETALOL HYDROCHLORIDE 5 MG: 5 INJECTION, SOLUTION INTRAVENOUS at 10:16

## 2020-11-27 RX ADMIN — HYDROMORPHONE HYDROCHLORIDE 0.25 MG: 1 INJECTION, SOLUTION INTRAMUSCULAR; INTRAVENOUS; SUBCUTANEOUS at 09:47

## 2020-11-27 RX ADMIN — FAMOTIDINE 20 MG: 20 TABLET, FILM COATED ORAL at 16:57

## 2020-11-27 RX ADMIN — BUSPIRONE HYDROCHLORIDE 10 MG: 10 TABLET ORAL at 14:25

## 2020-11-27 RX ADMIN — PHENYLEPHRINE HYDROCHLORIDE 150 MCG: 10 INJECTION INTRAVENOUS at 08:26

## 2020-11-27 RX ADMIN — GLYCOPYRROLATE 0.2 MG: 0.2 INJECTION INTRAMUSCULAR; INTRAVENOUS at 07:30

## 2020-11-27 RX ADMIN — LABETALOL HYDROCHLORIDE 5 MG: 5 INJECTION, SOLUTION INTRAVENOUS at 09:32

## 2020-11-27 NOTE — ANESTHESIA POSTPROCEDURE EVALUATION
"Patient: Gwendolyn Mcwilliams    Procedure Summary     Date: 11/27/20 Room / Location: [facility] OR  / [facility] MAIN OR    Anesthesia Start: 0722 Anesthesia Stop: 0848    Procedure: AMPUTATION ABOVE KNEE (Left Thigh) Diagnosis:     Surgeon: Tanisha Romero Jr., MD Provider: Jamel Zacarias MD    Anesthesia Type: general ASA Status: 3          Anesthesia Type: general    Vitals  Vitals Value Taken Time   /92 11/27/20 1016   Temp 36.3 °C (97.4 °F) 11/27/20 1005   Pulse 83 11/27/20 1028   Resp 16 11/27/20 1005   SpO2 100 % 11/27/20 1028   Vitals shown include unvalidated device data.        Post Anesthesia Care and Evaluation    Patient location during evaluation: PACU  Patient participation: complete - patient participated  Level of consciousness: awake  Pain management: adequate  Airway patency: patent  Anesthetic complications: No anesthetic complications    Cardiovascular status: acceptable and hypertensive (@ preop baseline)  Respiratory status: acceptable  Hydration status: acceptable    Comments: BP (!) 213/92   Pulse 81   Temp 36.3 °C (97.4 °F) (Oral)   Resp 16   Ht 162.6 cm (64.02\")   Wt 41.6 kg (91 lb 11.2 oz)   SpO2 100%   BMI 15.73 kg/m²             "

## 2020-11-27 NOTE — ANESTHESIA PROCEDURE NOTES
Airway  Urgency: elective    Date/Time: 11/27/2020 7:34 AM  Airway not difficult    General Information and Staff    Patient location during procedure: OR  Anesthesiologist: Jamel Zacarias MD  CRNA: Manny De La Cruz CRNA    Indications and Patient Condition  Indications for airway management: airway protection    Preoxygenated: yes  MILS maintained throughout  Mask difficulty assessment: 1 - vent by mask    Final Airway Details  Final airway type: endotracheal airway      Successful airway: ETT  Cuffed: yes   Successful intubation technique: direct laryngoscopy  Facilitating devices/methods: cricoid pressure  Endotracheal tube insertion site: oral  Blade: Afia  Blade size: 3  ETT size (mm): 7.0  Cormack-Lehane Classification: grade I - full view of glottis  Placement verified by: chest auscultation and capnometry   Inital cuff pressure (cm H2O): 22  Cuff volume (mL): 7  Measured from: lips  ETT/EBT  to lips (cm): 21  Number of attempts at approach: 1

## 2020-11-27 NOTE — ANESTHESIA PREPROCEDURE EVALUATION
Anesthesia Evaluation     Patient summary reviewed and Nursing notes reviewed                Airway   Mallampati: II  TM distance: >3 FB  Neck ROM: full  No difficulty expected  Dental - normal exam     Pulmonary - normal exam   Cardiovascular - normal exam    (+) hypertension less than 2 medications, PVD, hyperlipidemia,       Neuro/Psych  (+) psychiatric history Depression and Anxiety,     GI/Hepatic/Renal/Endo    (+)  GERD,  diabetes mellitus type 2 using insulin, thyroid problem hypothyroidism    ROS Comment: Dysphagia    Musculoskeletal         ROS comment: Hx of right BKA/left leg cellulitis  Abdominal  - normal exam   Substance History      OB/GYN          Other                        Anesthesia Plan    ASA 3     general     intravenous induction     Anesthetic plan, all risks, benefits, and alternatives have been provided, discussed and informed consent has been obtained with: patient.    Plan discussed with CRNA.

## 2020-11-28 LAB
ALBUMIN SERPL-MCNC: 3.5 G/DL (ref 3.5–5.2)
ALBUMIN/GLOB SERPL: 0.9 G/DL
ALP SERPL-CCNC: 57 U/L (ref 39–117)
ALT SERPL W P-5'-P-CCNC: 13 U/L (ref 1–33)
ANION GAP SERPL CALCULATED.3IONS-SCNC: 10.1 MMOL/L (ref 5–15)
AST SERPL-CCNC: 23 U/L (ref 1–32)
BASOPHILS # BLD AUTO: 0.04 10*3/MM3 (ref 0–0.2)
BASOPHILS NFR BLD AUTO: 0.3 % (ref 0–1.5)
BILIRUB SERPL-MCNC: 0.4 MG/DL (ref 0–1.2)
BUN SERPL-MCNC: 7 MG/DL (ref 8–23)
BUN/CREAT SERPL: 13 (ref 7–25)
CALCIUM SPEC-SCNC: 8.8 MG/DL (ref 8.6–10.5)
CHLORIDE SERPL-SCNC: 101 MMOL/L (ref 98–107)
CO2 SERPL-SCNC: 26.9 MMOL/L (ref 22–29)
CREAT SERPL-MCNC: 0.54 MG/DL (ref 0.57–1)
DEPRECATED RDW RBC AUTO: 45.2 FL (ref 37–54)
EOSINOPHIL # BLD AUTO: 0.02 10*3/MM3 (ref 0–0.4)
EOSINOPHIL NFR BLD AUTO: 0.1 % (ref 0.3–6.2)
ERYTHROCYTE [DISTWIDTH] IN BLOOD BY AUTOMATED COUNT: 13.8 % (ref 12.3–15.4)
GFR SERPL CREATININE-BSD FRML MDRD: 111 ML/MIN/1.73
GFR SERPL CREATININE-BSD FRML MDRD: 134 ML/MIN/1.73
GLOBULIN UR ELPH-MCNC: 3.8 GM/DL
GLUCOSE BLDC GLUCOMTR-MCNC: 179 MG/DL (ref 70–130)
GLUCOSE BLDC GLUCOMTR-MCNC: 237 MG/DL (ref 70–130)
GLUCOSE BLDC GLUCOMTR-MCNC: 246 MG/DL (ref 70–130)
GLUCOSE BLDC GLUCOMTR-MCNC: 299 MG/DL (ref 70–130)
GLUCOSE SERPL-MCNC: 236 MG/DL (ref 65–99)
HCT VFR BLD AUTO: 33 % (ref 34–46.6)
HGB BLD-MCNC: 10.5 G/DL (ref 12–15.9)
IMM GRANULOCYTES # BLD AUTO: 0.09 10*3/MM3 (ref 0–0.05)
IMM GRANULOCYTES NFR BLD AUTO: 0.6 % (ref 0–0.5)
LYMPHOCYTES # BLD AUTO: 2.48 10*3/MM3 (ref 0.7–3.1)
LYMPHOCYTES NFR BLD AUTO: 16.1 % (ref 19.6–45.3)
MCH RBC QN AUTO: 28.4 PG (ref 26.6–33)
MCHC RBC AUTO-ENTMCNC: 31.8 G/DL (ref 31.5–35.7)
MCV RBC AUTO: 89.2 FL (ref 79–97)
MONOCYTES # BLD AUTO: 1.88 10*3/MM3 (ref 0.1–0.9)
MONOCYTES NFR BLD AUTO: 12.2 % (ref 5–12)
NEUTROPHILS NFR BLD AUTO: 10.93 10*3/MM3 (ref 1.7–7)
NEUTROPHILS NFR BLD AUTO: 70.7 % (ref 42.7–76)
NRBC BLD AUTO-RTO: 0 /100 WBC (ref 0–0.2)
PLATELET # BLD AUTO: 419 10*3/MM3 (ref 140–450)
PMV BLD AUTO: 9.8 FL (ref 6–12)
POTASSIUM SERPL-SCNC: 3.7 MMOL/L (ref 3.5–5.2)
PROT SERPL-MCNC: 7.3 G/DL (ref 6–8.5)
QT INTERVAL: 383 MS
RBC # BLD AUTO: 3.7 10*6/MM3 (ref 3.77–5.28)
SODIUM SERPL-SCNC: 138 MMOL/L (ref 136–145)
T4 FREE SERPL-MCNC: 1.3 NG/DL (ref 0.93–1.7)
TROPONIN T SERPL-MCNC: <0.01 NG/ML (ref 0–0.03)
TSH SERPL DL<=0.05 MIU/L-ACNC: 0.05 UIU/ML (ref 0.27–4.2)
WBC # BLD AUTO: 15.44 10*3/MM3 (ref 3.4–10.8)

## 2020-11-28 PROCEDURE — 93010 ELECTROCARDIOGRAM REPORT: CPT | Performed by: INTERNAL MEDICINE

## 2020-11-28 PROCEDURE — 84439 ASSAY OF FREE THYROXINE: CPT | Performed by: HOSPITALIST

## 2020-11-28 PROCEDURE — 25010000002 MORPHINE PER 10 MG: Performed by: SURGERY

## 2020-11-28 PROCEDURE — 93005 ELECTROCARDIOGRAM TRACING: CPT | Performed by: HOSPITALIST

## 2020-11-28 PROCEDURE — 82962 GLUCOSE BLOOD TEST: CPT

## 2020-11-28 PROCEDURE — 25010000002 ENOXAPARIN PER 10 MG: Performed by: HOSPITALIST

## 2020-11-28 PROCEDURE — 25010000002 HYDROMORPHONE PER 4 MG: Performed by: SURGERY

## 2020-11-28 PROCEDURE — 84443 ASSAY THYROID STIM HORMONE: CPT | Performed by: HOSPITALIST

## 2020-11-28 PROCEDURE — 84484 ASSAY OF TROPONIN QUANT: CPT | Performed by: HOSPITALIST

## 2020-11-28 PROCEDURE — 63710000001 INSULIN LISPRO (HUMAN) PER 5 UNITS: Performed by: SURGERY

## 2020-11-28 PROCEDURE — 85025 COMPLETE CBC W/AUTO DIFF WBC: CPT | Performed by: HOSPITALIST

## 2020-11-28 PROCEDURE — 80053 COMPREHEN METABOLIC PANEL: CPT | Performed by: HOSPITALIST

## 2020-11-28 PROCEDURE — 63710000001 INSULIN GLARGINE PER 5 UNITS: Performed by: SURGERY

## 2020-11-28 RX ORDER — OXYCODONE HYDROCHLORIDE 5 MG/1
5 TABLET ORAL EVERY 4 HOURS PRN
Status: DISCONTINUED | OUTPATIENT
Start: 2020-11-28 | End: 2020-12-02 | Stop reason: HOSPADM

## 2020-11-28 RX ORDER — HYDROMORPHONE HYDROCHLORIDE 1 MG/ML
0.25 INJECTION, SOLUTION INTRAMUSCULAR; INTRAVENOUS; SUBCUTANEOUS
Status: DISCONTINUED | OUTPATIENT
Start: 2020-11-28 | End: 2020-11-30

## 2020-11-28 RX ORDER — AMLODIPINE BESYLATE 2.5 MG/1
2.5 TABLET ORAL
Status: DISCONTINUED | OUTPATIENT
Start: 2020-11-28 | End: 2020-12-02 | Stop reason: HOSPADM

## 2020-11-28 RX ORDER — ACETAMINOPHEN 500 MG
1000 TABLET ORAL EVERY 6 HOURS SCHEDULED
Status: DISCONTINUED | OUTPATIENT
Start: 2020-11-28 | End: 2020-12-02 | Stop reason: HOSPADM

## 2020-11-28 RX ORDER — POLYETHYLENE GLYCOL 3350 17 G/17G
17 POWDER, FOR SOLUTION ORAL DAILY
Status: DISCONTINUED | OUTPATIENT
Start: 2020-11-28 | End: 2020-12-02 | Stop reason: HOSPADM

## 2020-11-28 RX ADMIN — INSULIN GLARGINE 10 UNITS: 100 INJECTION, SOLUTION SUBCUTANEOUS at 21:48

## 2020-11-28 RX ADMIN — MORPHINE SULFATE 2 MG: 2 INJECTION, SOLUTION INTRAMUSCULAR; INTRAVENOUS at 06:12

## 2020-11-28 RX ADMIN — GABAPENTIN 400 MG: 400 CAPSULE ORAL at 11:55

## 2020-11-28 RX ADMIN — LOSARTAN POTASSIUM 50 MG: 50 TABLET, FILM COATED ORAL at 08:23

## 2020-11-28 RX ADMIN — ACETAMINOPHEN 1000 MG: 500 TABLET, FILM COATED ORAL at 17:32

## 2020-11-28 RX ADMIN — OXYCODONE HYDROCHLORIDE 5 MG: 5 TABLET ORAL at 11:55

## 2020-11-28 RX ADMIN — HYDROCODONE BITARTRATE AND ACETAMINOPHEN 1 TABLET: 5; 325 TABLET ORAL at 08:24

## 2020-11-28 RX ADMIN — GABAPENTIN 400 MG: 400 CAPSULE ORAL at 19:37

## 2020-11-28 RX ADMIN — LEVOTHYROXINE SODIUM 50 MCG: 50 TABLET ORAL at 06:13

## 2020-11-28 RX ADMIN — ATORVASTATIN CALCIUM 20 MG: 20 TABLET, FILM COATED ORAL at 08:24

## 2020-11-28 RX ADMIN — INSULIN LISPRO 4 UNITS: 100 INJECTION, SOLUTION INTRAVENOUS; SUBCUTANEOUS at 11:56

## 2020-11-28 RX ADMIN — OXYCODONE HYDROCHLORIDE 5 MG: 5 TABLET ORAL at 17:32

## 2020-11-28 RX ADMIN — ENOXAPARIN SODIUM 30 MG: 30 INJECTION SUBCUTANEOUS at 11:55

## 2020-11-28 RX ADMIN — DULOXETINE HYDROCHLORIDE 60 MG: 60 CAPSULE, DELAYED RELEASE ORAL at 08:24

## 2020-11-28 RX ADMIN — INSULIN LISPRO 2 UNITS: 100 INJECTION, SOLUTION INTRAVENOUS; SUBCUTANEOUS at 17:32

## 2020-11-28 RX ADMIN — INSULIN LISPRO 4 UNITS: 100 INJECTION, SOLUTION INTRAVENOUS; SUBCUTANEOUS at 08:24

## 2020-11-28 RX ADMIN — BUSPIRONE HYDROCHLORIDE 10 MG: 10 TABLET ORAL at 21:48

## 2020-11-28 RX ADMIN — FAMOTIDINE 20 MG: 20 TABLET, FILM COATED ORAL at 17:32

## 2020-11-28 RX ADMIN — HYDROMORPHONE HYDROCHLORIDE 0.25 MG: 1 INJECTION, SOLUTION INTRAMUSCULAR; INTRAVENOUS; SUBCUTANEOUS at 19:36

## 2020-11-28 RX ADMIN — AMLODIPINE BESYLATE 2.5 MG: 2.5 TABLET ORAL at 13:47

## 2020-11-28 RX ADMIN — BUSPIRONE HYDROCHLORIDE 10 MG: 10 TABLET ORAL at 08:23

## 2020-11-28 RX ADMIN — HYDROMORPHONE HYDROCHLORIDE 0.25 MG: 1 INJECTION, SOLUTION INTRAMUSCULAR; INTRAVENOUS; SUBCUTANEOUS at 13:47

## 2020-11-28 RX ADMIN — MORPHINE SULFATE 2 MG: 2 INJECTION, SOLUTION INTRAMUSCULAR; INTRAVENOUS at 03:22

## 2020-11-28 RX ADMIN — GABAPENTIN 400 MG: 400 CAPSULE ORAL at 06:20

## 2020-11-28 RX ADMIN — POLYETHYLENE GLYCOL 3350 17 G: 17 POWDER, FOR SOLUTION ORAL at 11:54

## 2020-11-28 RX ADMIN — FAMOTIDINE 20 MG: 20 TABLET, FILM COATED ORAL at 08:24

## 2020-11-28 RX ADMIN — ACETAMINOPHEN 1000 MG: 500 TABLET, FILM COATED ORAL at 11:55

## 2020-11-28 RX ADMIN — INSULIN LISPRO 6 UNITS: 100 INJECTION, SOLUTION INTRAVENOUS; SUBCUTANEOUS at 11:55

## 2020-11-29 LAB
BASOPHILS # BLD AUTO: 0.05 10*3/MM3 (ref 0–0.2)
BASOPHILS NFR BLD AUTO: 0.4 % (ref 0–1.5)
DEPRECATED RDW RBC AUTO: 44.2 FL (ref 37–54)
EOSINOPHIL # BLD AUTO: 0.13 10*3/MM3 (ref 0–0.4)
EOSINOPHIL NFR BLD AUTO: 1.1 % (ref 0.3–6.2)
ERYTHROCYTE [DISTWIDTH] IN BLOOD BY AUTOMATED COUNT: 13.1 % (ref 12.3–15.4)
GLUCOSE BLDC GLUCOMTR-MCNC: 206 MG/DL (ref 70–130)
GLUCOSE BLDC GLUCOMTR-MCNC: 245 MG/DL (ref 70–130)
GLUCOSE BLDC GLUCOMTR-MCNC: 334 MG/DL (ref 70–130)
GLUCOSE BLDC GLUCOMTR-MCNC: 335 MG/DL (ref 70–130)
HCT VFR BLD AUTO: 32.9 % (ref 34–46.6)
HGB BLD-MCNC: 10.6 G/DL (ref 12–15.9)
IMM GRANULOCYTES # BLD AUTO: 0.05 10*3/MM3 (ref 0–0.05)
IMM GRANULOCYTES NFR BLD AUTO: 0.4 % (ref 0–0.5)
LYMPHOCYTES # BLD AUTO: 2.24 10*3/MM3 (ref 0.7–3.1)
LYMPHOCYTES NFR BLD AUTO: 18.7 % (ref 19.6–45.3)
MCH RBC QN AUTO: 29 PG (ref 26.6–33)
MCHC RBC AUTO-ENTMCNC: 32.2 G/DL (ref 31.5–35.7)
MCV RBC AUTO: 90.1 FL (ref 79–97)
MONOCYTES # BLD AUTO: 1.48 10*3/MM3 (ref 0.1–0.9)
MONOCYTES NFR BLD AUTO: 12.4 % (ref 5–12)
NEUTROPHILS NFR BLD AUTO: 67 % (ref 42.7–76)
NEUTROPHILS NFR BLD AUTO: 8.03 10*3/MM3 (ref 1.7–7)
NRBC BLD AUTO-RTO: 0 /100 WBC (ref 0–0.2)
PLATELET # BLD AUTO: 396 10*3/MM3 (ref 140–450)
PMV BLD AUTO: 9.7 FL (ref 6–12)
RBC # BLD AUTO: 3.65 10*6/MM3 (ref 3.77–5.28)
WBC # BLD AUTO: 11.98 10*3/MM3 (ref 3.4–10.8)

## 2020-11-29 PROCEDURE — 25010000002 HYDROMORPHONE PER 4 MG: Performed by: SURGERY

## 2020-11-29 PROCEDURE — 63710000001 INSULIN LISPRO (HUMAN) PER 5 UNITS: Performed by: SURGERY

## 2020-11-29 PROCEDURE — 25010000002 ENOXAPARIN PER 10 MG: Performed by: HOSPITALIST

## 2020-11-29 PROCEDURE — 82962 GLUCOSE BLOOD TEST: CPT

## 2020-11-29 PROCEDURE — 63710000001 INSULIN GLARGINE PER 5 UNITS: Performed by: HOSPITALIST

## 2020-11-29 PROCEDURE — 85025 COMPLETE CBC W/AUTO DIFF WBC: CPT | Performed by: SURGERY

## 2020-11-29 RX ORDER — INSULIN GLARGINE 100 [IU]/ML
12 INJECTION, SOLUTION SUBCUTANEOUS NIGHTLY
Status: DISCONTINUED | OUTPATIENT
Start: 2020-11-29 | End: 2020-11-30

## 2020-11-29 RX ADMIN — FAMOTIDINE 20 MG: 20 TABLET, FILM COATED ORAL at 17:18

## 2020-11-29 RX ADMIN — INSULIN GLARGINE 12 UNITS: 100 INJECTION, SOLUTION SUBCUTANEOUS at 21:03

## 2020-11-29 RX ADMIN — GABAPENTIN 400 MG: 400 CAPSULE ORAL at 05:52

## 2020-11-29 RX ADMIN — INSULIN LISPRO 7 UNITS: 100 INJECTION, SOLUTION INTRAVENOUS; SUBCUTANEOUS at 08:30

## 2020-11-29 RX ADMIN — ACETAMINOPHEN 1000 MG: 500 TABLET, FILM COATED ORAL at 03:03

## 2020-11-29 RX ADMIN — HYDROMORPHONE HYDROCHLORIDE 0.25 MG: 1 INJECTION, SOLUTION INTRAMUSCULAR; INTRAVENOUS; SUBCUTANEOUS at 08:40

## 2020-11-29 RX ADMIN — GABAPENTIN 400 MG: 400 CAPSULE ORAL at 21:03

## 2020-11-29 RX ADMIN — LOSARTAN POTASSIUM 50 MG: 50 TABLET, FILM COATED ORAL at 08:30

## 2020-11-29 RX ADMIN — HYDROMORPHONE HYDROCHLORIDE 0.25 MG: 1 INJECTION, SOLUTION INTRAMUSCULAR; INTRAVENOUS; SUBCUTANEOUS at 03:02

## 2020-11-29 RX ADMIN — ACETAMINOPHEN 1000 MG: 500 TABLET, FILM COATED ORAL at 17:18

## 2020-11-29 RX ADMIN — ACETAMINOPHEN 1000 MG: 500 TABLET, FILM COATED ORAL at 12:34

## 2020-11-29 RX ADMIN — FAMOTIDINE 20 MG: 20 TABLET, FILM COATED ORAL at 08:30

## 2020-11-29 RX ADMIN — ATORVASTATIN CALCIUM 20 MG: 20 TABLET, FILM COATED ORAL at 08:30

## 2020-11-29 RX ADMIN — INSULIN LISPRO 4 UNITS: 100 INJECTION, SOLUTION INTRAVENOUS; SUBCUTANEOUS at 12:35

## 2020-11-29 RX ADMIN — GABAPENTIN 400 MG: 400 CAPSULE ORAL at 12:34

## 2020-11-29 RX ADMIN — INSULIN LISPRO 4 UNITS: 100 INJECTION, SOLUTION INTRAVENOUS; SUBCUTANEOUS at 12:34

## 2020-11-29 RX ADMIN — BUSPIRONE HYDROCHLORIDE 10 MG: 10 TABLET ORAL at 08:30

## 2020-11-29 RX ADMIN — AMLODIPINE BESYLATE 2.5 MG: 2.5 TABLET ORAL at 08:30

## 2020-11-29 RX ADMIN — LEVOTHYROXINE SODIUM 50 MCG: 50 TABLET ORAL at 05:52

## 2020-11-29 RX ADMIN — BUSPIRONE HYDROCHLORIDE 10 MG: 10 TABLET ORAL at 21:04

## 2020-11-29 RX ADMIN — POLYETHYLENE GLYCOL 3350 17 G: 17 POWDER, FOR SOLUTION ORAL at 08:30

## 2020-11-29 RX ADMIN — INSULIN LISPRO 6 UNITS: 100 INJECTION, SOLUTION INTRAVENOUS; SUBCUTANEOUS at 17:17

## 2020-11-29 RX ADMIN — DULOXETINE HYDROCHLORIDE 60 MG: 60 CAPSULE, DELAYED RELEASE ORAL at 08:30

## 2020-11-29 RX ADMIN — SODIUM CHLORIDE 75 ML/HR: 9 INJECTION, SOLUTION INTRAVENOUS at 08:40

## 2020-11-29 RX ADMIN — ENOXAPARIN SODIUM 30 MG: 30 INJECTION SUBCUTANEOUS at 12:34

## 2020-11-29 RX ADMIN — OXYCODONE HYDROCHLORIDE 5 MG: 5 TABLET ORAL at 21:04

## 2020-11-30 PROBLEM — B34.8 RHINOVIRUS INFECTION: Status: ACTIVE | Noted: 2020-11-30

## 2020-11-30 PROBLEM — R63.6 UNDERWEIGHT: Status: ACTIVE | Noted: 2020-11-30

## 2020-11-30 LAB
GLUCOSE BLDC GLUCOMTR-MCNC: 227 MG/DL (ref 70–130)
GLUCOSE BLDC GLUCOMTR-MCNC: 260 MG/DL (ref 70–130)
GLUCOSE BLDC GLUCOMTR-MCNC: 268 MG/DL (ref 70–130)
GLUCOSE BLDC GLUCOMTR-MCNC: 395 MG/DL (ref 70–130)
LAB AP CASE REPORT: NORMAL
PATH REPORT.FINAL DX SPEC: NORMAL
PATH REPORT.GROSS SPEC: NORMAL

## 2020-11-30 PROCEDURE — 63710000001 INSULIN LISPRO (HUMAN) PER 5 UNITS: Performed by: HOSPITALIST

## 2020-11-30 PROCEDURE — 97535 SELF CARE MNGMENT TRAINING: CPT | Performed by: OCCUPATIONAL THERAPIST

## 2020-11-30 PROCEDURE — 63710000001 INSULIN LISPRO (HUMAN) PER 5 UNITS: Performed by: SURGERY

## 2020-11-30 PROCEDURE — 97162 PT EVAL MOD COMPLEX 30 MIN: CPT

## 2020-11-30 PROCEDURE — 97166 OT EVAL MOD COMPLEX 45 MIN: CPT | Performed by: OCCUPATIONAL THERAPIST

## 2020-11-30 PROCEDURE — 63710000001 INSULIN GLARGINE PER 5 UNITS: Performed by: HOSPITALIST

## 2020-11-30 PROCEDURE — 97530 THERAPEUTIC ACTIVITIES: CPT

## 2020-11-30 PROCEDURE — 82962 GLUCOSE BLOOD TEST: CPT

## 2020-11-30 RX ORDER — INSULIN GLARGINE 100 [IU]/ML
20 INJECTION, SOLUTION SUBCUTANEOUS NIGHTLY
Status: DISCONTINUED | OUTPATIENT
Start: 2020-11-30 | End: 2020-12-01

## 2020-11-30 RX ADMIN — DULOXETINE HYDROCHLORIDE 60 MG: 60 CAPSULE, DELAYED RELEASE ORAL at 08:32

## 2020-11-30 RX ADMIN — AMLODIPINE BESYLATE 2.5 MG: 2.5 TABLET ORAL at 08:32

## 2020-11-30 RX ADMIN — ACETAMINOPHEN 1000 MG: 500 TABLET, FILM COATED ORAL at 16:35

## 2020-11-30 RX ADMIN — INSULIN LISPRO 6 UNITS: 100 INJECTION, SOLUTION INTRAVENOUS; SUBCUTANEOUS at 12:45

## 2020-11-30 RX ADMIN — GABAPENTIN 400 MG: 400 CAPSULE ORAL at 02:54

## 2020-11-30 RX ADMIN — LEVOTHYROXINE SODIUM 50 MCG: 50 TABLET ORAL at 08:32

## 2020-11-30 RX ADMIN — FAMOTIDINE 20 MG: 20 TABLET, FILM COATED ORAL at 08:32

## 2020-11-30 RX ADMIN — INSULIN LISPRO 6 UNITS: 100 INJECTION, SOLUTION INTRAVENOUS; SUBCUTANEOUS at 08:33

## 2020-11-30 RX ADMIN — INSULIN LISPRO 6 UNITS: 100 INJECTION, SOLUTION INTRAVENOUS; SUBCUTANEOUS at 16:38

## 2020-11-30 RX ADMIN — OXYCODONE HYDROCHLORIDE 5 MG: 5 TABLET ORAL at 17:44

## 2020-11-30 RX ADMIN — ATORVASTATIN CALCIUM 20 MG: 20 TABLET, FILM COATED ORAL at 08:32

## 2020-11-30 RX ADMIN — BUSPIRONE HYDROCHLORIDE 10 MG: 10 TABLET ORAL at 08:32

## 2020-11-30 RX ADMIN — FAMOTIDINE 20 MG: 20 TABLET, FILM COATED ORAL at 16:35

## 2020-11-30 RX ADMIN — POLYETHYLENE GLYCOL 3350 17 G: 17 POWDER, FOR SOLUTION ORAL at 08:32

## 2020-11-30 RX ADMIN — OXYCODONE HYDROCHLORIDE 5 MG: 5 TABLET ORAL at 02:53

## 2020-11-30 RX ADMIN — ACETAMINOPHEN 1000 MG: 500 TABLET, FILM COATED ORAL at 08:32

## 2020-11-30 RX ADMIN — LOSARTAN POTASSIUM 50 MG: 50 TABLET, FILM COATED ORAL at 08:32

## 2020-11-30 RX ADMIN — INSULIN LISPRO 8 UNITS: 100 INJECTION, SOLUTION INTRAVENOUS; SUBCUTANEOUS at 16:38

## 2020-11-30 RX ADMIN — OXYCODONE HYDROCHLORIDE 5 MG: 5 TABLET ORAL at 22:56

## 2020-12-01 PROBLEM — L03.116 CELLULITIS OF LEFT LEG: Status: RESOLVED | Noted: 2020-11-23 | Resolved: 2020-12-01

## 2020-12-01 PROBLEM — Z89.612 S/P AKA (ABOVE KNEE AMPUTATION), LEFT (HCC): Status: ACTIVE | Noted: 2020-12-01

## 2020-12-01 LAB
ANION GAP SERPL CALCULATED.3IONS-SCNC: 10.2 MMOL/L (ref 5–15)
BUN SERPL-MCNC: 20 MG/DL (ref 8–23)
BUN/CREAT SERPL: 33.3 (ref 7–25)
CALCIUM SPEC-SCNC: 9.2 MG/DL (ref 8.6–10.5)
CHLORIDE SERPL-SCNC: 101 MMOL/L (ref 98–107)
CO2 SERPL-SCNC: 27.8 MMOL/L (ref 22–29)
CREAT SERPL-MCNC: 0.6 MG/DL (ref 0.57–1)
DEPRECATED RDW RBC AUTO: 43.1 FL (ref 37–54)
ERYTHROCYTE [DISTWIDTH] IN BLOOD BY AUTOMATED COUNT: 13.2 % (ref 12.3–15.4)
GFR SERPL CREATININE-BSD FRML MDRD: 119 ML/MIN/1.73
GFR SERPL CREATININE-BSD FRML MDRD: 98 ML/MIN/1.73
GLUCOSE BLDC GLUCOMTR-MCNC: 185 MG/DL (ref 70–130)
GLUCOSE BLDC GLUCOMTR-MCNC: 250 MG/DL (ref 70–130)
GLUCOSE BLDC GLUCOMTR-MCNC: 284 MG/DL (ref 70–130)
GLUCOSE BLDC GLUCOMTR-MCNC: 333 MG/DL (ref 70–130)
GLUCOSE SERPL-MCNC: 316 MG/DL (ref 65–99)
HCT VFR BLD AUTO: 29.8 % (ref 34–46.6)
HGB BLD-MCNC: 9.5 G/DL (ref 12–15.9)
MCH RBC QN AUTO: 28.4 PG (ref 26.6–33)
MCHC RBC AUTO-ENTMCNC: 31.9 G/DL (ref 31.5–35.7)
MCV RBC AUTO: 89.2 FL (ref 79–97)
PLATELET # BLD AUTO: 470 10*3/MM3 (ref 140–450)
PMV BLD AUTO: 9.8 FL (ref 6–12)
POTASSIUM SERPL-SCNC: 4.4 MMOL/L (ref 3.5–5.2)
RBC # BLD AUTO: 3.34 10*6/MM3 (ref 3.77–5.28)
SODIUM SERPL-SCNC: 139 MMOL/L (ref 136–145)
WBC # BLD AUTO: 10.88 10*3/MM3 (ref 3.4–10.8)

## 2020-12-01 PROCEDURE — 63710000001 INSULIN LISPRO (HUMAN) PER 5 UNITS: Performed by: HOSPITALIST

## 2020-12-01 PROCEDURE — 25010000002 ENOXAPARIN PER 10 MG: Performed by: HOSPITALIST

## 2020-12-01 PROCEDURE — 82962 GLUCOSE BLOOD TEST: CPT

## 2020-12-01 PROCEDURE — 97530 THERAPEUTIC ACTIVITIES: CPT

## 2020-12-01 PROCEDURE — 63710000001 INSULIN LISPRO (HUMAN) PER 5 UNITS: Performed by: SURGERY

## 2020-12-01 PROCEDURE — 85027 COMPLETE CBC AUTOMATED: CPT | Performed by: HOSPITALIST

## 2020-12-01 PROCEDURE — 80048 BASIC METABOLIC PNL TOTAL CA: CPT | Performed by: HOSPITALIST

## 2020-12-01 PROCEDURE — 63710000001 INSULIN GLARGINE PER 5 UNITS: Performed by: NURSE PRACTITIONER

## 2020-12-01 RX ORDER — BISACODYL 10 MG
10 SUPPOSITORY, RECTAL RECTAL DAILY PRN
Start: 2020-12-01 | End: 2021-11-02

## 2020-12-01 RX ORDER — METOCLOPRAMIDE 5 MG/1
5 TABLET ORAL 3 TIMES DAILY PRN
Start: 2020-12-01

## 2020-12-01 RX ORDER — POLYETHYLENE GLYCOL 3350 17 G/17G
17 POWDER, FOR SOLUTION ORAL DAILY
Start: 2020-12-02 | End: 2021-11-02

## 2020-12-01 RX ORDER — LEVOTHYROXINE SODIUM 0.07 MG/1
37.5 TABLET ORAL DAILY
Start: 2020-12-02

## 2020-12-01 RX ORDER — INSULIN GLARGINE 100 [IU]/ML
20 INJECTION, SOLUTION SUBCUTANEOUS NIGHTLY
Status: DISCONTINUED | OUTPATIENT
Start: 2020-12-01 | End: 2020-12-02 | Stop reason: HOSPADM

## 2020-12-01 RX ORDER — INSULIN GLARGINE 100 [IU]/ML
25 INJECTION, SOLUTION SUBCUTANEOUS NIGHTLY
Status: DISCONTINUED | OUTPATIENT
Start: 2020-12-01 | End: 2020-12-01

## 2020-12-01 RX ORDER — OXYCODONE HYDROCHLORIDE 5 MG/1
5 TABLET ORAL EVERY 6 HOURS PRN
Qty: 12 TABLET | Refills: 0 | Status: SHIPPED | OUTPATIENT
Start: 2020-12-01 | End: 2020-12-04

## 2020-12-01 RX ORDER — ACETAMINOPHEN 500 MG
1000 TABLET ORAL EVERY 6 HOURS SCHEDULED
Qty: 22 TABLET | Refills: 0 | Status: SHIPPED | OUTPATIENT
Start: 2020-12-01 | End: 2020-12-04

## 2020-12-01 RX ORDER — AMLODIPINE BESYLATE 2.5 MG/1
2.5 TABLET ORAL
Start: 2020-12-02

## 2020-12-01 RX ORDER — LEVOTHYROXINE SODIUM 0.07 MG/1
37.5 TABLET ORAL DAILY
Status: DISCONTINUED | OUTPATIENT
Start: 2020-12-02 | End: 2020-12-02 | Stop reason: HOSPADM

## 2020-12-01 RX ORDER — FAMOTIDINE 20 MG/1
20 TABLET, FILM COATED ORAL 2 TIMES DAILY
Start: 2020-12-01

## 2020-12-01 RX ADMIN — OXYCODONE HYDROCHLORIDE 5 MG: 5 TABLET ORAL at 08:23

## 2020-12-01 RX ADMIN — ACETAMINOPHEN 1000 MG: 500 TABLET, FILM COATED ORAL at 17:44

## 2020-12-01 RX ADMIN — AMLODIPINE BESYLATE 2.5 MG: 2.5 TABLET ORAL at 08:23

## 2020-12-01 RX ADMIN — INSULIN LISPRO 7 UNITS: 100 INJECTION, SOLUTION INTRAVENOUS; SUBCUTANEOUS at 17:44

## 2020-12-01 RX ADMIN — ACETAMINOPHEN 1000 MG: 500 TABLET, FILM COATED ORAL at 11:42

## 2020-12-01 RX ADMIN — INSULIN LISPRO 6 UNITS: 100 INJECTION, SOLUTION INTRAVENOUS; SUBCUTANEOUS at 08:24

## 2020-12-01 RX ADMIN — INSULIN LISPRO 2 UNITS: 100 INJECTION, SOLUTION INTRAVENOUS; SUBCUTANEOUS at 08:23

## 2020-12-01 RX ADMIN — LOSARTAN POTASSIUM 50 MG: 50 TABLET, FILM COATED ORAL at 08:23

## 2020-12-01 RX ADMIN — ACETAMINOPHEN 1000 MG: 500 TABLET, FILM COATED ORAL at 05:27

## 2020-12-01 RX ADMIN — GABAPENTIN 400 MG: 400 CAPSULE ORAL at 11:42

## 2020-12-01 RX ADMIN — OXYCODONE HYDROCHLORIDE 5 MG: 5 TABLET ORAL at 21:16

## 2020-12-01 RX ADMIN — POLYETHYLENE GLYCOL 3350 17 G: 17 POWDER, FOR SOLUTION ORAL at 08:24

## 2020-12-01 RX ADMIN — INSULIN LISPRO 6 UNITS: 100 INJECTION, SOLUTION INTRAVENOUS; SUBCUTANEOUS at 11:43

## 2020-12-01 RX ADMIN — INSULIN LISPRO 6 UNITS: 100 INJECTION, SOLUTION INTRAVENOUS; SUBCUTANEOUS at 11:42

## 2020-12-01 RX ADMIN — ENOXAPARIN SODIUM 30 MG: 30 INJECTION SUBCUTANEOUS at 11:42

## 2020-12-01 RX ADMIN — INSULIN GLARGINE 20 UNITS: 100 INJECTION, SOLUTION SUBCUTANEOUS at 20:44

## 2020-12-01 RX ADMIN — GABAPENTIN 400 MG: 400 CAPSULE ORAL at 20:44

## 2020-12-01 RX ADMIN — BUSPIRONE HYDROCHLORIDE 10 MG: 10 TABLET ORAL at 20:44

## 2020-12-01 RX ADMIN — FAMOTIDINE 20 MG: 20 TABLET, FILM COATED ORAL at 17:44

## 2020-12-01 RX ADMIN — ATORVASTATIN CALCIUM 20 MG: 20 TABLET, FILM COATED ORAL at 08:23

## 2020-12-01 RX ADMIN — INSULIN LISPRO 6 UNITS: 100 INJECTION, SOLUTION INTRAVENOUS; SUBCUTANEOUS at 17:44

## 2020-12-01 RX ADMIN — FAMOTIDINE 20 MG: 20 TABLET, FILM COATED ORAL at 08:24

## 2020-12-01 RX ADMIN — LEVOTHYROXINE SODIUM 50 MCG: 50 TABLET ORAL at 06:46

## 2020-12-01 RX ADMIN — OXYCODONE HYDROCHLORIDE 5 MG: 5 TABLET ORAL at 17:44

## 2020-12-01 RX ADMIN — ERGOCALCIFEROL 50000 UNITS: 1.25 CAPSULE ORAL at 14:49

## 2020-12-01 RX ADMIN — GABAPENTIN 400 MG: 400 CAPSULE ORAL at 05:27

## 2020-12-01 RX ADMIN — BUSPIRONE HYDROCHLORIDE 10 MG: 10 TABLET ORAL at 08:23

## 2020-12-01 RX ADMIN — DULOXETINE HYDROCHLORIDE 60 MG: 60 CAPSULE, DELAYED RELEASE ORAL at 08:23

## 2020-12-01 NOTE — PLAN OF CARE
Goal Outcome Evaluation:  Plan of Care Reviewed With: patient  Progress: no change  Outcome Summary: Pt still unable to graciela prosthetic 2/2 RLE swelling. Pt had difficulty holding herself upright at EOB for 3 min varying bet mod a and CGA. Pt long sat in bed for approx 3 min w/ SV. PT will prog as pt billie.    Patient was wearing a face mask during this therapy encounter. Therapist used appropriate personal protective equipment including eye protection, mask, and gloves.  Mask used was standard procedure mask. Appropriate PPE was worn during the entire therapy session. Hand hygiene was completed before and after therapy session. Patient is not in enhanced droplet precautions.

## 2020-12-01 NOTE — PROGRESS NOTES
[facility] Acute Inpt Rehab Note     Continue to follow.  Dr. De La Cruz aware of patient and plans to see in consultation tomorrow, 12/2.  We will wait for his assessment and direction regarding rehab needs and most appropriate level of care.    Thanks,  Ryann Fisher RN  Rehab Admission Nurse   [phone]

## 2020-12-01 NOTE — PROGRESS NOTES
Continued Stay Note  [facility]     Patient Name: Gwendolyn Mcwilliams  MRN: [id number]  Today's Date: 12/1/2020    Admit Date: 11/23/2020    Discharge Plan     Row Name 12/01/20 1601       Plan    Plan  Pt has been approved for acute rehab @ [facility] in [facility]. CCP attempted to reach family (Magly by phone) to discuss dc and transport.  CCP will follow Norma Sheets RN/CCP        Discharge Codes    No documentation.       Expected Discharge Date and Time     Expected Discharge Date Expected Discharge Time    Dec 2, 2020             Norma Sheets RN

## 2020-12-01 NOTE — NURSING NOTE
Pt refused to get blood drawn this am. Explained to her orders and why levels are check. Cont to refused. Advised  to come back later and to try again.

## 2020-12-01 NOTE — PLAN OF CARE
Goal Outcome Evaluation:  Plan of Care Reviewed With: patient  Progress: no change  Outcome Summary: Pt resting comfortably after prn oxycodone given at 2256, nsr on monitor, on ra satting at 97%. Pt , stump to L leg with staples, d/i, no s/sx of infection. pt mobilizing in bed independently, nad, vss

## 2020-12-01 NOTE — PROGRESS NOTES
Patient is 4 days status post left above-knee amputation for nonreconstructable peripheral artery disease with severe ischemia.  Patient's pain appears to be under good control.  She is resting comfortably.    Hemoglobin 2 days ago 10.6 with WBC of 11.98.    Left above-knee stump incision healing satisfactorily without signs of infection.  No skin necrosis.    Okay for transfer to either acute or subacute rehab anytime from my standpoint.  Patient lives in [place] and may be the most appropriate location there.

## 2020-12-01 NOTE — DISCHARGE SUMMARY
Discharge Summary    Patient Name: Gwendolyn Mcwilliams  Age/Sex: 75 y.o. female  : 1945  MRN: [id number]  Patient Care Team:  Mckenna Irving APRN as PCP - General (Nurse Practitioner)    Hospital Course     Date of Admit: 2020  Date of Discharge:  20   Discharge Condition: Stable    Discharge Diagnoses:    PVD (peripheral vascular disease) with claudication (CMS/HCC)    Type 2 diabetes mellitus with hyperglycemia (CMS/HCC)    Essential hypertension    Hyperlipidemia    S/P BKA (below knee amputation), right (CMS/HCC)    Osteoporosis    Hypothyroidism    Underweight BMI 16.5    Rhinovirus infection    S/P AKA (above knee amputation), left (CMS/HCC)    Present on Admission:  • (Resolved) Cellulitis of left leg  • PVD (peripheral vascular disease) with claudication (CMS/HCC)  • Type 2 diabetes mellitus with hyperglycemia (CMS/HCC)  • Essential hypertension  • Hyperlipidemia  • Osteoporosis  • Hypothyroidism  • Underweight BMI 16.5  • Rhinovirus infection      Hospital Course:   Gwendolyn Mcwilliams presented to [facility] with complaints of left leg pain with foot swelling and redness. Please see the admitting history and physical for further details. She was found to have left leg cellulits and was admitted to the hospital for further evaluation and treatment. Vascular surgery and rehab medicine were consulted. Patient had a recent vein bypass in 2020 that was found to be occluded and had significant rest pain. She was initially treated with antibiotics and ended up underling a left above the knee amputation on 20 by Dr. Tanisha Romero.     Post operatively she was having some issues with hyperglycemia and insulin was adjusted. A1c was noted at 9.8 and at this point I would resume home oral regimen and increase long acting insulin to 25 units nightly. Will also continue humalog 6 units TID with meals plus sliding scale. She will need to follow up with PCP  as outpatient and likely needs further insulin adjustments while in rehab. TSH was noted to be low and levothyroxine was reduced to 37.5 mcg and will need repeat thyroid studies in 6 weeks with PCP.     Overall pain was well controlled on Cymbalta and Neurontin with break through oxycodone. KENISHA reviewed and script provided for 3 day supply for break through pain. She did test positive for the rhinovirus, but symptoms have been overall stable with no cough and is on room air at time of discharge. BP improved with added Norvasc that will be continued at discharge.    Continue daily betadine dressing changes to stump and follow up with vascular in 3 weeks with staple removal. No further antiplatelet regimen recommended per vascular. PT and OT evaluated and recommended further skilled therapies to help with adjustment especially since she was walking with a walker and R prosthetic prior to admission and already has a wheelchair at home. She continued to have urinary retention after clement removed and clement was replaced 12/2 and would continue for a week and do another voiding trial at rehab once more mobile.  Overall she is stable and has been accepted to rehab. Discharged was delayed due to transportation issues and has confirmed transportation this afternoon.     Consults:   IP CONSULT TO HOSPITALIST  IP CONSULT TO VASCULAR SURGERY  IP CONSULT TO PHYSICAL MEDICINE REHAB  IP CONSULT TO REHAB ADMISSION    Significant Discharge Diagnostics   Procedures Performed:  Procedure(s):  AMPUTATION ABOVE KNEE       Pertinent Lab Results:  Results from last 7 days   Lab Units 12/01/20  1210 11/28/20  0633 11/27/20  0508 11/26/20  0500   SODIUM mmol/L 139 138  --   --    POTASSIUM mmol/L 4.4 3.7  --   --    CHLORIDE mmol/L 101 101  --   --    CO2 mmol/L 27.8 26.9  --   --    BUN mg/dL 20 7*  --   --    CREATININE mg/dL 0.60 0.54* 0.66 0.57   GLUCOSE mg/dL 316* 236*  --   --    CALCIUM mg/dL 9.2 8.8  --   --    AST (SGOT) U/L  --   23  --   --    ALT (SGPT) U/L  --  13  --   --      Results from last 7 days   Lab Units 11/28/20  0633   TROPONIN T ng/mL <0.010     Results from last 7 days   Lab Units 12/01/20  1210 11/29/20  0556 11/28/20  0501   WBC 10*3/mm3 10.88* 11.98* 15.44*   HEMOGLOBIN g/dL 9.5* 10.6* 10.5*   HEMATOCRIT % 29.8* 32.9* 33.0*   PLATELETS 10*3/mm3 470* 396 419   MCV fL 89.2 90.1 89.2   MCH pg 28.4 29.0 28.4   MCHC g/dL 31.9 32.2 31.8   RDW % 13.2 13.1 13.8   RDW-SD fl 43.1 44.2 45.2   MPV fL 9.8 9.7 9.8   NEUTROPHIL % %  --  67.0 70.7   LYMPHOCYTE % %  --  18.7* 16.1*   MONOCYTES % %  --  12.4* 12.2*   EOSINOPHIL % %  --  1.1 0.1*   BASOPHIL % %  --  0.4 0.3   IMM GRAN % %  --  0.4 0.6*   NEUTROS ABS 10*3/mm3  --  8.03* 10.93*   LYMPHS ABS 10*3/mm3  --  2.24 2.48   MONOS ABS 10*3/mm3  --  1.48* 1.88*   EOS ABS 10*3/mm3  --  0.13 0.02   BASOS ABS 10*3/mm3  --  0.05 0.04   IMMATURE GRANS (ABS) 10*3/mm3  --  0.05 0.09*   NRBC /100 WBC  --  0.0 0.0                   Invalid input(s): LDLCALC      Results from last 7 days   Lab Units 11/28/20  0633   TSH uIU/mL 0.051*                             Results from last 7 days   Lab Units 11/26/20  1557   ADENOVIRUS DETECTION BY PCR  Not Detected   CORONAVIRUS 229E  Not Detected   CORONAVIRUS HKU1  Not Detected   CORONAVIRUS NL63  Not Detected   CORONAVIRUS OC43  Not Detected   HUMAN METAPNEUMOVIRUS  Not Detected   HUMAN RHINOVIRUS/ENTEROVIRUS  Detected*   INFLUENZA B PCR  Not Detected   PARAINFLUENZA 1  Not Detected   PARAINFLUENZA VIRUS 2  Not Detected   PARAINFLUENZA VIRUS 3  Not Detected   PARAINFLUENZA VIRUS 4  Not Detected   BORDETELLA PERTUSSIS PCR  Not Detected   BORDETELLA PARAPERTUSSIS PCR  Not Detected   CHLAMYDOPHILA PNEUMONIAE PCR  Not Detected   MYCOPLAMA PNEUMO PCR  Not Detected   RSV, PCR  Not Detected           Imaging Results:  Imaging Results (Most Recent)     Procedure Component Value Units Date/Time    XR Foot 3+ View Left [[id number]] Collected: 11/23/20 1945      Updated: 11/23/20 1953    Narrative:      Left foot radiograph     HISTORY:Swelling, erythema and pain; history of right below-the-knee  dictation     TECHNIQUE: AP, lateral and oblique radiographs of the left foot     COMPARISON:None       Impression:      FINDINGS AND IMPRESSION:  No fracture or dislocation is visualized. While no discrete area of  osteolysis is visualized, given patient history, please note that plain  film radiographs are insensitive for the detection of early  osteomyelitis and if there is continued clinical concern for osseous or  soft tissue infection, further evaluation with MRI left foot with and  without contrast is recommended.     This report was finalized on 11/23/2020 7:50 PM by Dr. Kiran Jeong M.D.               Objective:   Temp:  [97.3 °F (36.3 °C)-98.4 °F (36.9 °C)] 98.4 °F (36.9 °C)  Heart Rate:  [] 89  Resp:  [18] 18  BP: (105-127)/(54-76) 127/63   SpO2:  [98 %-100 %] 98 %  on    Device (Oxygen Therapy): room air    Intake/Output Summary (Last 24 hours) at 12/2/2020 1232  Last data filed at 12/2/2020 0935  Gross per 24 hour   Intake 720 ml   Output --   Net 720 ml     Body mass index is 15.73 kg/m².      11/23/20  1745 11/24/20  0122   Weight: 49 kg (108 lb) 41.6 kg (91 lb 11.2 oz)       Physical Exam  Vitals signs and nursing note reviewed.   Constitutional:       General: She is not in acute distress.     Appearance: Normal appearance.   Neck:      Vascular: No JVD.      Trachea: No tracheal deviation.   Cardiovascular:      Rate and Rhythm: Normal rate and regular rhythm.      Heart sounds: No murmur.   Pulmonary:      Effort: Pulmonary effort is normal. No respiratory distress.      Breath sounds: Normal breath sounds.   Abdominal:      General: Bowel sounds are normal.      Palpations: Abdomen is soft.      Tenderness: There is no abdominal tenderness.   Musculoskeletal:         General: Swelling (trace L stump) present.      Comments: Left AKA with edema and  tenderness  Right BKA   Skin:     General: Skin is warm and dry.   Neurological:      Mental Status: She is alert.   Psychiatric:         Behavior: Behavior is cooperative.         Discharge Medications and Instructions:     Discharge Medications     Discharge Medications      New Medications      Instructions Start Date   acetaminophen 500 MG tablet  Commonly known as: TYLENOL   1,000 mg, Oral, Every 6 Hours Scheduled      amLODIPine 2.5 MG tablet  Commonly known as: NORVASC   2.5 mg, Oral, Every 24 Hours Scheduled      bisacodyl 10 MG suppository  Commonly known as: DULCOLAX   10 mg, Rectal, Daily PRN      insulin lispro 100 UNIT/ML injection  Commonly known as: humaLOG   0-9 Units, Subcutaneous, 3 Times Daily Before Meals      insulin lispro 100 UNIT/ML injection  Commonly known as: humaLOG   6 Units, Subcutaneous, 3 Times Daily With Meals      oxyCODONE 5 MG immediate release tablet  Commonly known as: ROXICODONE   5 mg, Oral, Every 6 Hours PRN      polyethylene glycol 17 g packet  Commonly known as: MIRALAX   17 g, Oral, Daily         Changes to Medications      Instructions Start Date   famotidine 20 MG tablet  Commonly known as: PEPCID  What changed: when to take this   20 mg, Oral, 2 Times Daily      levothyroxine 75 MCG tablet  Commonly known as: SYNTHROID, LEVOTHROID  What changed:   · medication strength  · how much to take   37.5 mcg, Oral, Daily      metoclopramide 5 MG tablet  Commonly known as: REGLAN  What changed:   · when to take this  · reasons to take this   5 mg, Oral, 3 Times Daily PRN         Continue These Medications      Instructions Start Date   atorvastatin 20 MG tablet  Commonly known as: LIPITOR   20 mg, Oral, Daily      busPIRone 10 MG tablet  Commonly known as: BUSPAR   10 mg, Oral, 2 times daily      DULoxetine 60 MG capsule  Commonly known as: CYMBALTA   60 mg, Oral, Daily      gabapentin 400 MG capsule  Commonly known as: NEURONTIN   400 mg, Oral, 3 Times Daily      losartan 50 MG  tablet  Commonly known as: COZAAR   50 mg, Oral, Daily      metFORMIN 850 MG tablet  Commonly known as: GLUCOPHAGE   850 mg, Oral, 2 Times Daily With Meals      prenatal (CLASSIC) vitamin  tablet  Generic drug: prenatal vitamin   1 tablet, Oral, Daily      SITagliptin 100 MG tablet  Commonly known as: JANUVIA   100 mg, Oral, Daily      TRESIBA FLEXTOUCH SC   25 Units, Subcutaneous, Nightly      VITAMIN D2 PO   50,000 Units, Oral, Weekly, TUES         Stop These Medications    cilostazol 100 MG tablet  Commonly known as: PLETAL     clopidogrel 75 MG tablet  Commonly known as: PLAVIX             Medication Reconciliation: Please see electronically completed Med Rec.    Discharge Diet:    Dietary Orders (From admission, onward)     Start     Ordered    11/27/20 1243  Diet Regular; Cardiac, Consistent Carbohydrate  Diet Effective Now     Question Answer Comment   Diet Texture / Consistency Regular    Common Modifiers Cardiac    Common Modifiers Consistent Carbohydrate        11/27/20 1243    11/24/20 1800  Dietary Nutrition Supplements Boost Glucose Control (Glucerna Shake)  Daily With Breakfast, Lunch & Dinner     Question:  Select Supplement:  Answer:  Boost Glucose Control (Glucerna Shake)    11/24/20 1603                Activity at Discharge:    Activity Instructions     Activity as Tolerated             Discharge disposition: Rehab    Discharge Instructions and Follow ups:  Follow-up Information     Mckenna Irving APRN .    Specialty: Nurse Practitioner  Contact information:  [address] [facility] [address] [phone]             Tanisha Romero Jr., MD Follow up in 3 week(s).    Specialty: Vascular Surgery  Why: with staple removal  Contact information:  [address] [facility] [address]  [facility] [address] [phone]                 Future Appointments   Date Time Provider Department Center   12/2/2020  3:00 PM EMS MED 1  [facility]       Total time spent discharging patient including  evaluation, medication reconciliation, arranging follow up, and post hospitalization instructions and education total time exceeds 30 minutes.      Madhuri Denton, CESAR  12/02/20  12:32 EST

## 2020-12-01 NOTE — PLAN OF CARE
Goal Outcome Evaluation:  Plan of Care Reviewed With: patient  Progress: improving  Outcome Summary: Pt resting well. Worked with PT to the EOB. Plan is to go to acute rehab tomorrow if we can get ahold of the family. C/o pain--medicated per MAR. Dressing in place (change once a day and they chnged it on night). Up eating for meals. Insulin given per SSI. No acute distress.  used. Will continue to monitor.

## 2020-12-01 NOTE — PROGRESS NOTES
Progress Note    Name: Gwendolyn Mcwilliams ADMIT: 2020   : 1945  PCP: Mckenna Irving APRN    MRN: [id number] LOS: 8 days   AGE/SEX: 75 y.o. female  ROOM: E655/1   Date of Encounter Visit: 20    Subjective:     Interval History: interpretor services used during encounter. Awaiting placement.     REVIEW OF SYSTEMS:   no fever or chills.,   No chest pain, palpitations.  No shortness of breath, cough or sputum.   No nausea, vomiting or diarrhea. Good BM today.   Pain tolerable.  When asked why she did not take the long acting insulin last night she stated that she only takes the insulin from the girl and some elsa tried to give it to her and she thought it was a double dose and was confused as she thought she already had taken it.     Objective:   Temp:  [96 °F (35.6 °C)-98.5 °F (36.9 °C)] 96 °F (35.6 °C)  Heart Rate:  [] 97  Resp:  [16-18] 18  BP: (134-153)/(48-69) 139/69   SpO2:  [93 %-99 %] 99 %  on    Device (Oxygen Therapy): room air    Intake/Output Summary (Last 24 hours) at 2020 1219  Last data filed at 2020 0825  Gross per 24 hour   Intake 360 ml   Output 1550 ml   Net -1190 ml     Body mass index is 15.73 kg/m².      20  1745 20  0122   Weight: 49 kg (108 lb) 41.6 kg (91 lb 11.2 oz)     Weight change:     Physical Exam  Vitals signs and nursing note reviewed.   Constitutional:       General: She is not in acute distress.     Appearance: Normal appearance.   Neck:      Vascular: No JVD.      Trachea: No tracheal deviation.   Cardiovascular:      Rate and Rhythm: Normal rate and regular rhythm.      Heart sounds: No murmur.   Pulmonary:      Effort: Pulmonary effort is normal. No respiratory distress.      Breath sounds: Normal breath sounds.   Abdominal:      General: Bowel sounds are normal. There is no distension.      Palpations: Abdomen is soft.      Tenderness: There is no abdominal tenderness.   Musculoskeletal:      Comments: Left AKA with edema  and tenderness  Right BKA   Skin:     General: Skin is warm and dry.   Neurological:      Mental Status: She is alert.   Psychiatric:         Behavior: Behavior is cooperative.       Results Review:      Results from last 7 days   Lab Units 11/28/20  0633 11/27/20  0508 11/26/20  0500 11/25/20  0530   SODIUM mmol/L 138  --   --   --    POTASSIUM mmol/L 3.7  --   --   --    CHLORIDE mmol/L 101  --   --   --    CO2 mmol/L 26.9  --   --   --    BUN mg/dL 7*  --   --   --    CREATININE mg/dL 0.54* 0.66 0.57 0.75   GLUCOSE mg/dL 236*  --   --   --    CALCIUM mg/dL 8.8  --   --   --    AST (SGOT) U/L 23  --   --   --    ALT (SGPT) U/L 13  --   --   --      Estimated Creatinine Clearance: 41.1 mL/min (A) (by C-G formula based on SCr of 0.54 mg/dL (L)).      Results from last 7 days   Lab Units 12/01/20  1122 12/01/20  0611 11/30/20  2043 11/30/20  1629 11/30/20  1109 11/30/20  0604 11/29/20  2029 11/29/20  1611   GLUCOSE mg/dL 284* 185* 227* 395* 268* 260* 334* 245*     Results from last 7 days   Lab Units 11/28/20  0633   TROPONIN T ng/mL <0.010         Results from last 7 days   Lab Units 11/28/20  0633   TSH uIU/mL 0.051*           Invalid input(s):  PHOS        Invalid input(s): LDLCALC  Results from last 7 days   Lab Units 11/29/20  0556 11/28/20  0501   WBC 10*3/mm3 11.98* 15.44*   HEMOGLOBIN g/dL 10.6* 10.5*   HEMATOCRIT % 32.9* 33.0*   PLATELETS 10*3/mm3 396 419   MCV fL 90.1 89.2   MCH pg 29.0 28.4   MCHC g/dL 32.2 31.8   RDW % 13.1 13.8   RDW-SD fl 44.2 45.2   MPV fL 9.7 9.8   NEUTROPHIL % % 67.0 70.7   LYMPHOCYTE % % 18.7* 16.1*   MONOCYTES % % 12.4* 12.2*   EOSINOPHIL % % 1.1 0.1*   BASOPHIL % % 0.4 0.3   IMM GRAN % % 0.4 0.6*   NEUTROS ABS 10*3/mm3 8.03* 10.93*   LYMPHS ABS 10*3/mm3 2.24 2.48   MONOS ABS 10*3/mm3 1.48* 1.88*   EOS ABS 10*3/mm3 0.13 0.02   BASOS ABS 10*3/mm3 0.05 0.04   IMMATURE GRANS (ABS) 10*3/mm3 0.05 0.09*   NRBC /100 WBC 0.0 0.0                             Results from last 7 days   Lab  Units 11/26/20  1557   ADENOVIRUS DETECTION BY PCR  Not Detected   CORONAVIRUS 229E  Not Detected   CORONAVIRUS HKU1  Not Detected   CORONAVIRUS NL63  Not Detected   CORONAVIRUS OC43  Not Detected   HUMAN METAPNEUMOVIRUS  Not Detected   HUMAN RHINOVIRUS/ENTEROVIRUS  Detected*   INFLUENZA B PCR  Not Detected   PARAINFLUENZA 1  Not Detected   PARAINFLUENZA VIRUS 2  Not Detected   PARAINFLUENZA VIRUS 3  Not Detected   PARAINFLUENZA VIRUS 4  Not Detected   BORDETELLA PERTUSSIS PCR  Not Detected   CHLAMYDOPHILA PNEUMONIAE PCR  Not Detected   MYCOPLAMA PNEUMO PCR  Not Detected   INFLUENZA A PCR  Not Detected   RSV, PCR  Not Detected               Imaging:  Imaging Results (Last 24 Hours)     ** No results found for the last 24 hours. **          I reviewed the patient's new clinical results and medications.         Medication Review:   Medications reviewed. See electronic record    Problem List:     Active Hospital Problems    Diagnosis  POA   • **Cellulitis of left leg [L03.116]  Yes   • Underweight BMI 16.5 [R63.6]  Yes   • Rhinovirus infection [B34.8]  Yes   • Essential hypertension [I10]  Yes   • Hyperlipidemia [E78.5]  Yes   • S/P BKA (below knee amputation), right (CMS/HCC) [Z89.511]  Not Applicable   • Osteoporosis [M81.0]  Yes   • Hypothyroidism [E03.9]  Yes   • Type 2 diabetes mellitus with hyperglycemia (CMS/HCC) [E11.65]  Yes   • PVD (peripheral vascular disease) with claudication (CMS/HCC) [I73.9]  Yes      Resolved Hospital Problems   No resolved problems to display.       Assessment and Plan:     75 y.o. female admitted with Cellulitis of left leg after recent left leg bypass. She underwent Left AKA amputation on 11/27/20 by Dr. Tanisha Romero.      · Cellulitis/PVD.Continue postoperative care per vascular surgery. Pain controlled on Cymbalta, Neurontin and PRN oxycodone. No need for antiplatelet therapy per vascular. PT/OT following. Needs further skilled therapies and awaiting rehab placement.   · DM. A1c  9.8. Continues to have very poor glycemic control and refused lantus last night. continue Lantus 20 units nightly (was on 25 units tresciba at home according to med rec) and scheduled Humalog 6 units with meals plus correctional SS. Hold oral meds for now, but consider resuming at discharge.   · HLD. Continue lipitor.   · Hypothyroid. TSH 0.05 on 11/28 with normal free T4. reduce levothyroxine to 37.5 mcg and repeat thyroid studies in 6 weeks.   · Rhinovirus. Continue symptomatic treatment. On room air.   · HTN. BP stable on losartan and norvasc (added this admit)    VTE prophylaxis: Lovenox 30 mg SC daily  CODE status: full code  Disposition: TBD-awaiting placement. Acute rehab to consult in am per delay in schedule    I discussed the patients findings and my recommendations with patient.    I wore full protective equipment throughout this patient encounter including a face mask and eye shield. Hand hygiene was performed upon entering patients room and when leaving the room.    Greater than 35 minutes spent in review of records and >50% of time spent in education and counseling with patient.     Madhuri Denton, APRN  12/01/20

## 2020-12-01 NOTE — THERAPY TREATMENT NOTE
Patient Name: Gwendolyn Mcwilliams  : 1945    MRN: [id number]                              Today's Date: 2020       Admit Date: 2020    Visit Dx:     ICD-10-CM ICD-9-CM   1. Cellulitis of left leg  L03.116 682.6   2. Hyperglycemia  R73.9 790.29   3. Poorly controlled diabetes mellitus (CMS/[facility])  E11.65 250.00   4. Peripheral artery disease (CMS/[facility])  I73.9 443.9   5. Rhinovirus infection  B34.8 079.3   6. 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 (CMS/[facility])   • PVD (peripheral vascular disease) with claudication (CMS/[facility])   • Type 2 diabetes mellitus with hyperglycemia (CMS/[facility])   • Cellulitis of left leg   • Essential hypertension   • Hyperlipidemia   • GERD (gastroesophageal reflux disease)   • S/P BKA (below knee amputation), right (CMS/[facility])   • Osteoporosis   • Hypothyroidism   • Underweight BMI 16.5   • Rhinovirus infection     Past Medical History:   Diagnosis Date   • Anxiety and depression    • Diabetes mellitus (CMS/[facility])     TYPE 2   • GERD (gastroesophageal reflux disease)    • Hx of right BKA (CMS/[facility])     USES PROSTHESIS   • Hyperlipidemia     PREVENTATIVE   • Hypertension    • Hypothyroidism    • Insomnia    • Nausea     DOESNT DIGEST FOOD WELL   • Osteoporosis     BACK AND HIPS   • Peripheral vascular disease (CMS/HCC)    • Poor circulation of extremity    • Post-menopausal    • Swallowing difficulty      Past Surgical History:   Procedure Laterality Date   • ABOVE KNEE AMPUTATION Left 2020    Procedure: AMPUTATION ABOVE KNEE;  Surgeon: Tanisha Romero Jr., MD;  Location: [facility];  Service: Vascular;  Laterality: Left;   • AMPUTATION      RBKA   • ANGIOPLASTY FEMORAL ARTERY Left 2019    Procedure: AORTIC ILIAC FEMORAL LEFT LOWER EXTREMITY RUNOFF LEFT SUPERFICIAL FEMORAL ARTERY/POP PERCONTANEOUS TRANSLUMINAL ANGIOPLASTY;  Surgeon: Nick  Tanisha Palomino Jr., MD;  Location: [facility] OR 18/19;  Service: Vascular   • ANGIOPLASTY FEMORAL ARTERY Left 2/24/2020    Procedure: AORTO ILIAC FEMORAL WITH LEFT SUPERFICIAL FEMORAL ARTERY AND LEFT COMMON ILIAC PERCUTANEOUS TRANSLUMINAL ANGIOPLASTY STENT;  Surgeon: Tanisha Romero Jr., MD;  Location: [facility] OR 18/19;  Service: Vascular;  Laterality: Left;   • CATARACT EXTRACTION, BILATERAL     • EYE SURGERY     • FEMORAL TIBIAL BYPASS Left 4/13/2020    Procedure: LEFT FEMORAL TIBIAL BYPASS USING TRANSLOCATED GREATER SAPHENOUS VEIN;  Surgeon: Tanisha Romero Jr., MD;  Location: [facility];  Service: Vascular;  Laterality: Left;   • VASCULAR SURGERY      IN THE PAST JARROD RICO     General Information     Row Name 12/01/20 0941          Physical Therapy Time and Intention    Document Type  therapy note (daily note)  -PH     Mode of Treatment  physical therapy  -PH     Row Name 12/01/20 0941          Safety Issues, Functional Mobility    Impairments Affecting Function (Mobility)  balance;endurance/activity tolerance;strength;postural/trunk control  -PH     Comment, Safety Issues/Impairments (Mobility)  R BKA continues to be swollen / unable to don prosthetic  -PH       User Key  (r) = Recorded By, (t) = Taken By, (c) = Cosigned By    Initials Name Provider Type    PH Amy Guardado PTA Physical Therapy Assistant        Mobility     Row Name 12/01/20 0941          Bed Mobility    Rolling Left [place] (Bed Mobility)  moderate assist (50% patient effort);verbal cues;nonverbal cues (demo/gesture);1 person assist  -PH     Scooting/Bridging [place] (Bed Mobility)  minimum assist (75% patient effort);2 person assist;verbal cues;nonverbal cues (demo/gesture)  -PH     Comment (Bed Mobility)  Pt having difficulty maintaining balance at EOB req CGA for short periods w/ pt's b hands gripping bed / other times mod A;  -PH     Row Name 12/01/20 0941          Bed-Chair Transfer     Bed-Chair [place] (Transfers)  not tested;unable to assess  -PH     Row Name 12/01/20 0941          Sit-Stand Transfer    Sit-Stand [place] (Transfers)  unable to assess;not tested  -[facility] Name 12/01/20 0941          Gait/Stairs (Locomotion)    [place] Level (Stairs)  unable to assess;not tested  -       User Key  (r) = Recorded By, (t) = Taken By, (c) = Cosigned By    Initials Name Provider Type    Amy Montes PTA Physical Therapy Assistant        Obj/Interventions     Row Name 12/01/20 0943          Motor Skills    Therapeutic Exercise  other (see comments) LLE: hip flex/ext, hip abd/add - both x 10 reps  -[facility] Name 12/01/20 0943          Lower Extremity (Manual Muscle Testing)    Comment, MMT: Lower Extremity  Pt having difficulty holding herself upright at EOB today varying bet mod A and CGA w/ B hands holding bed - approx 3 min. Pt was placed in long sitting and was able to hold herself upright for 3 min w/ SV.  -PH       User Key  (r) = Recorded By, (t) = Taken By, (c) = Cosigned By    Initials Name Provider Type     Amy Guardado PTA Physical Therapy Assistant        Goals/Plan    No documentation.       Clinical Impression     [facility] Name 12/01/20 0945          Pain    Additional Documentation  Pain Scale: FACES Pre/Post-Treatment (Group)  -PH     Row Name 12/01/20 0945          Pain Scale: Numbers Pre/Post-Treatment    Pain Intervention(s)  Repositioned;Rest  -PH     Row Name 12/01/20 0945          Pain Scale: FACES Pre/Post-Treatment    Pain: FACES Scale, Pretreatment  0-->no hurt  -PH     Posttreatment Pain Rating  4-->hurts little more  -PH     Pain Location - Side  Left  -PH     Pain Location - Orientation  -- AKA  -PH     Pain Location  extremity  -PH     Row Name 12/01/20 0945          Plan of Care Review    Plan of Care Reviewed With  patient  -     Progress  no change  -PH     Outcome Summary  Pt still unable to graciela prosthetic 2/2 RLE swelling.  Pt had difficulty holding herself upright at EOB for 3 min varying bet mod a and CGA. Pt long sat in bed for approx 3 min w/ SV. PT will prog as pt billie.  -PH     Row Name 12/01/20 0945          Positioning and Restraints    Pre-Treatment Position  in bed  -PH     Post Treatment Position  bed  -PH     In Bed  fowlers;call light within reach;encouraged to call for assist;exit alarm on  -PH       User Key  (r) = Recorded By, (t) = Taken By, (c) = Cosigned By    Initials Name Provider Type     Amy Guardado PTA Physical Therapy Assistant        Outcome Measures     Row Name 12/01/20 0947          How much help from another person do you currently need...    Turning from your back to your side while in flat bed without using bedrails?  2  -PH     Moving from lying on back to sitting on the side of a flat bed without bedrails?  2  -PH     Moving to and from a bed to a chair (including a wheelchair)?  1  -PH     Standing up from a chair using your arms (e.g., wheelchair, bedside chair)?  2  -PH     Climbing 3-5 steps with a railing?  1  -PH     To walk in hospital room?  1  -PH     AM-PAC 6 Clicks Score (PT)  9  -PH     Row Name 12/01/20 0947          Functional Assessment    Outcome Measure Options  AM-PAC 6 Clicks Basic Mobility (PT)  -PH       User Key  (r) = Recorded By, (t) = Taken By, (c) = Cosigned By    Initials Name Provider Type     Amy Guardado PTA Physical Therapy Assistant        Physical Therapy Education                 Title: PT OT SLP Therapies (In Progress)     Topic: Physical Therapy (Done)     Point: Mobility training (Done)     Learning Progress Summary           Patient Acceptance, E,D, VU,NR,DU by  at 12/1/2020 0947    Acceptance, E,TB,D, VU,DU,NR by  at 11/30/2020 1523                   Point: Home exercise program (Done)     Learning Progress Summary           Patient Acceptance, E,D, VU,NR,DU by  at 12/1/2020 0947                   Point: Body mechanics (Done)      Learning Progress Summary           Patient Acceptance, E,D, VU,NR,DU by  at 12/1/2020 0947                   Point: Precautions (Done)     Learning Progress Summary           Patient Acceptance, E,D, VU,NR,DU by  at 12/1/2020 0947    Acceptance, E,TB,D, VU,DU,NR by  at 11/30/2020 1523                               User Key     Initials Effective Dates Name Provider Type Discipline     08/20/19 -  Amy Guardado PTA Physical Therapy Assistant PT     05/26/20 -  Sheila Arredondo PT Physical Therapist PT              PT Recommendation and Plan     Plan of Care Reviewed With: patient  Progress: no change  Outcome Summary: Pt still unable to graciela prosthetic 2/2 RLE swelling. Pt had difficulty holding herself upright at EOB for 3 min varying bet mod a and CGA. Pt long sat in bed for approx 3 min w/ SV. PT will prog as pt billie.     Time Calculation:   PT Charges     Row Name 12/01/20 0948             Time Calculation    Start Time  0740  -PH      Stop Time  0803  -      Time Calculation (min)  23 min  -      PT Received On  12/01/20  -      PT - Next Appointment  12/02/20  -        User Key  (r) = Recorded By, (t) = Taken By, (c) = Cosigned By    Initials Name Provider Type     mAy Guardado PTA Physical Therapy Assistant        Therapy Charges for Today     Code Description Service Date Service Provider Modifiers Qty    43602963845 HC PT THERAPEUTIC ACT EA 15 MIN 12/1/2020 Amy Guardado PTA GP 2    97674714298 HC PT THER SUPP EA 15 MIN 12/1/2020 Amy Guardado PTA GP 1          PT G-Codes  Outcome Measure Options: AM-PAC 6 Clicks Basic Mobility (PT)  AM-PAC 6 Clicks Score (PT): 9  AM-PAC 6 Clicks Score (OT): 14    Amy Guardado PTA  12/1/2020

## 2020-12-01 NOTE — DISCHARGE PLACEMENT REQUEST
"Stoney Simon (75 y.o. Female)     Date of Birth Social Security Number Address Home Phone MRN    1945  [address] [facility]  [place] [address] [phone] [id number]    Ad[employer] Marital Status          [employer]        Admission Date Admission Type Admitting Provider Attending Provider Department, Room/Bed    11/23/20 Emergency Rony Trimble MD Ray, Jonathan, MD [address], [address]    Discharge Date Discharge Disposition Discharge Destination                       Attending Provider: Abdias Betancourt MD    Allergies: Aspirin    Isolation: Droplet   Infection: Rhinovirus  (11/23/20)   Code Status: CPR    Ht: 162.6 cm (64.02\")   Wt: 41.6 kg (91 lb 11.2 oz)    Admission Cmt: None   Principal Problem: Cellulitis of left leg [L03.116]                 Active Insurance as of 11/23/2020     Primary Coverage     Payor Plan Insurance Group Employer/Plan Group    MEDICARE MEDICARE A & B      Payor Plan Address Payor Plan Phone Number Payor Plan Fax Number Effective Dates    [address] [phone]  3/1/2012 - None Entered    [facility] [id number]       Subscriber Name Subscriber Birth Date Member ID       STONEY SIMON 1945 [id number]           Secondary Coverage     Payor Plan Insurance Group Employer/Plan Group    [place]      Payor Plan Address Payor Plan Phone Number Payor Plan Fax Number Effective Dates    [address] [phone]  11/23/2020 - None Entered    [place] [address]       Subscriber Name Subscriber Birth Date Member ID       STONEY SIMON 1945 [id number]                 Emergency Contacts      (Rel.) Home Phone Work Phone Mobile Phone    STACEY WALTON (Relative) -- -- [phone]    REYNOLDSAGUSTO HUTSON (Son) [phone] -- [phone]    Jack Santiago (Relative) -- -- [phone]    Manny Owen (Relative) -- -- [phone]    Racquel jay (Friend) -- -- [phone] "    GRICELDA HYDE ([facility]) [phone] -- --

## 2020-12-02 VITALS
TEMPERATURE: 98.4 F | HEART RATE: 89 BPM | HEIGHT: 64 IN | DIASTOLIC BLOOD PRESSURE: 63 MMHG | SYSTOLIC BLOOD PRESSURE: 127 MMHG | WEIGHT: 91.7 LBS | RESPIRATION RATE: 18 BRPM | OXYGEN SATURATION: 98 % | BODY MASS INDEX: 15.65 KG/M2

## 2020-12-02 LAB
GLUCOSE BLDC GLUCOMTR-MCNC: 289 MG/DL (ref 70–130)
GLUCOSE BLDC GLUCOMTR-MCNC: 289 MG/DL (ref 70–130)

## 2020-12-02 PROCEDURE — 63710000001 INSULIN LISPRO (HUMAN) PER 5 UNITS: Performed by: HOSPITALIST

## 2020-12-02 PROCEDURE — 82962 GLUCOSE BLOOD TEST: CPT

## 2020-12-02 PROCEDURE — 63710000001 INSULIN LISPRO (HUMAN) PER 5 UNITS: Performed by: SURGERY

## 2020-12-02 RX ORDER — GABAPENTIN 400 MG/1
400 CAPSULE ORAL 3 TIMES DAILY
Qty: 9 CAPSULE | Refills: 0 | Status: SHIPPED | OUTPATIENT
Start: 2020-12-02 | End: 2020-12-05

## 2020-12-02 RX ADMIN — DULOXETINE HYDROCHLORIDE 60 MG: 60 CAPSULE, DELAYED RELEASE ORAL at 09:05

## 2020-12-02 RX ADMIN — INSULIN LISPRO 6 UNITS: 100 INJECTION, SOLUTION INTRAVENOUS; SUBCUTANEOUS at 09:06

## 2020-12-02 RX ADMIN — ACETAMINOPHEN 1000 MG: 500 TABLET, FILM COATED ORAL at 05:25

## 2020-12-02 RX ADMIN — ATORVASTATIN CALCIUM 20 MG: 20 TABLET, FILM COATED ORAL at 09:05

## 2020-12-02 RX ADMIN — FAMOTIDINE 20 MG: 20 TABLET, FILM COATED ORAL at 06:46

## 2020-12-02 RX ADMIN — POLYETHYLENE GLYCOL 3350 17 G: 17 POWDER, FOR SOLUTION ORAL at 09:05

## 2020-12-02 RX ADMIN — ACETAMINOPHEN 1000 MG: 500 TABLET, FILM COATED ORAL at 00:00

## 2020-12-02 RX ADMIN — GABAPENTIN 400 MG: 400 CAPSULE ORAL at 05:24

## 2020-12-02 RX ADMIN — BUSPIRONE HYDROCHLORIDE 10 MG: 10 TABLET ORAL at 09:05

## 2020-12-02 RX ADMIN — LOSARTAN POTASSIUM 50 MG: 50 TABLET, FILM COATED ORAL at 09:05

## 2020-12-02 RX ADMIN — AMLODIPINE BESYLATE 2.5 MG: 2.5 TABLET ORAL at 09:05

## 2020-12-02 RX ADMIN — INSULIN LISPRO 6 UNITS: 100 INJECTION, SOLUTION INTRAVENOUS; SUBCUTANEOUS at 09:05

## 2020-12-02 RX ADMIN — LEVOTHYROXINE SODIUM 37.5 MCG: 75 TABLET ORAL at 05:25

## 2020-12-02 NOTE — PROGRESS NOTES
Continued Stay Note  [facility]     Patient Name: Gwendolyn Mcwilliams  MRN: [id number]  Today's Date: 12/2/2020    Admit Date: 11/23/2020    Discharge Plan     Row Name 12/02/20 1056       Plan    Plan  Plan [facility] in [place] for Acute Rehab.   COLLIN Collado RN    Patient/Family in Agreement with Plan  yes    Plan Comments  Received voicemail this am from (Maggi Blanchard [phone]) stating she was calling for [facility].   She stated pt's son was agreeable to pt going to [facility] for Acute Rehab.    Shruthi  ( [phone]) called and she states [facility] can accept pt today for Acute Rehab.   Discharge Summary faxed to [facility].  Discharge Summary in packet.  Prescriptions in packet.  Packet to RN.  Located within [facility] arranged to transport pt to [facility] in [place].  Plan [facility] for Acute Rehab.   COLLIN Collado RN        Discharge Codes    No documentation.       Expected Discharge Date and Time     Expected Discharge Date Expected Discharge Time    Dec 2, 2020             Rosaura Collado, LASHAWN

## 2020-12-02 NOTE — PROGRESS NOTES
Noted plans for [facility] in [place] (where pt is from ). Will sign off    Charlene Chapa RN  Acute Rehab Admission Nurse

## 2020-12-02 NOTE — PROGRESS NOTES
"Physicians Statement of Medical Necessity for  Ambulance Transportation    GENERAL INFORMATION     Name: Gwendolyn Mcwilliams  YOB: 1945  Medicare #: [id number]  Transport Date: 12/2/2020(Valid for round trips this date, or for scheduled repetitive trips for 60 days from the date signed below.)  Origin: [facility]   Destination: [address] [place] [address]    Is the Patient's stay covered under Medicare Part A (PPS/DRG?)Yes   Closest appropriate facility? Yes  If this a hosp-hosp transfer? No  Is this a hospice patient? No    MEDICAL NECESSITY QUESTIONAIRE    Ambulance Transportation is medically necessary only if other means of transportation are contraindicated or would be potentially harmful to the patient.  To meet this requirement, the patient must be either \"bed confined\" or suffer from a condition such that transport by means other than an ambulance is contraindicated by the patient's condition.  The following questions must be answered by the healthcare professional signing below for this form to be valid:     1) Describe the MEDICAL CONDITION (physical and/or mental) of this patient AT THE TIME OF AMBULANCE TRANSPORT that requires the patient to be transported in an ambulance, and why transport by other means is contraindicated by the patient's condition: LEFT AKA WITH EDEMA AND TENDERNESS AND RIGHT BKA  Past Medical History:   Diagnosis Date   • Anxiety and depression    • Diabetes mellitus (CMS/HCC)     TYPE 2   • GERD (gastroesophageal reflux disease)    • Hx of right BKA (CMS/HCC)     USES PROSTHESIS   • Hyperlipidemia     PREVENTATIVE   • Hypertension    • Hypothyroidism    • Insomnia    • Nausea     DOESNT DIGEST FOOD WELL   • Osteoporosis     BACK AND HIPS   • Peripheral vascular disease (CMS/HCC)    • Poor circulation of extremity    • Post-menopausal    • Swallowing difficulty       Past Surgical History:   Procedure Laterality Date   • " "ABOVE KNEE AMPUTATION Left 11/27/2020    Procedure: AMPUTATION ABOVE KNEE;  Surgeon: Tanisha Romero Jr., MD;  Location: [facility] MAIN OR;  Service: Vascular;  Laterality: Left;   • AMPUTATION      RBKA   • ANGIOPLASTY FEMORAL ARTERY Left 11/4/2019    Procedure: AORTIC ILIAC FEMORAL LEFT LOWER EXTREMITY RUNOFF LEFT SUPERFICIAL FEMORAL ARTERY/POP PERCONTANEOUS TRANSLUMINAL ANGIOPLASTY;  Surgeon: Tanisha Romero Jr., MD;  Location: [facility] OR 18/19;  Service: Vascular   • ANGIOPLASTY FEMORAL ARTERY Left 2/24/2020    Procedure: AORTO ILIAC FEMORAL WITH LEFT SUPERFICIAL FEMORAL ARTERY AND LEFT COMMON ILIAC PERCUTANEOUS TRANSLUMINAL ANGIOPLASTY STENT;  Surgeon: Tanisha Romero Jr., MD;  Location: [facility] OR 18/19;  Service: Vascular;  Laterality: Left;   • CATARACT EXTRACTION, BILATERAL     • EYE SURGERY     • FEMORAL TIBIAL BYPASS Left 4/13/2020    Procedure: LEFT FEMORAL TIBIAL BYPASS USING TRANSLOCATED GREATER SAPHENOUS VEIN;  Surgeon: Tanisha Romero Jr., MD;  Location: [facility] OR;  Service: Vascular;  Laterality: Left;   • VASCULAR SURGERY      IN THE PAST [place]      2) Is this patient \"bed confined\" as defined below?No    To be \"bed confined\" the patient must satisfy all three of the following criteria:  (1) unable to get up from bed without assistance; AND (2) unable to ambulate;  AND (3) unable to sit in a chair or wheelchair.  3) Can this patient safely be transported by car or wheelchair van (I.e., may safely sit during transport, without an attendant or monitoring?)No   4. In addition to completing questions 1-3 above, please check any of the following conditions that apply*:          *Note: supporting documentation for any boxes checked must be maintained in the patient's medical records Unable to tolerate seated position for time needed to transport      SIGNATURE OF PHYSICIAN OR OTHER AUTHORIZED HEALTHCARE PROFESSIONAL    I certify that the above information is " true and correct based on my evaluation of this patient, and represent that the patient requires transport by ambulance and that other forms of transport are contraindicated.  I understand that this information will be used by the Centers for Medicare and Medicaid Services (CMS) to support the determiniation of medical necessity for ambulance services, and I represent that I have personal knowledge of the patient's condition at the time of transport.       If this box is checked, I also certify that the patient is physically or mentally incapable of signing the ambulance service's claim form and that the institution with which I am affiliated has furnished care, services or assistance to the patient.  My signature below is made on behalf of the patient pursuant to 42 .36(b)(4). In accordance with 42 .37, the specific reason(s) that the patient is physically or mentally incapable of signing the claim for is as follows:   Signature of Physician or Healthcare Professional  Date/Time:   12/2/2020 @ 1100     (For Scheduled repetitive transport, this form is not valid for transports performed more than 60 days after this date).               KELLIE FLORES RN                                                                                                                            --------------------------------------------------------------------------------------------  Printed Name and Credentials of Physician or Authorized Healthcare Professional     *Form must be signed by patient's attending physician for scheduled, repetitive transports,.  For non-repetitive ambulance transports, if unable to obtain the signature of the attending physician, any of the following may sign (please select below):     Physician  Clinical Nurse Specialist X Registered Nurse     Physician Assistant XX Discharge Planner  Licensed Practical Nurse     Nurse Practitioner XX

## 2020-12-02 NOTE — PROGRESS NOTES
Case Management Discharge Note      Final Note: Pt discharged to [facility].   COLLIN Collado RN    Provided Post Acute Provider List?: Yes  Post Acute Provider List: Home Health  Delivered To: Support Person  Method of Delivery: Telephone    Selected Continued Care - Admitted Since 11/23/2020     Destination Coordination complete    Service Provider Selected Services Address Phone Fax Patient Preferred    [facility]  Inpatient Rehabilitation [address] [place] [address] [phone] --          Durable Medical Equipment    No services have been selected for the patient.              Dialysis/Infusion    No services have been selected for the patient.              Home Medical Care    No services have been selected for the patient.              Therapy    No services have been selected for the patient.              Community Resources    No services have been selected for the patient.                  Transportation Services  Ambulance: [facility]    Final Discharge Disposition Code: 62 - inpatient rehab facility

## 2020-12-03 ENCOUNTER — HOSPITAL ENCOUNTER (OUTPATIENT)
Dept: OTHER | Facility: HOSPITAL | Age: 75
Discharge: HOME | End: 2020-12-03

## 2020-12-03 LAB
25(OH)D3 SERPL-MCNC: 49.7 NG/ML (ref 30–100)
ALBUMIN SERPL-MCNC: 3.5 G/DL (ref 3.5–5)
ALBUMIN/GLOB SERPL: 0.9 {RATIO} (ref 1.4–2.6)
ALP SERPL-CCNC: 69 U/L (ref 43–160)
ALT SERPL-CCNC: 29 U/L (ref 10–40)
ANION GAP SERPL CALC-SCNC: 17 MMOL/L (ref 8–19)
AST SERPL-CCNC: 34 U/L (ref 15–50)
BASOPHILS # BLD AUTO: 0.07 10*3/UL (ref 0–0.2)
BASOPHILS NFR BLD AUTO: 0.5 % (ref 0–3)
BILIRUB SERPL-MCNC: 0.28 MG/DL (ref 0.2–1.3)
BUN SERPL-MCNC: 17 MG/DL (ref 5–25)
BUN/CREAT SERPL: 28 {RATIO} (ref 6–20)
CALCIUM SERPL-MCNC: 10.1 MG/DL (ref 8.7–10.4)
CHLORIDE SERPL-SCNC: 102 MMOL/L (ref 99–111)
CONV ABS IMM GRAN: 0.08 10*3/UL (ref 0–0.2)
CONV CO2: 28 MMOL/L (ref 22–32)
CONV IMMATURE GRAN: 0.5 % (ref 0–1.8)
CONV TOTAL PROTEIN: 7.3 G/DL (ref 6.3–8.2)
CREAT UR-MCNC: 0.61 MG/DL (ref 0.5–0.9)
DEPRECATED RDW RBC AUTO: 46.7 FL (ref 36.4–46.3)
EOSINOPHIL # BLD AUTO: 0.2 10*3/UL (ref 0–0.7)
EOSINOPHIL # BLD AUTO: 1.3 % (ref 0–7)
ERYTHROCYTE [DISTWIDTH] IN BLOOD BY AUTOMATED COUNT: 14.5 % (ref 11.7–14.4)
GFR SERPLBLD BASED ON 1.73 SQ M-ARVRAT: >60 ML/MIN/{1.73_M2}
GLOBULIN UR ELPH-MCNC: 3.8 G/DL (ref 2–3.5)
GLUCOSE SERPL-MCNC: 81 MG/DL (ref 65–99)
HCT VFR BLD AUTO: 30.4 % (ref 37–47)
HGB BLD-MCNC: 9.8 G/DL (ref 12–16)
LYMPHOCYTES # BLD AUTO: 3.05 10*3/UL (ref 1–5)
LYMPHOCYTES NFR BLD AUTO: 20.5 % (ref 20–45)
MCH RBC QN AUTO: 28.5 PG (ref 27–31)
MCHC RBC AUTO-ENTMCNC: 32.2 G/DL (ref 33–37)
MCV RBC AUTO: 88.4 FL (ref 81–99)
MONOCYTES # BLD AUTO: 1.48 10*3/UL (ref 0.2–1.2)
MONOCYTES NFR BLD AUTO: 9.9 % (ref 3–10)
NEUTROPHILS # BLD AUTO: 10.03 10*3/UL (ref 2–8)
NEUTROPHILS NFR BLD AUTO: 67.3 % (ref 30–85)
NRBC CBCN: 0 % (ref 0–0.7)
OSMOLALITY SERPL CALC.SUM OF ELEC: 297 MOSM/KG (ref 273–304)
PLATELET # BLD AUTO: 539 10*3/UL (ref 130–400)
PMV BLD AUTO: 10.4 FL (ref 9.4–12.3)
POTASSIUM SERPL-SCNC: 4.1 MMOL/L (ref 3.5–5.3)
RBC # BLD AUTO: 3.44 10*6/UL (ref 4.2–5.4)
SODIUM SERPL-SCNC: 143 MMOL/L (ref 135–147)
WBC # BLD AUTO: 14.91 10*3/UL (ref 4.8–10.8)

## 2020-12-04 ENCOUNTER — HOSPITAL ENCOUNTER (OUTPATIENT)
Dept: OTHER | Facility: HOSPITAL | Age: 75
Discharge: HOME | End: 2020-12-04

## 2020-12-04 LAB
BASOPHILS # BLD AUTO: 0.08 10*3/UL (ref 0–0.2)
BASOPHILS NFR BLD AUTO: 0.6 % (ref 0–3)
CONV ABS IMM GRAN: 0.09 10*3/UL (ref 0–0.2)
CONV IMMATURE GRAN: 0.7 % (ref 0–1.8)
DEPRECATED RDW RBC AUTO: 47.9 FL (ref 36.4–46.3)
EOSINOPHIL # BLD AUTO: 0.26 10*3/UL (ref 0–0.7)
EOSINOPHIL # BLD AUTO: 2 % (ref 0–7)
ERYTHROCYTE [DISTWIDTH] IN BLOOD BY AUTOMATED COUNT: 14.7 % (ref 11.7–14.4)
HCT VFR BLD AUTO: 32.3 % (ref 37–47)
HGB BLD-MCNC: 10.2 G/DL (ref 12–16)
LYMPHOCYTES # BLD AUTO: 3.57 10*3/UL (ref 1–5)
LYMPHOCYTES NFR BLD AUTO: 27.1 % (ref 20–45)
MCH RBC QN AUTO: 27.9 PG (ref 27–31)
MCHC RBC AUTO-ENTMCNC: 31.6 G/DL (ref 33–37)
MCV RBC AUTO: 88.5 FL (ref 81–99)
MONOCYTES # BLD AUTO: 1.22 10*3/UL (ref 0.2–1.2)
MONOCYTES NFR BLD AUTO: 9.3 % (ref 3–10)
NEUTROPHILS # BLD AUTO: 7.94 10*3/UL (ref 2–8)
NEUTROPHILS NFR BLD AUTO: 60.3 % (ref 30–85)
NRBC CBCN: 0 % (ref 0–0.7)
PLATELET # BLD AUTO: 532 10*3/UL (ref 130–400)
PMV BLD AUTO: 10.2 FL (ref 9.4–12.3)
RBC # BLD AUTO: 3.65 10*6/UL (ref 4.2–5.4)
WBC # BLD AUTO: 13.16 10*3/UL (ref 4.8–10.8)

## 2020-12-07 ENCOUNTER — HOSPITAL ENCOUNTER (OUTPATIENT)
Dept: OTHER | Facility: HOSPITAL | Age: 75
Discharge: HOME | End: 2020-12-07

## 2020-12-07 LAB
BASOPHILS # BLD AUTO: 0.07 10*3/UL (ref 0–0.2)
BASOPHILS NFR BLD AUTO: 0.6 % (ref 0–3)
CONV ABS IMM GRAN: 0.07 10*3/UL (ref 0–0.2)
CONV IMMATURE GRAN: 0.6 % (ref 0–1.8)
DEPRECATED RDW RBC AUTO: 48.1 FL (ref 36.4–46.3)
EOSINOPHIL # BLD AUTO: 0.23 10*3/UL (ref 0–0.7)
EOSINOPHIL # BLD AUTO: 2.1 % (ref 0–7)
ERYTHROCYTE [DISTWIDTH] IN BLOOD BY AUTOMATED COUNT: 14.6 % (ref 11.7–14.4)
HCT VFR BLD AUTO: 32.3 % (ref 37–47)
HGB BLD-MCNC: 10.1 G/DL (ref 12–16)
LYMPHOCYTES # BLD AUTO: 4.11 10*3/UL (ref 1–5)
LYMPHOCYTES NFR BLD AUTO: 38.1 % (ref 20–45)
MCH RBC QN AUTO: 28.6 PG (ref 27–31)
MCHC RBC AUTO-ENTMCNC: 31.3 G/DL (ref 33–37)
MCV RBC AUTO: 91.5 FL (ref 81–99)
MONOCYTES # BLD AUTO: 1.26 10*3/UL (ref 0.2–1.2)
MONOCYTES NFR BLD AUTO: 11.7 % (ref 3–10)
NEUTROPHILS # BLD AUTO: 5.06 10*3/UL (ref 2–8)
NEUTROPHILS NFR BLD AUTO: 46.9 % (ref 30–85)
NRBC CBCN: 0 % (ref 0–0.7)
PLATELET # BLD AUTO: 541 10*3/UL (ref 130–400)
PMV BLD AUTO: 10.6 FL (ref 9.4–12.3)
RBC # BLD AUTO: 3.53 10*6/UL (ref 4.2–5.4)
WBC # BLD AUTO: 10.8 10*3/UL (ref 4.8–10.8)

## 2020-12-14 ENCOUNTER — HOSPITAL ENCOUNTER (OUTPATIENT)
Dept: OTHER | Facility: HOSPITAL | Age: 75
Discharge: HOME | End: 2020-12-14

## 2020-12-14 LAB
BASOPHILS # BLD AUTO: 0.08 10*3/UL (ref 0–0.2)
BASOPHILS NFR BLD AUTO: 0.7 % (ref 0–3)
CONV ABS IMM GRAN: 0.06 10*3/UL (ref 0–0.2)
CONV IMMATURE GRAN: 0.5 % (ref 0–1.8)
DEPRECATED RDW RBC AUTO: 49.7 FL (ref 36.4–46.3)
EOSINOPHIL # BLD AUTO: 0.36 10*3/UL (ref 0–0.7)
EOSINOPHIL # BLD AUTO: 3.3 % (ref 0–7)
ERYTHROCYTE [DISTWIDTH] IN BLOOD BY AUTOMATED COUNT: 15.2 % (ref 11.7–14.4)
HCT VFR BLD AUTO: 32.5 % (ref 37–47)
HGB BLD-MCNC: 10.4 G/DL (ref 12–16)
LYMPHOCYTES # BLD AUTO: 4.51 10*3/UL (ref 1–5)
LYMPHOCYTES NFR BLD AUTO: 41.3 % (ref 20–45)
MCH RBC QN AUTO: 28.9 PG (ref 27–31)
MCHC RBC AUTO-ENTMCNC: 32 G/DL (ref 33–37)
MCV RBC AUTO: 90.3 FL (ref 81–99)
MONOCYTES # BLD AUTO: 1.29 10*3/UL (ref 0.2–1.2)
MONOCYTES NFR BLD AUTO: 11.8 % (ref 3–10)
NEUTROPHILS # BLD AUTO: 4.61 10*3/UL (ref 2–8)
NEUTROPHILS NFR BLD AUTO: 42.4 % (ref 30–85)
NRBC CBCN: 0 % (ref 0–0.7)
PLATELET # BLD AUTO: 431 10*3/UL (ref 130–400)
PMV BLD AUTO: 11 FL (ref 9.4–12.3)
RBC # BLD AUTO: 3.6 10*6/UL (ref 4.2–5.4)
WBC # BLD AUTO: 10.91 10*3/UL (ref 4.8–10.8)

## 2021-01-18 ENCOUNTER — OFFICE VISIT CONVERTED (OUTPATIENT)
Dept: GASTROENTEROLOGY | Facility: CLINIC | Age: 76
End: 2021-01-18
Attending: INTERNAL MEDICINE

## 2021-03-30 ENCOUNTER — HOSPITAL ENCOUNTER (OUTPATIENT)
Dept: OTHER | Facility: HOSPITAL | Age: 76
Discharge: HOME | End: 2021-03-30
Attending: NURSE PRACTITIONER

## 2021-03-30 LAB
ALBUMIN SERPL-MCNC: 3.9 G/DL (ref 3.5–5)
ALBUMIN/GLOB SERPL: 1.1 {RATIO} (ref 1.4–2.6)
ALP SERPL-CCNC: 64 U/L (ref 43–160)
ALT SERPL-CCNC: 30 U/L (ref 10–40)
ANION GAP SERPL CALC-SCNC: 19 MMOL/L (ref 8–19)
AST SERPL-CCNC: 24 U/L (ref 15–50)
BASOPHILS # BLD AUTO: 0.06 10*3/UL (ref 0–0.2)
BASOPHILS NFR BLD AUTO: 0.7 % (ref 0–3)
BILIRUB SERPL-MCNC: 0.47 MG/DL (ref 0.2–1.3)
BUN SERPL-MCNC: 20 MG/DL (ref 5–25)
BUN/CREAT SERPL: 26 {RATIO} (ref 6–20)
CALCIUM SERPL-MCNC: 10 MG/DL (ref 8.7–10.4)
CHLORIDE SERPL-SCNC: 100 MMOL/L (ref 99–111)
CHOLEST SERPL-MCNC: 84 MG/DL (ref 107–200)
CHOLEST/HDLC SERPL: 2.2 {RATIO} (ref 3–6)
CONV ABS IMM GRAN: 0.03 10*3/UL (ref 0–0.2)
CONV CO2: 23 MMOL/L (ref 22–32)
CONV IMMATURE GRAN: 0.3 % (ref 0–1.8)
CONV TOTAL PROTEIN: 7.5 G/DL (ref 6.3–8.2)
CREAT UR-MCNC: 0.76 MG/DL (ref 0.5–0.9)
DEPRECATED RDW RBC AUTO: 43.7 FL (ref 36.4–46.3)
EOSINOPHIL # BLD AUTO: 0.09 10*3/UL (ref 0–0.7)
EOSINOPHIL # BLD AUTO: 1 % (ref 0–7)
ERYTHROCYTE [DISTWIDTH] IN BLOOD BY AUTOMATED COUNT: 13.5 % (ref 11.7–14.4)
EST. AVERAGE GLUCOSE BLD GHB EST-MCNC: 214 MG/DL
GFR SERPLBLD BASED ON 1.73 SQ M-ARVRAT: >60 ML/MIN/{1.73_M2}
GLOBULIN UR ELPH-MCNC: 3.6 G/DL (ref 2–3.5)
GLUCOSE SERPL-MCNC: 273 MG/DL (ref 65–99)
HBA1C MFR BLD: 9.1 % (ref 3.5–5.7)
HCT VFR BLD AUTO: 36.2 % (ref 37–47)
HDLC SERPL-MCNC: 38 MG/DL (ref 40–60)
HGB BLD-MCNC: 11.6 G/DL (ref 12–16)
LDLC SERPL CALC-MCNC: 32 MG/DL (ref 70–100)
LYMPHOCYTES # BLD AUTO: 2.82 10*3/UL (ref 1–5)
LYMPHOCYTES NFR BLD AUTO: 31 % (ref 20–45)
MCH RBC QN AUTO: 28 PG (ref 27–31)
MCHC RBC AUTO-ENTMCNC: 32 G/DL (ref 33–37)
MCV RBC AUTO: 87.4 FL (ref 81–99)
MONOCYTES # BLD AUTO: 0.62 10*3/UL (ref 0.2–1.2)
MONOCYTES NFR BLD AUTO: 6.8 % (ref 3–10)
NEUTROPHILS # BLD AUTO: 5.47 10*3/UL (ref 2–8)
NEUTROPHILS NFR BLD AUTO: 60.2 % (ref 30–85)
NRBC CBCN: 0 % (ref 0–0.7)
OSMOLALITY SERPL CALC.SUM OF ELEC: 298 MOSM/KG (ref 273–304)
PLATELET # BLD AUTO: 303 10*3/UL (ref 130–400)
PMV BLD AUTO: 11.2 FL (ref 9.4–12.3)
POTASSIUM SERPL-SCNC: 4.2 MMOL/L (ref 3.5–5.3)
RBC # BLD AUTO: 4.14 10*6/UL (ref 4.2–5.4)
SODIUM SERPL-SCNC: 138 MMOL/L (ref 135–147)
T4 FREE SERPL-MCNC: 1.4 NG/DL (ref 0.9–1.8)
TRIGL SERPL-MCNC: 72 MG/DL (ref 40–150)
TSH SERPL-ACNC: 1.02 M[IU]/L (ref 0.27–4.2)
VLDLC SERPL-MCNC: 14 MG/DL (ref 5–37)
WBC # BLD AUTO: 9.09 10*3/UL (ref 4.8–10.8)

## 2021-03-31 LAB
25(OH)D3 SERPL-MCNC: 52.1 NG/ML (ref 30–100)
FOLATE SERPL-MCNC: 17.8 NG/ML (ref 4.8–20)
VIT B12 SERPL-MCNC: 576 PG/ML (ref 211–911)

## 2021-04-20 ENCOUNTER — HOSPITAL ENCOUNTER (OUTPATIENT)
Dept: GASTROENTEROLOGY | Facility: HOSPITAL | Age: 76
Setting detail: HOSPITAL OUTPATIENT SURGERY
Discharge: HOME | End: 2021-04-20
Attending: INTERNAL MEDICINE

## 2021-04-20 LAB — GLUCOSE BLD-MCNC: 102 MG/DL (ref 65–99)

## 2021-05-14 VITALS
SYSTOLIC BLOOD PRESSURE: 174 MMHG | DIASTOLIC BLOOD PRESSURE: 68 MMHG | HEIGHT: 62 IN | BODY MASS INDEX: 19.32 KG/M2 | WEIGHT: 105 LBS

## 2021-05-14 NOTE — PROGRESS NOTES
Progress Note      Patient Name: Gwendolyn Patel   Patient ID: [id number]   Sex: Female   YOB: 1945    Primary Care Provider: Mckenna GUERRERO   Referring Provider: Mckenna GUERRERO    Visit Date: January 18, 2021    Provider: Kathleen Gómez MD   Location: [facility]   Location Address: [address] [place] [address], [address]  [id number]   Location Phone: [phone]          Chief Complaint  · Follow up Abdominal pain       History Of Present Illness     Ms. Patel is a 74 year old female who presents for f/u of dysphagia, constipation.  She is Belgian speaking.  Her daughter assisted with translating.  Last visit was advised to have EGD/colonoscopy.  Plavix clearance to hold was denied by cardiology until had f/u appt.  Pt reports she has not been following with cardiology.  She reports dysphagia resolved.  She c/o nausea.  GES with delayed gastric emptying. Occ. epigastric pain, worse with eating, no radiation, no alleviating factors.  Occ. constipation.  Takes something for constipation that helps but does not know what.  Says it is a powder (miralax?).  No melena, hematochezia. No reflux.    GES with delayed gastric emptying.     FH: sister-throat cancer    No previous endoscopy.         Past Medical History  Diabetes; Eating disorder; GERD (gastroesophageal reflux disease)         Past Surgical History  EYE SURGERY; Leg amputation below knee         Medication List  atorvastatin 10 mg oral tablet; buspirone 10 mg oral tablet; famotidine 20 mg oral tablet; Januvia 100 mg oral tablet; losartan-hydrochlorothiazide 50-12.5 mg oral tablet; metformin 500 mg oral tablet; pentoxifylline 400 mg oral tablet extended release; Plavix 75 mg oral tablet; Suprep Bowel Prep Kit 17.5-3.13-1.6 gram oral recon soln; Tresiba U-100 Insulin 100 unit/mL subcutaneous solution; Trulicity 0.75 mg/0.5 mL subcutaneous pen injector; Vitamin D2 50,000 unit  "oral capsule; Zofran 4 mg oral tablet         Allergy List  aspirin       Allergies Reconciled  Family Medical History  Family history of throat cancer         Social History  Alcohol use (Never); ; Retired; Tobacco (Never)         Review of Systems  · Constitutional  o Denies  o : chills, fever  · Cardiovascular  o Denies  o : chest pain  · Respiratory  o Denies  o : shortness of breath  · Gastrointestinal  o Admits  o : see HPI   · Endocrine  o Denies  o : weight gain, weight loss      Vitals  Date Time BP Position Site L\R Cuff Size HR RR TEMP (F) WT  HT  BMI kg/m2 BSA m2 O2 Sat FR L/min FiO2 HC       01/18/2021 11:31 /68 Sitting       105lbs 0oz 5'  2\" 19.2 1.44             Physical Examination  · Constitutional  o Appearance  o : Healthy-appearing, awake and alert in no acute distress  · Head and Face  o Head  o : Normocephalic with no worriesome skin lesions  · Eyes  o Vision  o :   § Visual Fields  § : eyes move symmetrical in all directions  o Sclerae  o : sclerae anicteric  · Neck  o Inspection/Palpation  o : Trachea is midline, no adenopathy  · Respiratory  o Respiratory Effort  o : Breathing is unlabored.  o Inspection of Chest  o : normal appearance  o Auscultation of Lungs  o : Chest is clear to auscultation bilaterally.  · Cardiovascular  o Heart  o :   § Auscultation of Heart  § : no murmurs, rubs, or gallops  o Peripheral Vascular System  o :   § Extremities  § : no cyanosis, clubbing or edema;   · Gastrointestinal  o Abdominal Examination  o : Abdomen is soft, epigastric/lower abd tender to palpation, with normal active bowel sounds, no appreciable hepatosplenomegaly.          Assessment  · Abdominal Pain, Epigastric     789.06/R10.13  · Constipation     564.00/K59.00  · Gastroparesis     536.3/K31.84    Problems Reconciled  Plan  · Orders  o Consent for Esophagogastrodudodenoscopy (EGD) - Possible risk/complications, benefits, and alternatives to surgical or invasive procedure have " been explained to patient and/or legal guardian. -Patient has been evaluated and can tolerate anesthesia and/or sedation. Risk, benefits, and alternatives to anesthesia and sedation have been explained to patient and/or legal guardian. (45604) - - 01/18/2021  o Consent for Colonoscopy with Possible Biopsy - Possible risks/complications, benefits, and alternatives to surgical or invasive procedure have been explained to patient and/or legal guardian. -Patient has been evaluated and can tolerate anesthesia and/or sedation. Risks, benefits, and alternatives to anesthesia and sedation have been explained to patient and/or legal guardian. ([id number]) - - 01/18/2021  · Medications  o Medications have been Reconciled  o Transition of Care or Provider Policy  · Instructions  o Handouts provided: Pre-procedure instructions including date and time and location of procedure.  o PLAN: Proceed with procedure. Patient understands risks and benefits and is willing to proceed. Understands the risks include, but are not limited to, bleeding and/or perforation.  o Advised again to have EGD/colonoscopy to further workup symptoms. Office will contact cardiologist for clearance. Advised patient that it may be necessary to f/u with cardiology to obtain clearance pending their response. She expressed understanding.  o Electronically Identified Patient Education Materials Provided Electronically  · Disposition  o Follow up after procedure.            Electronically Signed by: Kathleen Gómez MD -Author on January 18, 2021 01:25:34 PM

## 2021-05-15 VITALS
DIASTOLIC BLOOD PRESSURE: 61 MMHG | SYSTOLIC BLOOD PRESSURE: 160 MMHG | HEIGHT: 62 IN | BODY MASS INDEX: 18.4 KG/M2 | WEIGHT: 100 LBS

## 2021-05-23 ENCOUNTER — TRANSCRIBE ORDERS (OUTPATIENT)
Dept: ADMINISTRATIVE | Facility: HOSPITAL | Age: 76
End: 2021-05-23

## 2021-05-23 DIAGNOSIS — Z12.39 SCREENING BREAST EXAMINATION: Primary | ICD-10-CM

## 2021-05-23 DIAGNOSIS — Z78.0 POST-MENOPAUSAL: Primary | ICD-10-CM

## 2021-05-23 DIAGNOSIS — Z12.31 SCREENING MAMMOGRAM, ENCOUNTER FOR: ICD-10-CM

## 2021-05-25 ENCOUNTER — HOSPITAL ENCOUNTER (OUTPATIENT)
Dept: CARDIOLOGY | Facility: HOSPITAL | Age: 76
Discharge: HOME | End: 2021-05-25
Attending: NURSE PRACTITIONER

## 2021-06-22 ENCOUNTER — LAB REQUISITION (OUTPATIENT)
Dept: LAB | Facility: HOSPITAL | Age: 76
End: 2021-06-22

## 2021-06-22 DIAGNOSIS — E53.8 DEFICIENCY OF OTHER SPECIFIED B GROUP VITAMINS: ICD-10-CM

## 2021-06-22 DIAGNOSIS — E55.9 VITAMIN D DEFICIENCY, UNSPECIFIED: ICD-10-CM

## 2021-06-22 DIAGNOSIS — K31.84 GASTROPARESIS: ICD-10-CM

## 2021-06-22 DIAGNOSIS — E03.9 HYPOTHYROIDISM, UNSPECIFIED: ICD-10-CM

## 2021-06-22 DIAGNOSIS — E11.59 TYPE 2 DIABETES MELLITUS WITH OTHER CIRCULATORY COMPLICATIONS (HCC): ICD-10-CM

## 2021-06-22 DIAGNOSIS — I10 ESSENTIAL (PRIMARY) HYPERTENSION: ICD-10-CM

## 2021-06-22 LAB
BASOPHILS # BLD AUTO: 0.06 10*3/MM3 (ref 0–0.2)
BASOPHILS NFR BLD AUTO: 0.8 % (ref 0–1.5)
DEPRECATED RDW RBC AUTO: 45.6 FL (ref 37–54)
EOSINOPHIL # BLD AUTO: 0.09 10*3/MM3 (ref 0–0.4)
EOSINOPHIL NFR BLD AUTO: 1.1 % (ref 0.3–6.2)
ERYTHROCYTE [DISTWIDTH] IN BLOOD BY AUTOMATED COUNT: 13.5 % (ref 12.3–15.4)
HBA1C MFR BLD: 7.06 % (ref 4.8–5.6)
HCT VFR BLD AUTO: 36.2 % (ref 34–46.6)
HGB BLD-MCNC: 11.7 G/DL (ref 12–15.9)
IMM GRANULOCYTES # BLD AUTO: 0.02 10*3/MM3 (ref 0–0.05)
IMM GRANULOCYTES NFR BLD AUTO: 0.3 % (ref 0–0.5)
LYMPHOCYTES # BLD AUTO: 2.93 10*3/MM3 (ref 0.7–3.1)
LYMPHOCYTES NFR BLD AUTO: 36.6 % (ref 19.6–45.3)
MCH RBC QN AUTO: 29.4 PG (ref 26.6–33)
MCHC RBC AUTO-ENTMCNC: 32.3 G/DL (ref 31.5–35.7)
MCV RBC AUTO: 91 FL (ref 79–97)
MONOCYTES # BLD AUTO: 0.88 10*3/MM3 (ref 0.1–0.9)
MONOCYTES NFR BLD AUTO: 11 % (ref 5–12)
NEUTROPHILS NFR BLD AUTO: 4.02 10*3/MM3 (ref 1.7–7)
NEUTROPHILS NFR BLD AUTO: 50.2 % (ref 42.7–76)
NRBC BLD AUTO-RTO: 0 /100 WBC (ref 0–0.2)
PLATELET # BLD AUTO: 294 10*3/MM3 (ref 140–450)
PMV BLD AUTO: 10.6 FL (ref 6–12)
RBC # BLD AUTO: 3.98 10*6/MM3 (ref 3.77–5.28)
WBC # BLD AUTO: 8 10*3/MM3 (ref 3.4–10.8)

## 2021-06-22 PROCEDURE — 83036 HEMOGLOBIN GLYCOSYLATED A1C: CPT | Performed by: NURSE PRACTITIONER

## 2021-06-22 PROCEDURE — 85025 COMPLETE CBC W/AUTO DIFF WBC: CPT | Performed by: NURSE PRACTITIONER

## 2021-06-22 PROCEDURE — 80061 LIPID PANEL: CPT | Performed by: NURSE PRACTITIONER

## 2021-06-22 PROCEDURE — 82306 VITAMIN D 25 HYDROXY: CPT | Performed by: NURSE PRACTITIONER

## 2021-06-22 PROCEDURE — 84439 ASSAY OF FREE THYROXINE: CPT | Performed by: NURSE PRACTITIONER

## 2021-06-22 PROCEDURE — 82607 VITAMIN B-12: CPT | Performed by: NURSE PRACTITIONER

## 2021-06-22 PROCEDURE — 84443 ASSAY THYROID STIM HORMONE: CPT | Performed by: NURSE PRACTITIONER

## 2021-06-22 PROCEDURE — 80053 COMPREHEN METABOLIC PANEL: CPT | Performed by: NURSE PRACTITIONER

## 2021-06-23 LAB
25(OH)D3 SERPL-MCNC: 44.5 NG/ML
ALBUMIN SERPL-MCNC: 4.1 G/DL (ref 3.5–5.2)
ALBUMIN/GLOB SERPL: 1.3 G/DL
ALP SERPL-CCNC: 65 U/L (ref 39–117)
ALT SERPL W P-5'-P-CCNC: 23 U/L (ref 1–33)
ANION GAP SERPL CALCULATED.3IONS-SCNC: 10.2 MMOL/L (ref 5–15)
AST SERPL-CCNC: 16 U/L (ref 1–32)
BILIRUB SERPL-MCNC: 0.3 MG/DL (ref 0–1.2)
BUN SERPL-MCNC: 16 MG/DL (ref 8–23)
BUN/CREAT SERPL: 22.9 (ref 7–25)
CALCIUM SPEC-SCNC: 9.7 MG/DL (ref 8.6–10.5)
CHLORIDE SERPL-SCNC: 102 MMOL/L (ref 98–107)
CHOLEST SERPL-MCNC: 151 MG/DL (ref 0–200)
CO2 SERPL-SCNC: 25.8 MMOL/L (ref 22–29)
CREAT SERPL-MCNC: 0.7 MG/DL (ref 0.57–1)
GFR SERPL CREATININE-BSD FRML MDRD: 82 ML/MIN/1.73
GFR SERPL CREATININE-BSD FRML MDRD: 99 ML/MIN/1.73
GLOBULIN UR ELPH-MCNC: 3.1 GM/DL
GLUCOSE SERPL-MCNC: 220 MG/DL (ref 65–99)
HDLC SERPL-MCNC: 44 MG/DL (ref 40–60)
LDLC SERPL CALC-MCNC: 87 MG/DL (ref 0–100)
LDLC/HDLC SERPL: 1.93 {RATIO}
POTASSIUM SERPL-SCNC: 4.4 MMOL/L (ref 3.5–5.2)
PROT SERPL-MCNC: 7.2 G/DL (ref 6–8.5)
SODIUM SERPL-SCNC: 138 MMOL/L (ref 136–145)
T4 FREE SERPL-MCNC: 1.42 NG/DL (ref 0.93–1.7)
TRIGL SERPL-MCNC: 111 MG/DL (ref 0–150)
TSH SERPL DL<=0.05 MIU/L-ACNC: 0.95 UIU/ML (ref 0.27–4.2)
VIT B12 BLD-MCNC: 381 PG/ML (ref 211–946)
VLDLC SERPL-MCNC: 20 MG/DL (ref 5–40)

## 2021-10-01 ENCOUNTER — HOSPITAL ENCOUNTER (OUTPATIENT)
Dept: BONE DENSITY | Facility: HOSPITAL | Age: 76
Discharge: HOME | End: 2021-10-01

## 2021-10-01 ENCOUNTER — HOSPITAL ENCOUNTER (OUTPATIENT)
Dept: MAMMOGRAPHY | Facility: HOSPITAL | Age: 76
Discharge: HOME | End: 2021-10-01

## 2021-10-01 DIAGNOSIS — Z12.31 SCREENING MAMMOGRAM, ENCOUNTER FOR: ICD-10-CM

## 2021-10-01 DIAGNOSIS — Z78.0 POST-MENOPAUSAL: ICD-10-CM

## 2021-10-01 PROCEDURE — 77080 DXA BONE DENSITY AXIAL: CPT

## 2021-10-01 PROCEDURE — 77067 SCR MAMMO BI INCL CAD: CPT

## 2021-10-01 PROCEDURE — 77063 BREAST TOMOSYNTHESIS BI: CPT

## 2021-10-04 ENCOUNTER — LAB REQUISITION (OUTPATIENT)
Dept: LAB | Facility: HOSPITAL | Age: 76
End: 2021-10-04

## 2021-10-04 DIAGNOSIS — E03.9 HYPOTHYROIDISM, UNSPECIFIED: ICD-10-CM

## 2021-10-04 DIAGNOSIS — I10 ESSENTIAL (PRIMARY) HYPERTENSION: ICD-10-CM

## 2021-10-04 DIAGNOSIS — E11.9 TYPE 2 DIABETES MELLITUS WITHOUT COMPLICATIONS (HCC): ICD-10-CM

## 2021-10-04 DIAGNOSIS — I25.10 ATHEROSCLEROTIC HEART DISEASE OF NATIVE CORONARY ARTERY WITHOUT ANGINA PECTORIS: ICD-10-CM

## 2021-10-04 LAB
25(OH)D3 SERPL-MCNC: 43.2 NG/ML
ALBUMIN SERPL-MCNC: 4.1 G/DL (ref 3.5–5.2)
ALBUMIN/GLOB SERPL: 1.2 G/DL
ALP SERPL-CCNC: 74 U/L (ref 39–117)
ALT SERPL W P-5'-P-CCNC: 19 U/L (ref 1–33)
ANION GAP SERPL CALCULATED.3IONS-SCNC: 11.1 MMOL/L (ref 5–15)
AST SERPL-CCNC: 20 U/L (ref 1–32)
BACTERIA UR QL AUTO: ABNORMAL /HPF
BASOPHILS # BLD AUTO: 0.07 10*3/MM3 (ref 0–0.2)
BASOPHILS NFR BLD AUTO: 0.7 % (ref 0–1.5)
BILIRUB SERPL-MCNC: 0.3 MG/DL (ref 0–1.2)
BILIRUB UR QL STRIP: NEGATIVE
BUN SERPL-MCNC: 14 MG/DL (ref 8–23)
BUN/CREAT SERPL: 19.4 (ref 7–25)
CALCIUM SPEC-SCNC: 9.6 MG/DL (ref 8.6–10.5)
CHLORIDE SERPL-SCNC: 102 MMOL/L (ref 98–107)
CHOLEST SERPL-MCNC: 140 MG/DL (ref 0–200)
CLARITY UR: CLEAR
CO2 SERPL-SCNC: 24.9 MMOL/L (ref 22–29)
COLOR UR: YELLOW
CREAT SERPL-MCNC: 0.72 MG/DL (ref 0.57–1)
DEPRECATED RDW RBC AUTO: 45.8 FL (ref 37–54)
EOSINOPHIL # BLD AUTO: 0.13 10*3/MM3 (ref 0–0.4)
EOSINOPHIL NFR BLD AUTO: 1.3 % (ref 0.3–6.2)
ERYTHROCYTE [DISTWIDTH] IN BLOOD BY AUTOMATED COUNT: 13.8 % (ref 12.3–15.4)
FOLATE SERPL-MCNC: 18.9 NG/ML (ref 4.78–24.2)
GFR SERPL CREATININE-BSD FRML MDRD: 79 ML/MIN/1.73
GFR SERPL CREATININE-BSD FRML MDRD: 96 ML/MIN/1.73
GLOBULIN UR ELPH-MCNC: 3.3 GM/DL
GLUCOSE SERPL-MCNC: 117 MG/DL (ref 65–99)
GLUCOSE UR STRIP-MCNC: ABNORMAL MG/DL
HBA1C MFR BLD: 8.89 % (ref 4.8–5.6)
HCT VFR BLD AUTO: 35.9 % (ref 34–46.6)
HDLC SERPL-MCNC: 49 MG/DL (ref 40–60)
HGB BLD-MCNC: 11.2 G/DL (ref 12–15.9)
HGB UR QL STRIP.AUTO: NEGATIVE
HYALINE CASTS UR QL AUTO: ABNORMAL /LPF
IMM GRANULOCYTES # BLD AUTO: 0.04 10*3/MM3 (ref 0–0.05)
IMM GRANULOCYTES NFR BLD AUTO: 0.4 % (ref 0–0.5)
KETONES UR QL STRIP: NEGATIVE
LDLC SERPL CALC-MCNC: 75 MG/DL (ref 0–100)
LDLC/HDLC SERPL: 1.52 {RATIO}
LEUKOCYTE ESTERASE UR QL STRIP.AUTO: ABNORMAL
LYMPHOCYTES # BLD AUTO: 2.87 10*3/MM3 (ref 0.7–3.1)
LYMPHOCYTES NFR BLD AUTO: 28.7 % (ref 19.6–45.3)
MCH RBC QN AUTO: 28.3 PG (ref 26.6–33)
MCHC RBC AUTO-ENTMCNC: 31.2 G/DL (ref 31.5–35.7)
MCV RBC AUTO: 90.7 FL (ref 79–97)
MONOCYTES # BLD AUTO: 1.04 10*3/MM3 (ref 0.1–0.9)
MONOCYTES NFR BLD AUTO: 10.4 % (ref 5–12)
MUCOUS THREADS URNS QL MICRO: ABNORMAL /HPF
NEUTROPHILS NFR BLD AUTO: 5.84 10*3/MM3 (ref 1.7–7)
NEUTROPHILS NFR BLD AUTO: 58.5 % (ref 42.7–76)
NITRITE UR QL STRIP: NEGATIVE
NRBC BLD AUTO-RTO: 0 /100 WBC (ref 0–0.2)
PH UR STRIP.AUTO: 6 [PH] (ref 5–8)
PLATELET # BLD AUTO: 325 10*3/MM3 (ref 140–450)
PMV BLD AUTO: 10.5 FL (ref 6–12)
POTASSIUM SERPL-SCNC: 4.3 MMOL/L (ref 3.5–5.2)
PROT SERPL-MCNC: 7.4 G/DL (ref 6–8.5)
PROT UR QL STRIP: NEGATIVE
RBC # BLD AUTO: 3.96 10*6/MM3 (ref 3.77–5.28)
RBC # UR: ABNORMAL /HPF
REF LAB TEST METHOD: ABNORMAL
SODIUM SERPL-SCNC: 138 MMOL/L (ref 136–145)
SP GR UR STRIP: 1.02 (ref 1–1.03)
SQUAMOUS #/AREA URNS HPF: ABNORMAL /HPF
T4 FREE SERPL-MCNC: 0.88 NG/DL (ref 0.93–1.7)
TRIGL SERPL-MCNC: 83 MG/DL (ref 0–150)
TSH SERPL DL<=0.05 MIU/L-ACNC: 0.99 UIU/ML (ref 0.27–4.2)
UROBILINOGEN UR QL STRIP: ABNORMAL
VIT B12 BLD-MCNC: 372 PG/ML (ref 211–946)
VLDLC SERPL-MCNC: 16 MG/DL (ref 5–40)
WBC # BLD AUTO: 9.99 10*3/MM3 (ref 3.4–10.8)
WBC UR QL AUTO: ABNORMAL /HPF

## 2021-10-04 PROCEDURE — 81001 URINALYSIS AUTO W/SCOPE: CPT | Performed by: NURSE PRACTITIONER

## 2021-10-04 PROCEDURE — 80053 COMPREHEN METABOLIC PANEL: CPT | Performed by: NURSE PRACTITIONER

## 2021-10-04 PROCEDURE — 84443 ASSAY THYROID STIM HORMONE: CPT | Performed by: NURSE PRACTITIONER

## 2021-10-04 PROCEDURE — 85025 COMPLETE CBC W/AUTO DIFF WBC: CPT | Performed by: NURSE PRACTITIONER

## 2021-10-04 PROCEDURE — 84439 ASSAY OF FREE THYROXINE: CPT | Performed by: NURSE PRACTITIONER

## 2021-10-04 PROCEDURE — 80061 LIPID PANEL: CPT | Performed by: NURSE PRACTITIONER

## 2021-10-04 PROCEDURE — 82607 VITAMIN B-12: CPT | Performed by: NURSE PRACTITIONER

## 2021-10-04 PROCEDURE — 82746 ASSAY OF FOLIC ACID SERUM: CPT | Performed by: NURSE PRACTITIONER

## 2021-10-04 PROCEDURE — 83036 HEMOGLOBIN GLYCOSYLATED A1C: CPT | Performed by: NURSE PRACTITIONER

## 2021-10-04 PROCEDURE — 82306 VITAMIN D 25 HYDROXY: CPT | Performed by: NURSE PRACTITIONER

## 2021-10-26 PROCEDURE — 87086 URINE CULTURE/COLONY COUNT: CPT | Performed by: FAMILY MEDICINE

## 2021-10-28 ENCOUNTER — TELEPHONE (OUTPATIENT)
Dept: URGENT CARE | Facility: CLINIC | Age: 76
End: 2021-10-28

## 2021-10-28 NOTE — TELEPHONE ENCOUNTER
----- Message from CESAR Hill sent at 10/28/2021  1:36 PM EDT -----  Please call the patient regarding her normal result.    Urine culture is negative.  The patient is continuing to have symptoms that should follow-up with her primary care provider.

## 2021-10-29 ENCOUNTER — TELEPHONE (OUTPATIENT)
Dept: URGENT CARE | Facility: CLINIC | Age: 76
End: 2021-10-29

## 2021-10-30 ENCOUNTER — TELEPHONE (OUTPATIENT)
Dept: URGENT CARE | Facility: CLINIC | Age: 76
End: 2021-10-30

## 2021-10-31 ENCOUNTER — APPOINTMENT (OUTPATIENT)
Dept: CT IMAGING | Facility: HOSPITAL | Age: 76
End: 2021-10-31

## 2021-10-31 ENCOUNTER — HOSPITAL ENCOUNTER (EMERGENCY)
Facility: HOSPITAL | Age: 76
Discharge: HOME | End: 2021-10-31
Attending: EMERGENCY MEDICINE | Admitting: EMERGENCY MEDICINE

## 2021-10-31 VITALS
SYSTOLIC BLOOD PRESSURE: 125 MMHG | OXYGEN SATURATION: 99 % | DIASTOLIC BLOOD PRESSURE: 70 MMHG | WEIGHT: 94.58 LBS | BODY MASS INDEX: 18.57 KG/M2 | HEART RATE: 95 BPM | TEMPERATURE: 98.3 F | HEIGHT: 60 IN | RESPIRATION RATE: 18 BRPM

## 2021-10-31 DIAGNOSIS — K29.00 ACUTE SUPERFICIAL GASTRITIS WITHOUT HEMORRHAGE: ICD-10-CM

## 2021-10-31 DIAGNOSIS — E86.0 DEHYDRATION: ICD-10-CM

## 2021-10-31 DIAGNOSIS — R11.2 NON-INTRACTABLE VOMITING WITH NAUSEA, UNSPECIFIED VOMITING TYPE: Primary | ICD-10-CM

## 2021-10-31 LAB
ALBUMIN SERPL-MCNC: 4.2 G/DL (ref 3.5–5.2)
ALBUMIN/GLOB SERPL: 1.2 G/DL
ALP SERPL-CCNC: 65 U/L (ref 39–117)
ALT SERPL W P-5'-P-CCNC: 21 U/L (ref 1–33)
ANION GAP SERPL CALCULATED.3IONS-SCNC: 14.6 MMOL/L (ref 5–15)
AST SERPL-CCNC: 20 U/L (ref 1–32)
BASOPHILS # BLD AUTO: 0.06 10*3/MM3 (ref 0–0.2)
BASOPHILS NFR BLD AUTO: 0.6 % (ref 0–1.5)
BILIRUB SERPL-MCNC: 0.5 MG/DL (ref 0–1.2)
BUN SERPL-MCNC: 24 MG/DL (ref 8–23)
BUN/CREAT SERPL: 23.3 (ref 7–25)
CALCIUM SPEC-SCNC: 9.9 MG/DL (ref 8.6–10.5)
CHLORIDE SERPL-SCNC: 101 MMOL/L (ref 98–107)
CO2 SERPL-SCNC: 20.4 MMOL/L (ref 22–29)
CREAT SERPL-MCNC: 1.03 MG/DL (ref 0.57–1)
DEPRECATED RDW RBC AUTO: 44.9 FL (ref 37–54)
EOSINOPHIL # BLD AUTO: 0.07 10*3/MM3 (ref 0–0.4)
EOSINOPHIL NFR BLD AUTO: 0.7 % (ref 0.3–6.2)
ERYTHROCYTE [DISTWIDTH] IN BLOOD BY AUTOMATED COUNT: 14.5 % (ref 12.3–15.4)
GFR SERPL CREATININE-BSD FRML MDRD: 52 ML/MIN/1.73
GFR SERPL CREATININE-BSD FRML MDRD: 63 ML/MIN/1.73
GLOBULIN UR ELPH-MCNC: 3.4 GM/DL
GLUCOSE SERPL-MCNC: 208 MG/DL (ref 65–99)
HCT VFR BLD AUTO: 32.5 % (ref 34–46.6)
HGB BLD-MCNC: 11 G/DL (ref 12–15.9)
HOLD SPECIMEN: NORMAL
HOLD SPECIMEN: NORMAL
IMM GRANULOCYTES # BLD AUTO: 0.03 10*3/MM3 (ref 0–0.05)
IMM GRANULOCYTES NFR BLD AUTO: 0.3 % (ref 0–0.5)
LIPASE SERPL-CCNC: 17 U/L (ref 13–60)
LYMPHOCYTES # BLD AUTO: 2.73 10*3/MM3 (ref 0.7–3.1)
LYMPHOCYTES NFR BLD AUTO: 26.8 % (ref 19.6–45.3)
MCH RBC QN AUTO: 29.1 PG (ref 26.6–33)
MCHC RBC AUTO-ENTMCNC: 33.8 G/DL (ref 31.5–35.7)
MCV RBC AUTO: 86 FL (ref 79–97)
MONOCYTES # BLD AUTO: 0.94 10*3/MM3 (ref 0.1–0.9)
MONOCYTES NFR BLD AUTO: 9.2 % (ref 5–12)
NEUTROPHILS NFR BLD AUTO: 6.36 10*3/MM3 (ref 1.7–7)
NEUTROPHILS NFR BLD AUTO: 62.4 % (ref 42.7–76)
NRBC BLD AUTO-RTO: 0 /100 WBC (ref 0–0.2)
PLATELET # BLD AUTO: 355 10*3/MM3 (ref 140–450)
PMV BLD AUTO: 10.2 FL (ref 6–12)
POTASSIUM SERPL-SCNC: 4.2 MMOL/L (ref 3.5–5.2)
PROT SERPL-MCNC: 7.6 G/DL (ref 6–8.5)
RBC # BLD AUTO: 3.78 10*6/MM3 (ref 3.77–5.28)
SODIUM SERPL-SCNC: 136 MMOL/L (ref 136–145)
WBC # BLD AUTO: 10.19 10*3/MM3 (ref 3.4–10.8)
WHOLE BLOOD HOLD SPECIMEN: NORMAL
WHOLE BLOOD HOLD SPECIMEN: NORMAL

## 2021-10-31 PROCEDURE — 96374 THER/PROPH/DIAG INJ IV PUSH: CPT

## 2021-10-31 PROCEDURE — 85025 COMPLETE CBC W/AUTO DIFF WBC: CPT | Performed by: EMERGENCY MEDICINE

## 2021-10-31 PROCEDURE — 36415 COLL VENOUS BLD VENIPUNCTURE: CPT | Performed by: EMERGENCY MEDICINE

## 2021-10-31 PROCEDURE — 25010000002 ONDANSETRON PER 1 MG: Performed by: EMERGENCY MEDICINE

## 2021-10-31 PROCEDURE — 83690 ASSAY OF LIPASE: CPT | Performed by: EMERGENCY MEDICINE

## 2021-10-31 PROCEDURE — 0 IOPAMIDOL PER 1 ML: Performed by: EMERGENCY MEDICINE

## 2021-10-31 PROCEDURE — 74177 CT ABD & PELVIS W/CONTRAST: CPT

## 2021-10-31 PROCEDURE — 80053 COMPREHEN METABOLIC PANEL: CPT | Performed by: EMERGENCY MEDICINE

## 2021-10-31 PROCEDURE — 99283 EMERGENCY DEPT VISIT LOW MDM: CPT

## 2021-10-31 RX ORDER — ONDANSETRON 2 MG/ML
4 INJECTION INTRAMUSCULAR; INTRAVENOUS ONCE
Status: COMPLETED | OUTPATIENT
Start: 2021-10-31 | End: 2021-10-31

## 2021-10-31 RX ORDER — SODIUM CHLORIDE 0.9 % (FLUSH) 0.9 %
10 SYRINGE (ML) INJECTION AS NEEDED
Status: DISCONTINUED | OUTPATIENT
Start: 2021-10-31 | End: 2021-10-31 | Stop reason: HOSPADM

## 2021-10-31 RX ORDER — ONDANSETRON 4 MG/1
4 TABLET, ORALLY DISINTEGRATING ORAL EVERY 6 HOURS PRN
Qty: 15 TABLET | Refills: 0 | Status: SHIPPED | OUTPATIENT
Start: 2021-10-31

## 2021-10-31 RX ORDER — LIDOCAINE HYDROCHLORIDE 20 MG/ML
15 SOLUTION OROPHARYNGEAL ONCE
Status: COMPLETED | OUTPATIENT
Start: 2021-10-31 | End: 2021-10-31

## 2021-10-31 RX ORDER — ALUMINA, MAGNESIA, AND SIMETHICONE 2400; 2400; 240 MG/30ML; MG/30ML; MG/30ML
15 SUSPENSION ORAL ONCE
Status: COMPLETED | OUTPATIENT
Start: 2021-10-31 | End: 2021-10-31

## 2021-10-31 RX ADMIN — SODIUM CHLORIDE 1000 ML: 9 INJECTION, SOLUTION INTRAVENOUS at 18:10

## 2021-10-31 RX ADMIN — IOPAMIDOL 100 ML: 755 INJECTION, SOLUTION INTRAVENOUS at 17:25

## 2021-10-31 RX ADMIN — ONDANSETRON 4 MG: 2 INJECTION INTRAMUSCULAR; INTRAVENOUS at 18:10

## 2021-10-31 RX ADMIN — ALUMINUM HYDROXIDE, MAGNESIUM HYDROXIDE, AND DIMETHICONE 15 ML: 400; 400; 40 SUSPENSION ORAL at 18:10

## 2021-10-31 RX ADMIN — LIDOCAINE HYDROCHLORIDE 15 ML: 20 SOLUTION ORAL; TOPICAL at 18:10

## 2021-11-01 ENCOUNTER — TELEPHONE (OUTPATIENT)
Dept: GASTROENTEROLOGY | Facility: CLINIC | Age: 76
End: 2021-11-01

## 2021-11-01 NOTE — TELEPHONE ENCOUNTER
Patient has been scheduled for 4 tomorrow. Katharina states she will notify her caretaker and make sure she comes to translate.

## 2021-11-01 NOTE — TELEPHONE ENCOUNTER
Caretaker call and the pt was in the ERD , with nausea and vomiting , pt is not able to hold any food down they are wanting to get her in ASAP , ERD they refer her back to GI for an f/u

## 2021-11-02 ENCOUNTER — OFFICE VISIT (OUTPATIENT)
Dept: GASTROENTEROLOGY | Facility: CLINIC | Age: 76
End: 2021-11-02

## 2021-11-02 VITALS
DIASTOLIC BLOOD PRESSURE: 43 MMHG | HEART RATE: 108 BPM | WEIGHT: 96.78 LBS | BODY MASS INDEX: 19 KG/M2 | HEIGHT: 60 IN | SYSTOLIC BLOOD PRESSURE: 147 MMHG

## 2021-11-02 DIAGNOSIS — R19.7 DIARRHEA, UNSPECIFIED TYPE: ICD-10-CM

## 2021-11-02 DIAGNOSIS — R11.2 NAUSEA AND VOMITING, INTRACTABILITY OF VOMITING NOT SPECIFIED, UNSPECIFIED VOMITING TYPE: Primary | ICD-10-CM

## 2021-11-02 PROCEDURE — 99213 OFFICE O/P EST LOW 20 MIN: CPT | Performed by: INTERNAL MEDICINE

## 2021-11-02 RX ORDER — SUCRALFATE 1 G/1
1 TABLET ORAL 4 TIMES DAILY
Qty: 56 TABLET | Refills: 0 | Status: SHIPPED | OUTPATIENT
Start: 2021-11-02

## 2021-11-02 RX ORDER — PANTOPRAZOLE SODIUM 40 MG/1
TABLET, DELAYED RELEASE ORAL
COMMUNITY
Start: 2021-10-25

## 2021-11-02 NOTE — PROGRESS NOTES
Patient Name: Gwendolyn Gamble   Visit Date: 11/02/2021   Patient ID: [id number]  Provider: Kathleen Gómez MD    Sex: female  Location: [facility]   YOB: 1945  [facility] [place] [address]  [place],?[place]?[address]    Primary Care Provider Mckenna Irving APRN  Location Phone: [phone]   Referring Provider: No ref. provider found        Chief Complaint  Abdominal Pain (feels like food getting stuck), Nausea, and Vomiting    History of Present Illness  Gwendolyn Gamble is a 76 y.o. female who presents to [facility] for evaluation of nausea, vomiting.    Ms. Patel is a 76 year old female who presents for f/u of dysphagia, constipation.  She is [place] speaking.  Her caregiver assisted with translating.  Daughter-in-law is also present.  No dysphagia.  For the last 2 weeks, has had issues with nausea, vomiting, and diarrhea.  Diarrhea has mostly resolved.  She was dx with UTI and prescribed an antibiotic.  She will not take the antibiotic b/c it makes her stomach feel worse.  He caregiver reports they have talked with her PCP, and they are planning on changing the antibiotic to a capsule.  She recently was started on Elavil.  Reglan recently prescribed for nausea, vomiting.  No melena, hematochezia. No reflux.    GES with delayed gastric emptying.     FH: sister-throat cancer    EGD (4/20/2021): Erythema of the GE junction, erythema in the gastric antrum and body, normal duodenum.  Midesophagus biopsies normal, GE junction biopsy normal, gastric antrum biopsy with mild chronic inactive gastritis.    Colonoscopy (4/20/2021): Mild sigmoid colon diverticulosis, 3 3 to 6 mm ascending colon polyps removed (TA), hemorrhoids.  Recommend repeat colonoscopy in 3 years.      Past Medical History:   Diagnosis Date   • Anxiety and depression    • Diabetes mellitus (HCC)     TYPE 2   • GERD (gastroesophageal  reflux disease)    • Hx of right BKA (HCC)     USES PROSTHESIS   • Hyperlipidemia     PREVENTATIVE   • Hypertension    • Hypothyroidism    • Insomnia    • Nausea     DOESNT DIGEST FOOD WELL   • Osteoporosis     BACK AND HIPS   • Peripheral vascular disease (HCC)    • Poor circulation of extremity    • Post-menopausal    • Swallowing difficulty        Past Surgical History:   Procedure Laterality Date   • ABOVE KNEE AMPUTATION Left 11/27/2020    Procedure: AMPUTATION ABOVE KNEE;  Surgeon: Tanisha Romero Jr., MD;  Location: [facility];  Service: Vascular;  Laterality: Left;   • AMPUTATION      RBKA   • ANGIOPLASTY FEMORAL ARTERY Left 11/4/2019    Procedure: AORTIC ILIAC FEMORAL LEFT LOWER EXTREMITY RUNOFF LEFT SUPERFICIAL FEMORAL ARTERY/POP PERCONTANEOUS TRANSLUMINAL ANGIOPLASTY;  Surgeon: Tanisha Romero Jr., MD;  Location: [facility] OR 18/19;  Service: Vascular   • ANGIOPLASTY FEMORAL ARTERY Left 2/24/2020    Procedure: AORTO ILIAC FEMORAL WITH LEFT SUPERFICIAL FEMORAL ARTERY AND LEFT COMMON ILIAC PERCUTANEOUS TRANSLUMINAL ANGIOPLASTY STENT;  Surgeon: Tanisha Romero Jr., MD;  Location: [facility] OR 18/19;  Service: Vascular;  Laterality: Left;   • CATARACT EXTRACTION, BILATERAL     • COLONOSCOPY      03/2021 [facility]   • EYE SURGERY     • FEMORAL TIBIAL BYPASS Left 4/13/2020    Procedure: LEFT FEMORAL TIBIAL BYPASS USING TRANSLOCATED GREATER SAPHENOUS VEIN;  Surgeon: Tanisha Romero Jr., MD;  Location: [facility];  Service: Vascular;  Laterality: Left;   • UPPER GASTROINTESTINAL ENDOSCOPY     • VASCULAR SURGERY      IN THE PAST [place]         Current Outpatient Medications:   •  amitriptyline (ELAVIL) 25 MG tablet, 1 tab(s), Disp: , Rfl:   •  amLODIPine (NORVASC) 2.5 MG tablet, Take 1 tablet by mouth Daily., Disp:  , Rfl:   •  atorvastatin (LIPITOR) 20 MG tablet, Take 20 mg by mouth Daily., Disp: , Rfl:   •  busPIRone (BUSPAR) 10 MG tablet, Take 10 mg by mouth 2 (two) times  a day., Disp: , Rfl:   •  clopidogrel (Plavix) 75 MG tablet, Daily., Disp: , Rfl:   •  famotidine (PEPCID) 20 MG tablet, Take 1 tablet by mouth 2 (Two) Times a Day., Disp: , Rfl:   •  Insulin Degludec (TRESIBA FLEXTOUCH SC), Inject 25 Units under the skin into the appropriate area as directed Every Night., Disp: , Rfl:   •  levothyroxine (SYNTHROID, LEVOTHROID) 75 MCG tablet, Take 0.5 tablets by mouth Daily., Disp:  , Rfl:   •  losartan (COZAAR) 50 MG tablet, Take 50 mg by mouth Daily., Disp: , Rfl:   •  metFORMIN (GLUCOPHAGE) 850 MG tablet, Take 1,000 mg by mouth 2 (Two) Times a Day With Meals., Disp: , Rfl:   •  metoclopramide (REGLAN) 5 MG tablet, Take 1 tablet by mouth 3 (Three) Times a Day As Needed (with meals)., Disp: , Rfl:   •  ondansetron ODT (ZOFRAN-ODT) 4 MG disintegrating tablet, Place 1 tablet on the tongue Every 6 (Six) Hours As Needed for Nausea or Vomiting., Disp: 15 tablet, Rfl: 0  •  pantoprazole (PROTONIX) 40 MG EC tablet, , Disp: , Rfl:   •  SITagliptin (JANUVIA) 100 MG tablet, Take 100 mg by mouth Daily., Disp: , Rfl:   •  gabapentin (NEURONTIN) 400 MG capsule, Take 1 capsule by mouth 3 (Three) Times a Day for 3 days., Disp: 9 capsule, Rfl: 0  •  sucralfate (CARAFATE) 1 g tablet, Take 1 tablet by mouth 4 (Four) Times a Day., Disp: 56 tablet, Rfl: 0     Allergies   Allergen Reactions   • Aspirin Anaphylaxis       Family History   Problem Relation Age of Onset   • Colon polyps Sister    • Malig Hyperthermia Neg Hx         Social History     Social History Narrative   • Not on file       Objective     Review of Systems   Constitutional: Negative for chills, fever, unexpected weight gain and unexpected weight loss.   Respiratory: Negative for cough and shortness of breath.    Cardiovascular: Negative for chest pain.   Gastrointestinal:        Positive for *See HPI*   Skin: Skin lesions: No new lesions.   Neurological: Negative for numbness (or tingling).        Vital Signs:   /43 (BP  "Location: Left arm, Patient Position: Sitting, Cuff Size: Adult)   Pulse 108   Ht 152.4 cm (60\")   Wt 43.9 kg (96 lb 12.5 oz)   BMI 18.90 kg/m²       Physical Exam  Constitutional:       General: She is not in acute distress.     Appearance: Normal appearance.   HENT:      Head: Normocephalic.   Eyes:      Conjunctiva/sclera: Conjunctivae normal.      Pupils: Pupils are equal, round, and reactive to light.      Visual Fields: Right eye visual fields normal and left eye visual fields normal.   Neck:      Trachea: Trachea normal.   Cardiovascular:      Rate and Rhythm: Normal rate and regular rhythm.      Heart sounds: Normal heart sounds.   Pulmonary:      Effort: Pulmonary effort is normal.      Breath sounds: Normal breath sounds and air entry.   Abdominal:      General: Abdomen is flat. Bowel sounds are normal.      Palpations: Abdomen is soft. There is no mass.      Tenderness: There is no guarding.   Musculoskeletal:      Right lower leg: No edema.      Left lower leg: No edema.   Skin:     Findings: No lesion.   Neurological:      Mental Status: She is alert and oriented to person, place, and time.   Psychiatric:         Mood and Affect: Mood and affect normal.         Result Review :   The following data was reviewed by: Kathleen Gómez MD on 11/02/2021:  CMP    CMP 6/22/21 10/4/21 10/31/21   Glucose 220 (A) 117 (A) 208 (A)   BUN 16 14 24 (A)   Creatinine 0.70 0.72 1.03 (A)   eGFR Non  Am 82 79 52 (A)   eGFR  Am 99 96 63   Sodium 138 138 136   Potassium 4.4 4.3 4.2   Chloride 102 102 101   Calcium 9.7 9.6 9.9   Albumin 4.10 4.10 4.20   Total Bilirubin 0.3 0.3 0.5   Alkaline Phosphatase 65 74 65   AST (SGOT) 16 20 20   ALT (SGPT) 23 19 21   (A) Abnormal value            CBC    CBC 6/22/21 10/4/21 10/31/21   WBC 8.00 9.99 10.19   RBC 3.98 3.96 3.78   Hemoglobin 11.7 (A) 11.2 (A) 11.0 (A)   Hematocrit 36.2 35.9 32.5 (A)   MCV 91.0 90.7 86.0   MCH 29.4 28.3 29.1   MCHC 32.3 31.2 (A) 33.8 "   RDW 13.5 13.8 14.5   Platelets 294 325 355   (A) Abnormal value            Data reviewed: GI studies EGD/colonoscopy           Assessment and Plan    Diagnoses and all orders for this visit:    1. Nausea and vomiting, intractability of vomiting not specified, unspecified vomiting type (Primary)    2. Diarrhea, unspecified type    Other orders  -     sucralfate (CARAFATE) 1 g tablet; Take 1 tablet by mouth 4 (Four) Times a Day.  Dispense: 56 tablet; Refill: 0    Will start carafate for nausea, vomiting and possible gastritis.  Can take for 2 weeks then stop if improved.  Advised for patient to take prescribed antibiotic recommended by PCP for her UTI as a UTI can cause nausea, vomiting.  Diarrhea has mostly resolved.  Pt/caregiver advise to call office if diarrhea resumes.  Will order stool studies at that time if indicated.      Follow Up   No follow-ups on file.  Patient was given instructions and counseling regarding her condition or for health maintenance advice. Please see specific information pulled into the AVS if appropriate.              Kathleen Gómez M.D.  [place] [address] [place]  [address]  Office: [phone]

## 2021-11-23 ENCOUNTER — LAB REQUISITION (OUTPATIENT)
Dept: LAB | Facility: HOSPITAL | Age: 76
End: 2021-11-23

## 2021-11-23 DIAGNOSIS — N39.0 URINARY TRACT INFECTION, SITE NOT SPECIFIED: ICD-10-CM

## 2021-11-23 LAB
BILIRUB UR QL STRIP: NEGATIVE
CLARITY UR: CLEAR
COLOR UR: YELLOW
GLUCOSE UR STRIP-MCNC: ABNORMAL MG/DL
HGB UR QL STRIP.AUTO: NEGATIVE
KETONES UR QL STRIP: NEGATIVE
LEUKOCYTE ESTERASE UR QL STRIP.AUTO: NEGATIVE
NITRITE UR QL STRIP: NEGATIVE
PH UR STRIP.AUTO: 5.5 [PH] (ref 5–8)
PROT UR QL STRIP: NEGATIVE
SP GR UR STRIP: >=1.03 (ref 1–1.03)
UROBILINOGEN UR QL STRIP: ABNORMAL

## 2021-11-23 PROCEDURE — 81003 URINALYSIS AUTO W/O SCOPE: CPT | Performed by: NURSE PRACTITIONER

## 2021-11-23 PROCEDURE — 87086 URINE CULTURE/COLONY COUNT: CPT | Performed by: NURSE PRACTITIONER

## 2021-11-24 LAB — BACTERIA SPEC AEROBE CULT: NO GROWTH

## 2022-01-19 ENCOUNTER — LAB REQUISITION (OUTPATIENT)
Dept: LAB | Facility: HOSPITAL | Age: 77
End: 2022-01-19

## 2022-01-19 DIAGNOSIS — E11.9 TYPE 2 DIABETES MELLITUS WITHOUT COMPLICATIONS: ICD-10-CM

## 2022-01-19 DIAGNOSIS — R11.2 NAUSEA WITH VOMITING, UNSPECIFIED: ICD-10-CM

## 2022-01-19 DIAGNOSIS — I25.10 ATHEROSCLEROTIC HEART DISEASE OF NATIVE CORONARY ARTERY WITHOUT ANGINA PECTORIS: ICD-10-CM

## 2022-01-19 DIAGNOSIS — Z79.899 OTHER LONG TERM (CURRENT) DRUG THERAPY: ICD-10-CM

## 2022-01-19 DIAGNOSIS — I10 ESSENTIAL (PRIMARY) HYPERTENSION: ICD-10-CM

## 2022-01-19 LAB
25(OH)D3 SERPL-MCNC: 42.6 NG/ML (ref 30–100)
ALBUMIN SERPL-MCNC: 4.2 G/DL (ref 3.5–5.2)
ALBUMIN/GLOB SERPL: 1.2 G/DL
ALP SERPL-CCNC: 70 U/L (ref 39–117)
ALT SERPL W P-5'-P-CCNC: 19 U/L (ref 1–33)
ANION GAP SERPL CALCULATED.3IONS-SCNC: 13.2 MMOL/L (ref 5–15)
AST SERPL-CCNC: 18 U/L (ref 1–32)
BASOPHILS # BLD AUTO: 0.06 10*3/MM3 (ref 0–0.2)
BASOPHILS NFR BLD AUTO: 0.5 % (ref 0–1.5)
BILIRUB SERPL-MCNC: 0.4 MG/DL (ref 0–1.2)
BUN SERPL-MCNC: 11 MG/DL (ref 8–23)
BUN/CREAT SERPL: 16.7 (ref 7–25)
CALCIUM SPEC-SCNC: 9.8 MG/DL (ref 8.6–10.5)
CHLORIDE SERPL-SCNC: 102 MMOL/L (ref 98–107)
CHOLEST SERPL-MCNC: 107 MG/DL (ref 0–200)
CO2 SERPL-SCNC: 24.8 MMOL/L (ref 22–29)
CREAT SERPL-MCNC: 0.66 MG/DL (ref 0.57–1)
DEPRECATED RDW RBC AUTO: 43.9 FL (ref 37–54)
EOSINOPHIL # BLD AUTO: 0.06 10*3/MM3 (ref 0–0.4)
EOSINOPHIL NFR BLD AUTO: 0.5 % (ref 0.3–6.2)
ERYTHROCYTE [DISTWIDTH] IN BLOOD BY AUTOMATED COUNT: 14.3 % (ref 12.3–15.4)
FOLATE SERPL-MCNC: 15 NG/ML (ref 4.78–24.2)
GFR SERPL CREATININE-BSD FRML MDRD: 106 ML/MIN/1.73
GFR SERPL CREATININE-BSD FRML MDRD: 88 ML/MIN/1.73
GLOBULIN UR ELPH-MCNC: 3.5 GM/DL
GLUCOSE SERPL-MCNC: 221 MG/DL (ref 65–99)
HBA1C MFR BLD: 11.37 % (ref 4.8–5.6)
HCT VFR BLD AUTO: 33 % (ref 34–46.6)
HDLC SERPL-MCNC: 41 MG/DL (ref 40–60)
HGB BLD-MCNC: 11.1 G/DL (ref 12–15.9)
IMM GRANULOCYTES # BLD AUTO: 0.04 10*3/MM3 (ref 0–0.05)
IMM GRANULOCYTES NFR BLD AUTO: 0.4 % (ref 0–0.5)
LDLC SERPL CALC-MCNC: 49 MG/DL (ref 0–100)
LDLC/HDLC SERPL: 1.18 {RATIO}
LYMPHOCYTES # BLD AUTO: 2.35 10*3/MM3 (ref 0.7–3.1)
LYMPHOCYTES NFR BLD AUTO: 21.1 % (ref 19.6–45.3)
MCH RBC QN AUTO: 28.5 PG (ref 26.6–33)
MCHC RBC AUTO-ENTMCNC: 33.6 G/DL (ref 31.5–35.7)
MCV RBC AUTO: 84.8 FL (ref 79–97)
MONOCYTES # BLD AUTO: 1.11 10*3/MM3 (ref 0.1–0.9)
MONOCYTES NFR BLD AUTO: 10 % (ref 5–12)
NEUTROPHILS NFR BLD AUTO: 67.5 % (ref 42.7–76)
NEUTROPHILS NFR BLD AUTO: 7.52 10*3/MM3 (ref 1.7–7)
NRBC BLD AUTO-RTO: 0 /100 WBC (ref 0–0.2)
PLATELET # BLD AUTO: 332 10*3/MM3 (ref 140–450)
PMV BLD AUTO: 11.1 FL (ref 6–12)
POTASSIUM SERPL-SCNC: 4.1 MMOL/L (ref 3.5–5.2)
PROT SERPL-MCNC: 7.7 G/DL (ref 6–8.5)
RBC # BLD AUTO: 3.89 10*6/MM3 (ref 3.77–5.28)
SODIUM SERPL-SCNC: 140 MMOL/L (ref 136–145)
T4 FREE SERPL-MCNC: 1.62 NG/DL (ref 0.93–1.7)
TRIGL SERPL-MCNC: 89 MG/DL (ref 0–150)
TSH SERPL DL<=0.05 MIU/L-ACNC: 1.67 UIU/ML (ref 0.27–4.2)
VIT B12 BLD-MCNC: 488 PG/ML (ref 211–946)
VLDLC SERPL-MCNC: 17 MG/DL (ref 5–40)
WBC NRBC COR # BLD: 11.14 10*3/MM3 (ref 3.4–10.8)

## 2022-01-19 PROCEDURE — 80053 COMPREHEN METABOLIC PANEL: CPT

## 2022-01-19 PROCEDURE — 82746 ASSAY OF FOLIC ACID SERUM: CPT

## 2022-01-19 PROCEDURE — 84443 ASSAY THYROID STIM HORMONE: CPT

## 2022-01-19 PROCEDURE — 84439 ASSAY OF FREE THYROXINE: CPT

## 2022-01-19 PROCEDURE — 83036 HEMOGLOBIN GLYCOSYLATED A1C: CPT

## 2022-01-19 PROCEDURE — 80061 LIPID PANEL: CPT

## 2022-01-19 PROCEDURE — 85025 COMPLETE CBC W/AUTO DIFF WBC: CPT

## 2022-01-19 PROCEDURE — 82607 VITAMIN B-12: CPT

## 2022-01-19 PROCEDURE — 82306 VITAMIN D 25 HYDROXY: CPT

## 2023-02-08 NOTE — PLAN OF CARE
Goal Outcome Evaluation:  Plan of Care Reviewed With: patient  Progress: improving   Patient is resting well this shift, with some intermittent c/o Left thigh incision pain. Left thigh and incision area cleansed with antimicrobial soap, rinsed with water and saline, painted with betadine,then dressed with a kerlix and ace wrap. She is alert, pleasant, cooperative with care and we did use the in room  via video to assist with communication between nurse and patient. V/S have been WNL, assessment is normal although patient has some pallor to her general appearance.   No

## (undated) DEVICE — TRAP FLD MINIVAC MEGADYNE 100ML

## (undated) DEVICE — ARMADA 35 PTA CATHETER 4 MM X 120 MM X 135 CM / OVER-THE-WIRE: Brand: ARMADA

## (undated) DEVICE — SUT SILK 3/0 TIES 18IN A184H

## (undated) DEVICE — RADIFOCUS GLIDEWIRE: Brand: GLIDEWIRE

## (undated) DEVICE — ANTIBACTERIAL UNDYED BRAIDED (POLYGLACTIN 910), SYNTHETIC ABSORBABLE SUTURE: Brand: COATED VICRYL

## (undated) DEVICE — INTRO CHECKFLOW RAABE .038 6FR 55CM

## (undated) DEVICE — SYRINGE KIT,PACKAGED,,150FT,MK 7(ANGIO-ARTERION, 150ML SYR KIT W/QFT,MC)(60729385): Brand: MEDRAD® MARK 7 ARTERION DISPOSABLE SYRINGE 150 ML WITH QUICK FILL TUBE

## (undated) DEVICE — PK ATS CUST W CARDIOTOMY RESEVOIR

## (undated) DEVICE — PAD,ABDOMINAL,8"X10",ST,LF: Brand: MEDLINE

## (undated) DEVICE — CVR PROB 96IN LF STRL

## (undated) DEVICE — SUT SILK 2/0 SH CR5 18IN C0125

## (undated) DEVICE — INTROFLXOR CHECKFLO .59 ANL1 5F45

## (undated) DEVICE — STARCLOSE SE VASCULAR CLOSURE SYSTEM: Brand: STARCLOSE SE

## (undated) DEVICE — PINNACLE INTRODUCER SHEATH: Brand: PINNACLE

## (undated) DEVICE — SYS PERFUS SEP PLATLT W TIPS CUST

## (undated) DEVICE — CATH ANGIO TRCN NB BCN .038 5F 65CM RIM

## (undated) DEVICE — ANGIOGRAPHIC CATHETER: Brand: IMAGER™ II

## (undated) DEVICE — PK ORTHO MAJ 40

## (undated) DEVICE — PENCL E/S ULTRAVAC TELESCP NOSE HOLSTR 10FT

## (undated) DEVICE — SOL NACL 0.9PCT 1000ML

## (undated) DEVICE — PK ANGIO 40

## (undated) DEVICE — CATH SOS OMNI 5FRX80CM SZ2

## (undated) DEVICE — BLOOD COLLECTION/INFUSION SET,FEMALE LUER, 12 INCH (30.5 CM) TUBING: Brand: MONOJECT

## (undated) DEVICE — STERILE ULTRASOUND GEL, SAFEWRAP: Brand: ECOVUE

## (undated) DEVICE — INFLATION DEVICE: Brand: ENCORE™ 26

## (undated) DEVICE — RADIFOCUS GLIDEWIRE ADVANTAGE GUIDEWIRE: Brand: GLIDEWIRE ADVANTAGE

## (undated) DEVICE — STPLR SKIN VISISTAT WD 35CT

## (undated) DEVICE — CATH IV INSYTE AUTOGARD 14G 1 1/2IN ORNG

## (undated) DEVICE — CATH GUIDE SOFTVU SELECT/V HT OMNI .038 5F 65CM

## (undated) DEVICE — PINNACLE R/O II INTRODUCER SHEATH WITH RADIOPAQUE MARKER: Brand: PINNACLE

## (undated) DEVICE — GOWN,PREVENTION PLUS,XLONG/XLARGE,STRL: Brand: MEDLINE

## (undated) DEVICE — SUT SILK 3/0 SH CR5 18IN C0135

## (undated) DEVICE — DRSNG SURESITE WNDW 4X4.5

## (undated) DEVICE — NAVICROSS SUPPORT CATHETER: Brand: NAVICROSS

## (undated) DEVICE — EXTENSION SET, MALE LUER LOCK ADAPTER WITH RETRACTABLE COLLAR

## (undated) DEVICE — SUT VIC 0 CT1 CR8 18IN J840D

## (undated) DEVICE — GOWN,SLEEVE,STERILE,W/CSR WRAP,1/P: Brand: MEDLINE

## (undated) DEVICE — DRSNG WND BORDR/ADHS NONADHR/GZ LF 4X14IN STRL

## (undated) DEVICE — RADIFOCUS GLIDECATH: Brand: GLIDECATH

## (undated) DEVICE — ST ACC MICROPUNCTURE STFF .018 ECHO/PLDM/TP 4F/10CM 21G/7CM

## (undated) DEVICE — SUT SILK 4/0 TIES 18IN A183H

## (undated) DEVICE — PENCL ES MEGADINE EZ/CLEAN BUTN W/HOLSTR 10FT

## (undated) DEVICE — PTA BALLOON DILATATION CATHETER: Brand: MUSTANG™

## (undated) DEVICE — SUT VIC 3/0 CTI 36IN J944H

## (undated) DEVICE — DRSNG WND GZ PAD BORDERED 4X8IN STRL

## (undated) DEVICE — BANDAGE,GAUZE,BULKEE II,4.5"X4.1YD,STRL: Brand: MEDLINE

## (undated) DEVICE — SUT SILK 2/0 TIES 18IN A185H

## (undated) DEVICE — SUT PROLN 5/0 C1 D/A 36IN 8720H

## (undated) DEVICE — SOL NS 500ML

## (undated) DEVICE — APPL CHLORAPREP HI/LITE 26ML ORNG

## (undated) DEVICE — T-DRAPE,EXTREMITY,STERILE: Brand: MEDLINE

## (undated) DEVICE — GLV SURG SENSICARE PI MIC PF SZ7.5 LF STRL

## (undated) DEVICE — TOTAL TRAY, 16FR 10ML SIL FOLEY, URN: Brand: MEDLINE

## (undated) DEVICE — DUAL CUT SAGITTAL BLADE

## (undated) DEVICE — DRP SLUSH WARMR MACH CIR 44X44IN

## (undated) DEVICE — BNDG ELAS ELITE V/CLOSE 4IN 5YD LF STRL

## (undated) DEVICE — GLV SURG SENSICARE MICRO PF LF 7.5 STRL

## (undated) DEVICE — DESTINATION RENAL GUIDING SHEATH: Brand: DESTINATION

## (undated) DEVICE — STPCK 3WY D201 DISCOFIX

## (undated) DEVICE — CATH GUIDE SOFTVU FLUSH HT STR .035 5F 90CM

## (undated) DEVICE — PK AAA 40

## (undated) DEVICE — BALN DIL ADVANCE PTA LP 4F 3X12 170CM

## (undated) DEVICE — SPNG GZ WOVN 4X4IN 12PLY 10/BX STRL

## (undated) DEVICE — SUT PROLN 6/0 C1 D/A 30IN 8706H